# Patient Record
Sex: FEMALE | Race: WHITE | NOT HISPANIC OR LATINO | Employment: OTHER | URBAN - METROPOLITAN AREA
[De-identification: names, ages, dates, MRNs, and addresses within clinical notes are randomized per-mention and may not be internally consistent; named-entity substitution may affect disease eponyms.]

---

## 2017-03-01 ENCOUNTER — ALLSCRIPTS OFFICE VISIT (OUTPATIENT)
Dept: OTHER | Facility: OTHER | Age: 62
End: 2017-03-01

## 2017-03-01 DIAGNOSIS — F32.9 MAJOR DEPRESSIVE DISORDER, SINGLE EPISODE: ICD-10-CM

## 2017-03-01 DIAGNOSIS — R31.9 HEMATURIA: ICD-10-CM

## 2017-03-01 DIAGNOSIS — F41.9 ANXIETY DISORDER: ICD-10-CM

## 2017-03-01 DIAGNOSIS — Z12.11 ENCOUNTER FOR SCREENING FOR MALIGNANT NEOPLASM OF COLON: ICD-10-CM

## 2017-03-01 DIAGNOSIS — Z13.820 ENCOUNTER FOR SCREENING FOR OSTEOPOROSIS: ICD-10-CM

## 2017-03-01 DIAGNOSIS — E78.00 PURE HYPERCHOLESTEROLEMIA: ICD-10-CM

## 2017-03-01 DIAGNOSIS — Z01.419 ENCOUNTER FOR GYNECOLOGICAL EXAMINATION WITHOUT ABNORMAL FINDING: ICD-10-CM

## 2017-03-01 DIAGNOSIS — J20.9 ACUTE BRONCHITIS: ICD-10-CM

## 2017-03-01 DIAGNOSIS — J30.9 ALLERGIC RHINITIS: ICD-10-CM

## 2017-03-01 DIAGNOSIS — Z00.00 ENCOUNTER FOR GENERAL ADULT MEDICAL EXAMINATION WITHOUT ABNORMAL FINDINGS: ICD-10-CM

## 2017-03-01 DIAGNOSIS — Z12.31 ENCOUNTER FOR SCREENING MAMMOGRAM FOR MALIGNANT NEOPLASM OF BREAST: ICD-10-CM

## 2017-09-21 ENCOUNTER — ALLSCRIPTS OFFICE VISIT (OUTPATIENT)
Dept: OTHER | Facility: OTHER | Age: 62
End: 2017-09-21

## 2017-11-25 ENCOUNTER — ALLSCRIPTS OFFICE VISIT (OUTPATIENT)
Dept: OTHER | Facility: OTHER | Age: 62
End: 2017-11-25

## 2017-11-26 NOTE — PROGRESS NOTES
Assessment    1  Serous otitis media (381 4) (H65 90)   2  Eczema (692 9) (L30 9)   3  Sinusitis (473 9) (J32 9)    Discussion/Summary    #1  Sinusitis-patient was placed on amoxicillin 500 mg 1 3 times a day number #30  She is to use Coricidin HBP/cold and flu  Otitis media, bilaterally  Eczema of the right ear canal-patient to use hydrocortisone cream 2 5% apply sparingly to area of the outer ear canal twice a day  to settle down her eczema and to get rid of her infection  if not better  Possible side effects of new medications were reviewed with the patient/guardian today  The treatment plan was reviewed with the patient/guardian  The patient/guardian understands and agrees with the treatment plan     Self Referrals: No      Chief Complaint  Dr Brinda Hannah saw her in Sept  for bronchitis  told to use flonase spray and allegra  stopped flonase because it dried out her nasal passages  today ears have a muffled sound and trouble hearing no fever      History of Present Illness  HPI: This is a 41-year-old female who comes in with a sensation of her ears feeling full  She also has some drainage coming from both ears the right worse than the left  She denies any postnasal drip, cough, nausea, vomiting or diarrhea  She has been using Flonase and Allegra however the Flonase has made her nose so dry that she stopped using that  Her blood pressure today is 118/70 and her temperature is 98 7Â°  Review of Systems   Constitutional: feeling poorly, but-- as noted in HPI,-- no fever,-- no chills-- and-- not feeling tired  ENT: hearing loss-- and-- Ears feel blocked, but-- as noted in HPI,-- no nosebleeds,-- no sore throat,-- no nasal discharge-- and-- no hoarseness  Cardiovascular: no complaints of slow or fast heart rate, no chest pain, no palpitations, no leg claudication or lower extremity edema  Respiratory: no complaints of shortness of breath, no wheezing, no dyspnea on exertion, no orthopnea or PND  Gastrointestinal: no complaints of abdominal pain, no constipation, no nausea or diarrhea, no vomiting, no bloody stools  ROS reviewed  Active Problems    1  Acute bronchitis (466 0) (J20 9)   2  Allergic rhinitis (477 9) (J30 9)   3  Depression, recurrent (296 30) (F33 9)   4  Encounter for screening mammogram for malignant neoplasm of breast (V76 12) (Z12 31)   5  Hematuria (599 70) (R31 9)   6  Hypercholesterolemia (272 0) (E78 00)   7  Low back pain (724 2) (M54 5)   8  Screening for colon cancer (V76 51) (Z12 11)   9  Screening for osteoporosis (V82 81) (Z13 820)   10  Tobacco abuse (305 1) (Z72 0)   11  Visit for routine gyn exam (V72 31) (Z01 419)    Past Medical History  1  History of Acute maxillary sinusitis (461 0) (J01 00)  Active Problems And Past Medical History Reviewed: The active problems and past medical history were reviewed and updated today  Family History  Father    1  Family history of Colon Cancer (V16 0)  Brother    2  Family history of Colon Cancer (V16 0)  Family History Reviewed: The family history was reviewed and updated today  Social History     · Denied: History of Alcohol Use (History)   · Current every day smoker (305 1) (F17 200)   · Denied: History of Drug Use   · Employed   · One child   ·   The social history was reviewed and updated today  The social history was reviewed and is unchanged  Surgical History  Surgical History Reviewed: The surgical history was reviewed and updated today  Current Meds   1  Advil CAPS Recorded   2  Aleve 220 MG Oral Tablet; Take one tablet twice daily with food; Therapy: 97GLC4523 to (Last Rx:50Iny9692) Ordered   3  Azelastine HCl - 0 1 % Nasal Solution; use 2 sprays each nostril twice a day; Therapy: 03ADC4922 to (Last Huber Grills)  Requested for: 21Sep2017 Ordered   4  Tylenol 325 MG Oral Tablet; TAKE 1 TO 2 TABLETS EVERY 4 HOURS AS NEEDED Recorded   5   Ventolin  (90 Base) MCG/ACT Inhalation Aerosol Solution; INHALE 1 TO 2 PUFFS EVERY 6 HOURS AS NEEDED; Therapy: 94CQW1201 to (Last Rx:01Mar2017)  Requested for: 52QNJ2011 Ordered    The medication list was reviewed and updated today  Allergies  1  No Known Drug Allergies    Vitals   Recorded: 65YXX7248 11:27AM   Temperature 98 7 F   Heart Rate 80   Respiration 16   Systolic 500   Diastolic 70   Height 5 ft 6 in   Weight 157 lb    BMI Calculated 25 34   BSA Calculated 1 81       Physical Exam   Constitutional  General appearance: No acute distress, well appearing and well nourished  Ears, Nose, Mouth, and Throat  External inspection of ears and nose: Abnormal  -- Excoriation from her eczema in the outer aspect of her ear  Otoscopic examination: Abnormal  -- Tympanic membranes dull bilaterally  Oropharynx: Normal with no erythema, edema, exudate or lesions  Pulmonary  Auscultation of lungs: Clear to auscultation  Cardiovascular  Auscultation of heart: Normal rate and rhythm, normal S1 and S2, without murmurs  Examination of extremities for edema and/or varicosities: Normal    Lymphatic  Palpation of lymph nodes in neck: Abnormal  -- Positive anterior cervical adenopathy nontender to palpation          Signatures   Electronically signed by : Neal Jo, [facility]; Nov 25 2017 11:47AM EST                       (Author)    Electronically signed by : Norberto Pepper DO; Nov 25 2017  4:09PM EST

## 2017-12-16 ENCOUNTER — ALLSCRIPTS OFFICE VISIT (OUTPATIENT)
Dept: OTHER | Facility: OTHER | Age: 62
End: 2017-12-16

## 2017-12-19 NOTE — PROGRESS NOTES
Assessment  1  Current every day smoker (305 1) (F17 200)   2  Otitis externa, acute (380 10) (H60 509)   3  Serous otitis media (381 4) (H65 90)    Plan  Serous otitis media    · Ciprofloxacin HCl - 500 MG Oral Tablet; Take 1 tablet twice daily   · Ofloxacin 0 3 % Ophthalmic Solution (Ocuflox); And still 5 drops in the affected eartwice daily   · PredniSONE 10 MG Oral Tablet; 6,6,5,5,4,4,3,3,2,2,1,1    Discussion/Summary    1  Severe otitis media with effusion and otitis externa-recommend treatment with Cipro 500 mg twice daily for 10 days  Patient also given Ocuflox this in drops to be used 5 drops in each ear twice daily  Because of severe eczema and eustachian tube dysfunction she was also given prednisone taper, Prednisone 10 mg, Take 6 tablets daily for 2 days, then 5 tablets daily for 2 days, then 4 tablets daily for 2 days, then 3 tablets daily for 2 days, then 2 tablets daily for 2 days, then 1 tablet daily for 2 days  Forty-two pills with no refills  Patient has follow-up in the next 7 days if this is not improving and further evaluation by Otolaryngology may be necessary  Chief Complaint  Fluid in ears x months  Itchy  Has been treated in the past,, but symptoms keep recurring  Notes decreased hearing  - [facility]      History of Present Illness  HPI: This is a 78-year-old female who presents to the office very frustrated  She has been dealing with ear discomfort, muffling sensation, and loss of hearing since September  She has been through courses of antibiotics without much benefit  She continues to have severe symptoms and states that she does not have insurance to get a lot of testing or further evaluation completed  Review of Systems   Constitutional: feeling poorly-- and-- feeling tired, but-- no fever-- and-- no chills  ENT: earache,-- hearing loss-- and-- nasal discharge, but-- as noted in HPI  Respiratory: no cough-- and-- no shortness of breath during exertion    Gastrointestinal: no abdominal pain  Neurological: no headache  Active Problems  1  Acute bronchitis (466 0) (J20 9)   2  Allergic rhinitis (477 9) (J30 9)   3  Depression, recurrent (296 30) (F33 9)   4  Eczema (692 9) (L30 9)   5  Encounter for screening mammogram for malignant neoplasm of breast (V76 12) (Z12 31)   6  Hematuria (599 70) (R31 9)   7  Hypercholesterolemia (272 0) (E78 00)   8  Low back pain (724 2) (M54 5)   9  Screening for colon cancer (V76 51) (Z12 11)   10  Screening for osteoporosis (V82 81) (Z13 820)   11  Serous otitis media (381 4) (H65 90)   12  Tobacco abuse (305 1) (Z72 0)   13  Visit for routine gyn exam (V72 31) (Z01 419)    Past Medical History  1  History of Acute maxillary sinusitis (461 0) (J01 00)   2  Sinusitis (473 9) (J32 9)  Active Problems And Past Medical History Reviewed: The active problems and past medical history were reviewed and updated today  Family History  Father    1  Family history of Colon Cancer (V16 0)  Brother    2  Family history of Colon Cancer (V16 0)    Social History   · Denied: History of Alcohol Use (History)   · Current every day smoker (305 1) (F17 200)   · Denied: History of Drug Use   · Employed   · One child   ·     Current Meds   1  Advil CAPS; Therapy: (Recorded:22Kip4544) to Recorded   2  Aleve 220 MG Oral Tablet; Take one tablet twice daily with food; Therapy: 26AJR0360 to (Last Rx:91Mhz3740) Ordered   3  Azelastine HCl - 0 1 % Nasal Solution; use 2 sprays each nostril twice a day; Therapy: 38XJR2222 to (Last Nancy Mary)  Requested for: 83Rgr1625 Ordered   4  Hydrocortisone 2 5 % External Cream; APPLY SPARINGLY TO AFFECTED AREA(S) TWICE DAILY; Therapy: 03WUD3570 to (Last Rx:39Vph3251)  Requested for: 47RXZ3230 Ordered   5  Tylenol 325 MG Oral Tablet; TAKE 1 TO 2 TABLETS EVERY 4 HOURS AS NEEDED; Therapy: (Recorded:53Ipe4706) to Recorded    The medication list was reviewed and updated today  Allergies  1   No Known Drug Allergies    Vitals Recorded: 57QSS1641 10:02AM   Temperature 99 F, Oral   Heart Rate 84, L Radial   Pulse Quality Regular, L Radial   Systolic 237, LUE, Sitting   Diastolic 70, LUE, Sitting   Height 5 ft 6 in   Weight 154 lb    BMI Calculated 24 86   BSA Calculated 1 79     Physical Exam   Constitutional  General appearance: No acute distress, well appearing and well nourished  Eyes  Conjunctiva and lids: No swelling, erythema or discharge  Pupils and irises: Equal, round and reactive to light  Ears, Nose, Mouth, and Throat  External inspection of ears and nose: Normal    Otoscopic examination: Abnormal  -- Bilateral tympanic membranes are dull, erythematous, with yellow green fluid effusion  Ear canals bilaterally are erythematous, edematous, with dry cracking skin  Nasal mucosa, septum, and turbinates: Normal without edema or erythema  Oropharynx: Normal with no erythema, edema, exudate or lesions  Pulmonary  Respiratory effort: No increased work of breathing or signs of respiratory distress  Auscultation of lungs: Clear to auscultation  Cardiovascular  Auscultation of heart: Normal rate and rhythm, normal S1 and S2, without murmurs  Examination of extremities for edema and/or varicosities: Normal    Abdomen  Abdomen: Non-tender, no masses  Liver and spleen: No hepatomegaly or splenomegaly  Lymphatic  Palpation of lymph nodes in neck: No lymphadenopathy  Musculoskeletal  Gait and station: Normal    Digits and nails: Normal without clubbing or cyanosis  Neurologic  Reflexes: 2+ and symmetric  Sensation: No sensory loss  Psychiatric  Orientation to person, place, and time: Normal    Mood and affect: Normal          Signatures   Electronically signed by : Brandon Dawson [facility]; Dec 16 2017 10:28AM EST                       (Author)    Electronically signed by :  UZAIR Lorenzana ; Dec 18 2017  5:24PM EST

## 2018-01-14 VITALS
TEMPERATURE: 99.4 F | WEIGHT: 158 LBS | SYSTOLIC BLOOD PRESSURE: 120 MMHG | DIASTOLIC BLOOD PRESSURE: 64 MMHG | BODY MASS INDEX: 25.39 KG/M2 | HEART RATE: 80 BPM | HEIGHT: 66 IN

## 2018-01-14 VITALS
HEIGHT: 66 IN | WEIGHT: 157 LBS | DIASTOLIC BLOOD PRESSURE: 70 MMHG | SYSTOLIC BLOOD PRESSURE: 118 MMHG | RESPIRATION RATE: 16 BRPM | HEART RATE: 80 BPM | TEMPERATURE: 98.7 F | BODY MASS INDEX: 25.23 KG/M2

## 2018-01-14 VITALS
HEART RATE: 72 BPM | SYSTOLIC BLOOD PRESSURE: 110 MMHG | TEMPERATURE: 99.1 F | DIASTOLIC BLOOD PRESSURE: 62 MMHG | WEIGHT: 160 LBS | BODY MASS INDEX: 25.82 KG/M2

## 2018-01-14 NOTE — MISCELLANEOUS
Message  Return to work or school:   Kelby Meckel is under my professional care  She was seen in my office on 9/21/17       Please excuse from work on 9/21/17 and 9/22/17  Salo 100, DO  Signatures   Electronically signed by :  Deepa Ledesma, ; Sep 21 2017 12:27PM EST                       (Author)

## 2018-01-22 VITALS
DIASTOLIC BLOOD PRESSURE: 70 MMHG | TEMPERATURE: 99 F | WEIGHT: 154 LBS | BODY MASS INDEX: 24.75 KG/M2 | HEIGHT: 66 IN | HEART RATE: 84 BPM | SYSTOLIC BLOOD PRESSURE: 112 MMHG

## 2018-01-27 ENCOUNTER — OFFICE VISIT (OUTPATIENT)
Dept: FAMILY MEDICINE CLINIC | Facility: CLINIC | Age: 63
End: 2018-01-27

## 2018-01-27 VITALS
TEMPERATURE: 98.6 F | DIASTOLIC BLOOD PRESSURE: 72 MMHG | HEIGHT: 67 IN | BODY MASS INDEX: 23.67 KG/M2 | SYSTOLIC BLOOD PRESSURE: 102 MMHG | WEIGHT: 150.8 LBS

## 2018-01-27 DIAGNOSIS — J30.9 CHRONIC ALLERGIC RHINITIS, UNSPECIFIED SEASONALITY, UNSPECIFIED TRIGGER: ICD-10-CM

## 2018-01-27 DIAGNOSIS — Z72.0 TOBACCO ABUSE: ICD-10-CM

## 2018-01-27 DIAGNOSIS — H60.543 ECZEMA OF EXTERNAL EAR, BILATERAL: ICD-10-CM

## 2018-01-27 DIAGNOSIS — H60.63 CHRONIC OTITIS EXTERNA OF BOTH EARS, UNSPECIFIED TYPE: Primary | ICD-10-CM

## 2018-01-27 DIAGNOSIS — H66.005 RECURRENT ACUTE SUPPURATIVE OTITIS MEDIA WITHOUT SPONTANEOUS RUPTURE OF LEFT TYMPANIC MEMBRANE: ICD-10-CM

## 2018-01-27 PROBLEM — F33.9 DEPRESSION, RECURRENT (HCC): Status: ACTIVE | Noted: 2017-09-21

## 2018-01-27 PROCEDURE — 99213 OFFICE O/P EST LOW 20 MIN: CPT | Performed by: FAMILY MEDICINE

## 2018-01-27 RX ORDER — AMOXICILLIN AND CLAVULANATE POTASSIUM 875; 125 MG/1; MG/1
1 TABLET, FILM COATED ORAL EVERY 12 HOURS SCHEDULED
Qty: 20 TABLET | Refills: 0 | Status: SHIPPED | OUTPATIENT
Start: 2018-01-27 | End: 2018-02-06

## 2018-01-27 RX ORDER — MOMETASONE FUROATE 50 UG/1
2 SPRAY, METERED NASAL DAILY
Qty: 17 G | Refills: 0 | Status: SHIPPED | OUTPATIENT
Start: 2018-01-27 | End: 2018-11-02 | Stop reason: ALTCHOICE

## 2018-01-27 RX ORDER — ALBUTEROL SULFATE 90 UG/1
AEROSOL, METERED RESPIRATORY (INHALATION)
COMMUNITY
Start: 2017-03-01 | End: 2018-11-02 | Stop reason: ALTCHOICE

## 2018-01-27 NOTE — PROGRESS NOTES
Assessment/Plan:    Chronic otitis externa of both ears  This is chronic, patient to use Cortisporin ear drops, patient to follow up with ENT if not better       Diagnoses and all orders for this visit:    Chronic otitis externa of both ears, unspecified type  -     neomycin-polymyxin-hydrocortisone (CORTISPORIN) otic solution; Administer 4 drops into both ears every 6 (six) hours  -     Ambulatory Referral to Otolaryngology    Eczema of external ear, bilateral  -     neomycin-polymyxin-hydrocortisone (CORTISPORIN) otic solution; Administer 4 drops into both ears every 6 (six) hours  -     Ambulatory Referral to Otolaryngology    Recurrent acute suppurative otitis media without spontaneous rupture of left tympanic membrane  Comments:  Patient need ENT evaluation, to use Augmentin for now to treat the acute infection  Orders:  -     amoxicillin-clavulanate (AUGMENTIN) 875-125 mg per tablet; Take 1 tablet by mouth every 12 (twelve) hours for 10 days  -     Ambulatory Referral to Otolaryngology    Chronic allergic rhinitis, unspecified seasonality, unspecified trigger  Comments:  Patient to try Nasonex as per her request   Orders:  -     mometasone (NASONEX) 50 mcg/act nasal spray; 2 sprays into each nostril daily    Tobacco abuse  Comments:  Not ready to quit, encouraged to think about it  Other orders  -     albuterol (VENTOLIN HFA) 90 mcg/act inhaler; Inhale          Subjective:      Patient ID: Vianey Griffin is a 65 y o  female  Pt c/o drainage in ears, pt having trouble hearing  BL ear pain and drainage, hearing is muffled , had a dry skin there, pt didn't tried any meds   Pt was seen 5 weeks ago Cipro and systemic steroid was given and ear drops where given but the pt could nto afford the ear drops and her sx improved and today the drainage started again few days ago , no fever ro chills     For her allergy using Flonase   Ear Drainage    There is pain in both ears   This is a recurrent problem  The current episode started yesterday  The problem occurs constantly  The problem has been gradually worsening  There has been no fever  The patient is experiencing no pain  Associated symptoms include ear discharge and hearing loss  Pertinent negatives include no abdominal pain, diarrhea, headaches, neck pain, rash, rhinorrhea, sore throat or vomiting  She has tried nothing for the symptoms  The treatment provided no relief  Her past medical history is significant for a chronic ear infection and hearing loss  The following portions of the patient's history were reviewed and updated as appropriate: allergies, current medications, past family history, past medical history, past social history, past surgical history and problem list     Review of Systems   Constitutional: Negative for activity change, appetite change, chills, fatigue and fever  HENT: Positive for ear discharge, ear pain and hearing loss  Negative for congestion, drooling, rhinorrhea, sinus pain, sneezing, sore throat, trouble swallowing and voice change  Respiratory: Negative for chest tightness, shortness of breath and wheezing  Cardiovascular: Negative for chest pain and leg swelling  Gastrointestinal: Negative for abdominal pain, diarrhea, nausea and vomiting  Genitourinary: Negative for dysuria  Musculoskeletal: Negative for neck pain  Skin: Negative for rash  Neurological: Negative for headaches  Objective:  Vitals:    01/27/18 0939   BP: 102/72   Temp: 98 6 °F (37 °C)   Weight: 68 4 kg (150 lb 12 8 oz)   Height: 5' 7" (1 702 m)        Physical Exam   Constitutional: She appears well-developed and well-nourished  HENT:   Right Ear: Ear canal normal  There is drainage, swelling and tenderness  No foreign bodies  No mastoid tenderness  Tympanic membrane is erythematous  Tympanic membrane is not injected, not scarred and not perforated  No middle ear effusion  No hemotympanum  Decreased hearing is noted  Left Ear: There is drainage, swelling and tenderness  No foreign bodies  No mastoid tenderness  Tympanic membrane is erythematous  Tympanic membrane is not injected, not scarred and not perforated  No middle ear effusion  No hemotympanum  Decreased hearing (There is erythema and swelling on the ear canal) is noted  Ears:    Eyes: Conjunctivae and EOM are normal    Neck: Normal range of motion  Neck supple  Cardiovascular: Normal rate, regular rhythm and normal heart sounds  Pulmonary/Chest: Effort normal and breath sounds normal    Musculoskeletal: Normal range of motion

## 2018-01-27 NOTE — PATIENT INSTRUCTIONS
Diagnoses and all orders for this visit:    Chronic otitis externa of both ears, unspecified type  -     neomycin-polymyxin-hydrocortisone (CORTISPORIN) otic solution; Administer 4 drops into both ears every 6 (six) hours  -     Ambulatory Referral to Otolaryngology    Eczema of external ear, bilateral  -     neomycin-polymyxin-hydrocortisone (CORTISPORIN) otic solution; Administer 4 drops into both ears every 6 (six) hours  -     Ambulatory Referral to Otolaryngology    Recurrent acute suppurative otitis media without spontaneous rupture of left tympanic membrane  Comments:  Patient need ENT evaluation, to use Augmentin for now to treat the acute infection  Orders:  -     amoxicillin-clavulanate (AUGMENTIN) 875-125 mg per tablet; Take 1 tablet by mouth every 12 (twelve) hours for 10 days  -     Ambulatory Referral to Otolaryngology    Chronic allergic rhinitis, unspecified seasonality, unspecified trigger  Comments:  Patient to try Nasonex as per her request   Orders:  -     mometasone (NASONEX) 50 mcg/act nasal spray; 2 sprays into each nostril daily    Tobacco abuse  Comments:  Not ready to quit, encouraged to think about it  Other orders  -     albuterol (VENTOLIN HFA) 90 mcg/act inhaler;  Inhale

## 2018-11-02 ENCOUNTER — OFFICE VISIT (OUTPATIENT)
Dept: FAMILY MEDICINE CLINIC | Facility: CLINIC | Age: 63
End: 2018-11-02

## 2018-11-02 VITALS
HEART RATE: 84 BPM | WEIGHT: 151 LBS | BODY MASS INDEX: 23.7 KG/M2 | TEMPERATURE: 99.4 F | HEIGHT: 67 IN | DIASTOLIC BLOOD PRESSURE: 72 MMHG | SYSTOLIC BLOOD PRESSURE: 110 MMHG

## 2018-11-02 DIAGNOSIS — J01.90 ACUTE NON-RECURRENT SINUSITIS, UNSPECIFIED LOCATION: Primary | ICD-10-CM

## 2018-11-02 PROBLEM — J32.9 SINUSITIS: Status: ACTIVE | Noted: 2017-11-25

## 2018-11-02 PROCEDURE — 99212 OFFICE O/P EST SF 10 MIN: CPT | Performed by: FAMILY MEDICINE

## 2018-11-02 RX ORDER — NAPROXEN SODIUM 220 MG
220 TABLET ORAL 2 TIMES DAILY WITH MEALS
COMMUNITY

## 2018-11-02 RX ORDER — AMOXICILLIN 500 MG/1
CAPSULE ORAL
Qty: 30 CAPSULE | Refills: 0 | Status: SHIPPED | OUTPATIENT
Start: 2018-11-02 | End: 2018-11-09

## 2018-11-02 RX ORDER — FEXOFENADINE HCL 180 MG/1
180 TABLET ORAL DAILY
COMMUNITY

## 2018-11-02 NOTE — PROGRESS NOTES
Assessment/Plan:    Sinusitis  Patient was placed on amoxicillin 500 mg 1 capsule 3 times a day for 7 days  She was also instructed to use Coricidin HBP/cold and flu 1 tablet twice a day and 2 at bedtime  She may continue to use her Aleve for her achiness  Diagnoses and all orders for this visit:    Acute non-recurrent sinusitis, unspecified location  -     amoxicillin (AMOXIL) 500 mg capsule; One tablet 3 times a day for 7 days  Other orders  -     fexofenadine (ALLEGRA) 180 MG tablet; Take 180 mg by mouth daily  -     naproxen sodium (ALEVE) 220 MG tablet; Take 220 mg by mouth 2 (two) times a day with meals          Subjective:   Congestion, sinus pressure that started over the weekend  Occasional nonproductive cough  -  [facility]     Patient ID: Kim Mcclain is a 59 y o  female  This is a 71-year-old female who comes in with symptoms of fatigue, chills and achiness, a slight cough which is productive and head congestion  She has an excessive amount of postnasal drip but no nausea, vomiting or diarrhea  She had some nausea yesterday but it has resolved today  She has used Aleve for her temperature which is 99 4 today  Symptoms began yesterday  The following portions of the patient's history were reviewed and updated as appropriate: allergies, current medications, past family history, past medical history, past social history, past surgical history and problem list     Review of Systems   Constitutional: Negative  Negative for fever  HENT: Positive for congestion, postnasal drip, rhinorrhea and sinus pressure  Negative for ear pain and sore throat  Eyes: Negative  Respiratory: Positive for cough  Negative for shortness of breath and wheezing  Cardiovascular: Negative  Negative for chest pain  Gastrointestinal: Negative  Negative for diarrhea and nausea  Endocrine: Negative  Genitourinary: Negative  Musculoskeletal: Negative  Skin: Negative  Allergic/Immunologic: Negative  Neurological: Negative  Hematological: Negative  Psychiatric/Behavioral: Negative  Objective:      /72 (BP Location: Left arm, Patient Position: Sitting, Cuff Size: Large)   Pulse 84   Temp 99 4 °F (37 4 °C) (Oral)   Ht 5' 7" (1 702 m)   Wt 68 5 kg (151 lb)   BMI 23 65 kg/m²          Physical Exam   Constitutional: She is oriented to person, place, and time  She appears well-developed and well-nourished  HENT:   Head: Normocephalic  Tympanic membranes dull bilaterally without injection or erythema  Positive postnasal drip, no erythema of posterior pharynx and no exudates   Eyes: Pupils are equal, round, and reactive to light  Conjunctivae and EOM are normal    Neck: Normal range of motion  Neck supple  Cardiovascular: Normal rate, regular rhythm and normal heart sounds  Pulmonary/Chest: Effort normal  She has wheezes  Abdominal: Soft  Bowel sounds are normal    Musculoskeletal: Normal range of motion  Lymphadenopathy:     She has no cervical adenopathy  Neurological: She is alert and oriented to person, place, and time  Skin: Skin is warm  Psychiatric: She has a normal mood and affect   Her behavior is normal  Judgment and thought content normal

## 2018-11-02 NOTE — ASSESSMENT & PLAN NOTE
Patient was placed on amoxicillin 500 mg 1 capsule 3 times a day for 7 days  She was also instructed to use Coricidin HBP/cold and flu 1 tablet twice a day and 2 at bedtime  She may continue to use her Aleve for her achiness

## 2019-04-25 ENCOUNTER — OFFICE VISIT (OUTPATIENT)
Dept: FAMILY MEDICINE CLINIC | Facility: CLINIC | Age: 64
End: 2019-04-25

## 2019-04-25 VITALS
WEIGHT: 147.2 LBS | HEART RATE: 88 BPM | BODY MASS INDEX: 23.1 KG/M2 | HEIGHT: 67 IN | DIASTOLIC BLOOD PRESSURE: 64 MMHG | SYSTOLIC BLOOD PRESSURE: 118 MMHG

## 2019-04-25 DIAGNOSIS — R25.0 HEAD MOVEMENTS ABNORMAL: ICD-10-CM

## 2019-04-25 DIAGNOSIS — I49.9 IRREGULAR HEART RATE: Primary | ICD-10-CM

## 2019-04-25 DIAGNOSIS — R25.1 TREMOR OF BOTH HANDS: ICD-10-CM

## 2019-04-25 DIAGNOSIS — G25.0 BENIGN HEAD TREMOR: ICD-10-CM

## 2019-04-25 DIAGNOSIS — I49.1 PREMATURE SUPRAVENTRICULAR BEATS: ICD-10-CM

## 2019-04-25 DIAGNOSIS — R94.31 LONG QT INTERVAL: ICD-10-CM

## 2019-04-25 DIAGNOSIS — Z13.220 LIPID SCREENING: ICD-10-CM

## 2019-04-25 PROCEDURE — 99213 OFFICE O/P EST LOW 20 MIN: CPT | Performed by: PHYSICIAN ASSISTANT

## 2019-04-25 PROCEDURE — 93000 ELECTROCARDIOGRAM COMPLETE: CPT | Performed by: PHYSICIAN ASSISTANT

## 2019-04-26 LAB
ALBUMIN SERPL-MCNC: 4.5 G/DL (ref 3.6–5.1)
ALBUMIN/GLOB SERPL: 1.7 (CALC) (ref 1–2.5)
ALP SERPL-CCNC: 64 U/L (ref 33–130)
ALT SERPL-CCNC: 15 U/L (ref 6–29)
AST SERPL-CCNC: 21 U/L (ref 10–35)
BILIRUB SERPL-MCNC: 0.6 MG/DL (ref 0.2–1.2)
BUN SERPL-MCNC: 12 MG/DL (ref 7–25)
BUN/CREAT SERPL: NORMAL (CALC) (ref 6–22)
CALCIUM SERPL-MCNC: 9.7 MG/DL (ref 8.6–10.4)
CHLORIDE SERPL-SCNC: 102 MMOL/L (ref 98–110)
CHOLEST SERPL-MCNC: 268 MG/DL
CHOLEST/HDLC SERPL: 4.1 (CALC)
CO2 SERPL-SCNC: 29 MMOL/L (ref 20–32)
CREAT SERPL-MCNC: 0.72 MG/DL (ref 0.5–0.99)
GLOBULIN SER CALC-MCNC: 2.6 G/DL (CALC) (ref 1.9–3.7)
GLUCOSE SERPL-MCNC: 90 MG/DL (ref 65–99)
HDLC SERPL-MCNC: 65 MG/DL
LDLC SERPL CALC-MCNC: 184 MG/DL (CALC)
NONHDLC SERPL-MCNC: 203 MG/DL (CALC)
POTASSIUM SERPL-SCNC: 4.4 MMOL/L (ref 3.5–5.3)
PROT SERPL-MCNC: 7.1 G/DL (ref 6.1–8.1)
SL AMB EGFR AFRICAN AMERICAN: 103 ML/MIN/1.73M2
SL AMB EGFR NON AFRICAN AMERICAN: 89 ML/MIN/1.73M2
SODIUM SERPL-SCNC: 135 MMOL/L (ref 135–146)
TRIGL SERPL-MCNC: 85 MG/DL

## 2019-04-29 ENCOUNTER — TELEPHONE (OUTPATIENT)
Dept: FAMILY MEDICINE CLINIC | Facility: CLINIC | Age: 64
End: 2019-04-29

## 2019-04-29 ENCOUNTER — CONSULT (OUTPATIENT)
Dept: NEUROLOGY | Facility: CLINIC | Age: 64
End: 2019-04-29

## 2019-04-29 VITALS
WEIGHT: 146 LBS | HEIGHT: 67 IN | HEART RATE: 88 BPM | DIASTOLIC BLOOD PRESSURE: 77 MMHG | SYSTOLIC BLOOD PRESSURE: 119 MMHG | BODY MASS INDEX: 22.91 KG/M2

## 2019-04-29 DIAGNOSIS — G25.2 DYSTONIC TREMOR: ICD-10-CM

## 2019-04-29 DIAGNOSIS — I49.1 PREMATURE SUPRAVENTRICULAR BEATS: Primary | ICD-10-CM

## 2019-04-29 DIAGNOSIS — G24.3 CERVICAL DYSTONIA: Primary | ICD-10-CM

## 2019-04-29 DIAGNOSIS — R94.31 LONG QT INTERVAL: ICD-10-CM

## 2019-04-29 PROCEDURE — 99244 OFF/OP CNSLTJ NEW/EST MOD 40: CPT | Performed by: PSYCHIATRY & NEUROLOGY

## 2019-04-29 RX ORDER — ASPIRIN 81 MG/1
81 TABLET, CHEWABLE ORAL DAILY
COMMUNITY
End: 2019-11-11 | Stop reason: ALTCHOICE

## 2019-04-30 ENCOUNTER — CONSULT (OUTPATIENT)
Dept: CARDIOLOGY CLINIC | Facility: CLINIC | Age: 64
End: 2019-04-30

## 2019-04-30 VITALS
DIASTOLIC BLOOD PRESSURE: 62 MMHG | SYSTOLIC BLOOD PRESSURE: 112 MMHG | BODY MASS INDEX: 22.51 KG/M2 | HEART RATE: 82 BPM | WEIGHT: 143.4 LBS | HEIGHT: 67 IN

## 2019-04-30 DIAGNOSIS — E78.00 PURE HYPERCHOLESTEROLEMIA: Primary | ICD-10-CM

## 2019-04-30 DIAGNOSIS — R94.31 ABNORMAL EKG: ICD-10-CM

## 2019-04-30 DIAGNOSIS — Z72.0 TOBACCO ABUSE: ICD-10-CM

## 2019-04-30 PROCEDURE — 99244 OFF/OP CNSLTJ NEW/EST MOD 40: CPT | Performed by: INTERNAL MEDICINE

## 2019-05-01 ENCOUNTER — HOSPITAL ENCOUNTER (OUTPATIENT)
Dept: NON INVASIVE DIAGNOSTICS | Facility: CLINIC | Age: 64
Discharge: HOME | End: 2019-05-01

## 2019-05-01 DIAGNOSIS — E78.00 PURE HYPERCHOLESTEROLEMIA: ICD-10-CM

## 2019-05-01 DIAGNOSIS — Z72.0 TOBACCO ABUSE: ICD-10-CM

## 2019-05-01 DIAGNOSIS — R94.31 ABNORMAL EKG: ICD-10-CM

## 2019-05-01 PROCEDURE — 93306 TTE W/DOPPLER COMPLETE: CPT

## 2019-05-01 PROCEDURE — 93306 TTE W/DOPPLER COMPLETE: CPT | Performed by: INTERNAL MEDICINE

## 2019-05-06 ENCOUNTER — TELEPHONE (OUTPATIENT)
Dept: CARDIOLOGY CLINIC | Facility: CLINIC | Age: 64
End: 2019-05-06

## 2019-05-09 ENCOUNTER — HOSPITAL ENCOUNTER (OUTPATIENT)
Dept: NON INVASIVE DIAGNOSTICS | Facility: HOSPITAL | Age: 64
Discharge: HOME | End: 2019-05-09
Attending: INTERNAL MEDICINE

## 2019-05-09 DIAGNOSIS — Z72.0 TOBACCO ABUSE: ICD-10-CM

## 2019-05-09 DIAGNOSIS — E78.00 PURE HYPERCHOLESTEROLEMIA: ICD-10-CM

## 2019-05-09 DIAGNOSIS — R94.31 ABNORMAL EKG: ICD-10-CM

## 2019-05-09 PROCEDURE — 93226 XTRNL ECG REC<48 HR SCAN A/R: CPT

## 2019-05-09 PROCEDURE — 93225 XTRNL ECG REC<48 HRS REC: CPT

## 2019-05-14 PROCEDURE — 93227 XTRNL ECG REC<48 HR R&I: CPT | Performed by: INTERNAL MEDICINE

## 2019-05-15 ENCOUNTER — TELEPHONE (OUTPATIENT)
Dept: CARDIOLOGY CLINIC | Facility: CLINIC | Age: 64
End: 2019-05-15

## 2019-05-28 ENCOUNTER — DOCUMENTATION (OUTPATIENT)
Dept: CARDIOLOGY CLINIC | Facility: CLINIC | Age: 64
End: 2019-05-28

## 2019-11-11 ENCOUNTER — OFFICE VISIT (OUTPATIENT)
Dept: FAMILY MEDICINE CLINIC | Facility: CLINIC | Age: 64
End: 2019-11-11

## 2019-11-11 VITALS
SYSTOLIC BLOOD PRESSURE: 122 MMHG | WEIGHT: 154.2 LBS | HEIGHT: 67 IN | DIASTOLIC BLOOD PRESSURE: 72 MMHG | BODY MASS INDEX: 24.2 KG/M2 | HEART RATE: 88 BPM

## 2019-11-11 DIAGNOSIS — J06.9 ACUTE UPPER RESPIRATORY INFECTION: Primary | ICD-10-CM

## 2019-11-11 DIAGNOSIS — E78.00 HYPERCHOLESTEROLEMIA: ICD-10-CM

## 2019-11-11 PROBLEM — H60.63 CHRONIC OTITIS EXTERNA OF BOTH EARS: Status: RESOLVED | Noted: 2018-01-27 | Resolved: 2019-11-11

## 2019-11-11 PROBLEM — J32.9 SINUSITIS: Status: RESOLVED | Noted: 2017-11-25 | Resolved: 2019-11-11

## 2019-11-11 PROCEDURE — 99213 OFFICE O/P EST LOW 20 MIN: CPT | Performed by: PHYSICIAN ASSISTANT

## 2019-11-11 RX ORDER — AMOXICILLIN 250 MG/1
250 CAPSULE ORAL EVERY 8 HOURS SCHEDULED
Qty: 30 CAPSULE | Refills: 0 | Status: SHIPPED | OUTPATIENT
Start: 2019-11-11 | End: 2019-11-21

## 2019-11-11 NOTE — ASSESSMENT & PLAN NOTE
In April LDL was 184  Cardio recommended meds and pt never came back in  Will reorder lipid and call with results  Pt has no insurance

## 2019-11-11 NOTE — LETTER
November 11, 2019     Patient: Ysabel Valencia   YOB: 1955   Date of Visit: 11/11/2019       To Whom it May Concern:    Donta Clemens is under my professional care  She was seen in my office on 11/11/2019  She may return to work on 11/12/19  If you have any questions or concerns, please don't hesitate to call           Sincerely,          Liban Coles PA-C        CC: No Recipients

## 2019-11-11 NOTE — PROGRESS NOTES
Assessment and Plan:    Problem List Items Addressed This Visit        Respiratory    Acute upper respiratory infection - Primary     Amoxil as directed  Increase fluids  Relevant Medications    amoxicillin (AMOXIL) 250 mg capsule       Other    Hypercholesterolemia     In April LDL was 184  Cardio recommended meds and pt never came back in  Will reorder lipid and call with results  Pt has no insurance  Relevant Orders    Lipid Panel with Direct LDL reflex                 Diagnoses and all orders for this visit:    Acute upper respiratory infection  -     amoxicillin (AMOXIL) 250 mg capsule; Take 1 capsule (250 mg total) by mouth every 8 (eight) hours for 10 days    Hypercholesterolemia  -     Lipid Panel with Direct LDL reflex              Subjective:      Patient ID: Cher Koch is a 62 y o  female  CC:    Chief Complaint   Patient presents with    Cough     Pt c/o cough, headaches, fatigue x 4 to 5 days  kw    Nasal Congestion    Fatigue       HPI:      Patient here today because for the last for 5 days she has had a lot head congestion sinus pressure postnasal drip runny nose headaches and productive cough colored mucus  She is a  and is exposed to lot of different germs infections with  through 12 grade  She could not work on Friday or today and needs a note  Still smoking  Also she wanted to talk about the fact that she had a cardiology workup for an abnormal EKG 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 and blood work showed an LDL of 184 and he recommended medication  Patient has no health or prescription insurance at this time    She has been using Coricidin HBP      The following portions of the patient's history were reviewed and updated as appropriate: allergies, current medications, past family history, past medical history, past social history, past surgical history and problem list       Review of Systems   Constitutional: Positive for fatigue  HENT: Positive for congestion, rhinorrhea and sinus pressure  Eyes: Negative  Respiratory: Positive for cough  Cardiovascular: Negative  Gastrointestinal: Negative  Endocrine: Negative  Genitourinary: Negative  Musculoskeletal: Negative  Skin: Negative  Allergic/Immunologic: Negative  Neurological: Positive for headaches  Hematological: Negative  Psychiatric/Behavioral: Negative  Data to review:       Objective:    Vitals:    11/11/19 1409   BP: 122/72   BP Location: Left arm   Patient Position: Sitting   Pulse: 88   Weight: 69 9 kg (154 lb 3 2 oz)   Height: 5' 7" (1 702 m)        Physical Exam   Constitutional: She is oriented to person, place, and time  She appears well-developed and well-nourished  No distress  HENT:   Head: Normocephalic and atraumatic  Right Ear: Hearing, tympanic membrane, external ear and ear canal normal    Left Ear: Hearing, tympanic membrane, external ear and ear canal normal    Nose: Mucosal edema and rhinorrhea present  Mouth/Throat: Uvula is midline  Posterior oropharyngeal edema and posterior oropharyngeal erythema present  Eyes: Conjunctivae are normal  Right eye exhibits no discharge  Left eye exhibits no discharge  Neck: Neck supple  Carotid bruit is not present  Cardiovascular: Normal rate, regular rhythm and normal heart sounds  Exam reveals no gallop and no friction rub  No murmur heard  Pulmonary/Chest: Effort normal and breath sounds normal  No respiratory distress  She has no wheezes  She has no rales  Lymphadenopathy:     She has cervical adenopathy  Right cervical: Superficial cervical adenopathy present  Left cervical: Superficial cervical adenopathy present  Neurological: She is alert and oriented to person, place, and time  Skin: Skin is warm and dry  She is not diaphoretic  Psychiatric: She has a normal mood and affect  Judgment normal    Nursing note and vitals reviewed  Tobacco Cessation Counseling: Tobacco cessation counseling was provided  The patient is sincerely urged to quit consumption of tobacco  She is not ready to quit tobacco  Patient refused medication

## 2019-11-11 NOTE — PATIENT INSTRUCTIONS
Problem List Items Addressed This Visit        Respiratory    Acute upper respiratory infection - Primary     Amoxil as directed  Increase fluids           Relevant Medications    amoxicillin (AMOXIL) 250 mg capsule

## 2023-03-05 ENCOUNTER — APPOINTMENT (EMERGENCY)
Dept: CT IMAGING | Facility: HOSPITAL | Age: 68
End: 2023-03-05

## 2023-03-05 ENCOUNTER — HOSPITAL ENCOUNTER (EMERGENCY)
Facility: HOSPITAL | Age: 68
Discharge: HOME | End: 2023-03-05
Attending: EMERGENCY MEDICINE

## 2023-03-05 VITALS
SYSTOLIC BLOOD PRESSURE: 133 MMHG | RESPIRATION RATE: 20 BRPM | BODY MASS INDEX: 24.41 KG/M2 | HEART RATE: 88 BPM | WEIGHT: 155.87 LBS | OXYGEN SATURATION: 96 % | TEMPERATURE: 98.7 F | DIASTOLIC BLOOD PRESSURE: 71 MMHG

## 2023-03-05 DIAGNOSIS — R90.89 ABNORMAL CT OF BRAIN: ICD-10-CM

## 2023-03-05 DIAGNOSIS — R51.9 HEADACHE: Primary | ICD-10-CM

## 2023-03-05 RX ORDER — BUTALBITAL, ACETAMINOPHEN, CAFFEINE AND CODEINE PHOSPHATE 50; 325; 40; 30 MG/1; MG/1; MG/1; MG/1
1 CAPSULE ORAL EVERY 6 HOURS PRN
Qty: 15 CAPSULE | Refills: 0 | Status: SHIPPED | OUTPATIENT
Start: 2023-03-05 | End: 2023-03-11

## 2023-03-05 RX ORDER — METOCLOPRAMIDE HYDROCHLORIDE 5 MG/ML
10 INJECTION INTRAMUSCULAR; INTRAVENOUS ONCE
Status: COMPLETED | OUTPATIENT
Start: 2023-03-05 | End: 2023-03-05

## 2023-03-05 RX ORDER — DIPHENHYDRAMINE HYDROCHLORIDE 50 MG/ML
25 INJECTION INTRAMUSCULAR; INTRAVENOUS ONCE
Status: COMPLETED | OUTPATIENT
Start: 2023-03-05 | End: 2023-03-05

## 2023-03-05 RX ADMIN — METOCLOPRAMIDE 10 MG: 5 INJECTION, SOLUTION INTRAMUSCULAR; INTRAVENOUS at 11:00

## 2023-03-05 RX ADMIN — DIPHENHYDRAMINE HYDROCHLORIDE 25 MG: 50 INJECTION, SOLUTION INTRAMUSCULAR; INTRAVENOUS at 10:55

## 2023-03-05 RX ADMIN — SODIUM CHLORIDE 1000 ML: 0.9 INJECTION, SOLUTION INTRAVENOUS at 10:54

## 2023-03-05 NOTE — Clinical Note
Celina Dale was seen and treated in our emergency department on 3/5/2023  Diagnosis:     Justine    She may return on this date: If you have any questions or concerns, please don't hesitate to call        Yola Ballesteros MD    ______________________________           _______________          _______________  Hospital Representative                              Date                                Time

## 2023-03-05 NOTE — ED PROVIDER NOTES
History  Chief Complaint   Patient presents with   • Migraine     Pt reports migraine since yesterday  Hx of migraine  16-year-old female with a history of migraine headaches presents with a right-sided migraine headache since yesterday  Constant  She states that her to his  was yesterday and they had to cremate her so she has had a lot of stress in her life lately  No fevers, no nausea/vomiting, no head trauma  No sinus congestion  Headache is similar to her previous migraines  She tried Excedrin Migraine medication without relief          Prior to Admission Medications   Prescriptions Last Dose Informant Patient Reported? Taking?   fexofenadine (ALLEGRA) 180 MG tablet   Yes No   Sig: Take 180 mg by mouth daily   naproxen sodium (ALEVE) 220 MG tablet   Yes No   Sig: Take 220 mg by mouth 2 (two) times a day with meals      Facility-Administered Medications: None       Past Medical History:   Diagnosis Date   • Migraine    • Premature supraventricular beats        History reviewed  No pertinent surgical history  Family History   Problem Relation Age of Onset   • Colon cancer Father    • Colon cancer Brother      I have reviewed and agree with the history as documented  E-Cigarette/Vaping     E-Cigarette/Vaping Substances     Social History     Tobacco Use   • Smoking status: Every Day     Packs/day: 1 00     Years: 40 00     Pack years: 40 00     Types: Cigarettes   • Smokeless tobacco: Never   Substance Use Topics   • Alcohol use: No     Comment: (HISTORY)   • Drug use: No       Review of Systems   Constitutional: Negative for appetite change, fatigue and fever  HENT: Negative for rhinorrhea and sore throat  Eyes: Negative for pain  Respiratory: Negative for cough, shortness of breath and wheezing  Cardiovascular: Negative for chest pain and leg swelling  Gastrointestinal: Negative for abdominal pain, diarrhea and vomiting  Genitourinary: Negative for dysuria and flank pain  Musculoskeletal: Negative for back pain and neck pain  Skin: Negative for rash  Neurological: Positive for headaches  Negative for syncope  Psychiatric/Behavioral:        Mood normal       Physical Exam  Physical Exam  Vitals and nursing note reviewed  Constitutional:       Appearance: She is well-developed  HENT:      Head: Normocephalic and atraumatic  Right Ear: External ear normal       Left Ear: External ear normal    Eyes:      General: No scleral icterus  Extraocular Movements: Extraocular movements intact  Cardiovascular:      Rate and Rhythm: Normal rate and regular rhythm  Pulmonary:      Effort: Pulmonary effort is normal  No respiratory distress  Breath sounds: Normal breath sounds  Abdominal:      Palpations: Abdomen is soft  Tenderness: There is no abdominal tenderness  Musculoskeletal:         General: No deformity or signs of injury  Normal range of motion  Cervical back: Normal range of motion and neck supple  Skin:     General: Skin is warm and dry  Coloration: Skin is not jaundiced or pale  Neurological:      General: No focal deficit present  Mental Status: She is alert and oriented to person, place, and time  Cranial Nerves: No cranial nerve deficit  Sensory: No sensory deficit  Motor: No weakness        Gait: Gait normal    Psychiatric:         Mood and Affect: Mood normal          Behavior: Behavior normal          Vital Signs  ED Triage Vitals [03/05/23 1020]   Temperature Pulse Respirations Blood Pressure SpO2   98 7 °F (37 1 °C) 93 20 161/84 99 %      Temp Source Heart Rate Source Patient Position - Orthostatic VS BP Location FiO2 (%)   Temporal Monitor -- Right arm --      Pain Score       9           Vitals:    03/05/23 1020 03/05/23 1230   BP: 161/84 133/71   Pulse: 93 88         Visual Acuity  Visual Acuity    Flowsheet Row Most Recent Value   L Pupil Size (mm) 3   R Pupil Size (mm) 3          ED Medications  Medications   sodium chloride 0 9 % bolus 1,000 mL (0 mL Intravenous Stopped 3/5/23 1216)   diphenhydrAMINE (BENADRYL) injection 25 mg (25 mg Intravenous Given 3/5/23 1055)   metoclopramide (REGLAN) injection 10 mg (10 mg Intravenous Given 3/5/23 1100)       Diagnostic Studies  Results Reviewed     None                 CT head without contrast   Final Result by Jone Del Angel MD (03/05 1211)      Ill-defined hypoattenuation in the left occipital lobe measuring 2 6 x 2 4 cm, new from August 2010, which may be related to a focal brain lesion or age-indeterminate infarct  Further evaluation with contrast-enhanced MRI is recommended  I personally discussed this study with EMERSON Blair on 3/5/2023 at 12:10 PM                      Workstation performed: [id number]                    Procedures  Procedures         ED Course                                             Medical Decision Making  I went over the CAT scan results with the patient and her family at length  Patient is refusing admission, MRI or further work-up at this time  She understands the risks of leaving AMA including death and stroke  There is no way to definitely tell what this lesion/possible infarct is on CT without the MRI or further work-up  Patient understands that and she will see her family doctor to order the MRI  She understands that if symptoms worsen or she can get in to her doctor that she will come back and be admitted for this further work-up  Abnormal CT of brain: acute illness or injury  Headache: acute illness or injury  Amount and/or Complexity of Data Reviewed  Radiology: ordered  Risk  Prescription drug management            Disposition  Final diagnoses:   Headache   Abnormal CT of brain     Time reflects when diagnosis was documented in both MDM as applicable and the Disposition within this note     Time User Action Codes Description Comment    3/5/2023 12:25 PM José Manuel Bravo Redman Add [R51 9] Headache     3/5/2023 12:26 PM Yvette Hazel Add [R90 89] Abnormal CT of brain       ED Disposition     ED Disposition   AMA    Condition   --    Date/Time   Sun Mar 5, 2023 12:25 PM    Comment   Date: 3/5/2023  Patient: Mino Shore  Admitted: 3/5/2023 10:18 AM  Attending Provider: Pancho Colvin or her authorized caregiver has made the decision for the patient to leave the emergency department agai nst the advice of her attending physician  She or her authorized caregiver has been informed and understands the inherent risks, including death, stroke, worsening of symptoms  She or her authorized caregiver has decided to accept the responsibility  for this decision  Yosef Nguyen and all necessary parties have been advised that she may return for further evaluation or treatment  Her condition at time of discharge was fair    Mino Shore had current vital signs as follows:  BP 1 61/84 (BP Location: Right arm)   Pulse 93   Temp 98 7 °F (37 1 °C) (Temporal)   Resp 20   Wt 70 7 kg (155 lb 13 8 oz)            Follow-up Information     Follow up With Specialties Details Why Contact Info Additional [address]   [address], [address] [facility] [phone]  [address], [address], [address] [place], [place], [address]   [address] [facility] [address] [id number] [phone] [place] [address], [address], [facility] [address]          Discharge Medication List as of 3/5/2023 12:30 PM      START taking these medications    Details   butalbital-acetaminophen-caffeine-codeine (FIORICET WITH CODEINE) -47-30 MG per capsule Take 1 capsule by mouth every 6 (six) hours as needed for headaches for up to 10 days, Starting Sun 3/5/2023, Until Wed 3/15/2023 at 2359, Normal         CONTINUE these medications which have NOT CHANGED    Details   fexofenadine (ALLEGRA) 180 MG tablet Take 180 mg by mouth daily, Historical Med      naproxen sodium (ALEVE) 220 MG tablet Take 220 mg by mouth 2 (two) times a day with meals, Historical Med             No discharge procedures on file      PDMP Review     None          ED Provider  Electronically Signed by           Milady Subramanian MD  03/05/23 7439

## 2023-03-05 NOTE — DISCHARGE INSTRUCTIONS
It is very important to see your family dr Anirudh Dumont neurologist to get a MRI of the brain to further evaluate the abnormality on CT    If symptoms worsen, return to ER, to be admitted and get a further workup

## 2023-03-06 ENCOUNTER — APPOINTMENT (INPATIENT)
Dept: CT IMAGING | Facility: HOSPITAL | Age: 68
End: 2023-03-06

## 2023-03-06 ENCOUNTER — APPOINTMENT (EMERGENCY)
Dept: RADIOLOGY | Facility: HOSPITAL | Age: 68
End: 2023-03-06

## 2023-03-06 ENCOUNTER — HOSPITAL ENCOUNTER (INPATIENT)
Facility: HOSPITAL | Age: 68
LOS: 5 days | Discharge: HOME | End: 2023-03-11
Attending: EMERGENCY MEDICINE | Admitting: INTERNAL MEDICINE

## 2023-03-06 DIAGNOSIS — R90.89 ABNORMAL CT OF BRAIN: ICD-10-CM

## 2023-03-06 DIAGNOSIS — R51.9 PERSISTENT HEADACHES: Primary | ICD-10-CM

## 2023-03-06 DIAGNOSIS — I48.91 NEW ONSET A-FIB (HCC): ICD-10-CM

## 2023-03-06 DIAGNOSIS — R93.429 ABNORMAL MAGNETIC RESONANCE IMAGING OF KIDNEY: ICD-10-CM

## 2023-03-06 DIAGNOSIS — I63.40 CEREBROVASCULAR ACCIDENT (CVA) DUE TO EMBOLISM OF CEREBRAL ARTERY (HCC): ICD-10-CM

## 2023-03-06 DIAGNOSIS — Z72.0 TOBACCO ABUSE: ICD-10-CM

## 2023-03-06 DIAGNOSIS — I48.91 NEW ONSET ATRIAL FIBRILLATION (HCC): ICD-10-CM

## 2023-03-06 DIAGNOSIS — I63.432 CEREBROVASCULAR ACCIDENT (CVA) DUE TO EMBOLISM OF LEFT POSTERIOR CEREBRAL ARTERY (HCC): ICD-10-CM

## 2023-03-06 DIAGNOSIS — I48.91 RAPID ATRIAL FIBRILLATION (HCC): ICD-10-CM

## 2023-03-06 PROBLEM — F41.9 ANXIETY: Status: ACTIVE | Noted: 2023-03-06

## 2023-03-06 PROBLEM — G43.109 MIGRAINE WITH AURA AND WITHOUT STATUS MIGRAINOSUS, NOT INTRACTABLE: Status: ACTIVE | Noted: 2023-03-06

## 2023-03-06 PROBLEM — R22.0 OCCIPITAL MASS: Status: ACTIVE | Noted: 2023-03-06

## 2023-03-06 LAB
2HR DELTA HS TROPONIN: -2 NG/L
4HR DELTA HS TROPONIN: -2 NG/L
ALBUMIN SERPL BCP-MCNC: 4.2 G/DL (ref 3.5–5)
ALP SERPL-CCNC: 72 U/L (ref 34–104)
ALT SERPL W P-5'-P-CCNC: 12 U/L (ref 7–52)
ANION GAP SERPL CALCULATED.3IONS-SCNC: 10 MMOL/L (ref 4–13)
APTT PPP: 25 SECONDS (ref 23–37)
AST SERPL W P-5'-P-CCNC: 21 U/L (ref 13–39)
ATRIAL RATE: 326 BPM
ATRIAL RATE: 357 BPM
ATRIAL RATE: 375 BPM
ATRIAL RATE: 394 BPM
BASOPHILS # BLD AUTO: 0.07 THOUSANDS/ÂΜL (ref 0–0.1)
BASOPHILS NFR BLD AUTO: 1 % (ref 0–1)
BILIRUB SERPL-MCNC: 0.51 MG/DL (ref 0.2–1)
BNP SERPL-MCNC: 203 PG/ML (ref 0–100)
BUN SERPL-MCNC: 12 MG/DL (ref 5–25)
CALCIUM SERPL-MCNC: 9.3 MG/DL (ref 8.4–10.2)
CARDIAC TROPONIN I PNL SERPL HS: 11 NG/L
CARDIAC TROPONIN I PNL SERPL HS: 9 NG/L
CARDIAC TROPONIN I PNL SERPL HS: 9 NG/L
CHLORIDE SERPL-SCNC: 105 MMOL/L (ref 96–108)
CHOLEST SERPL-MCNC: 227 MG/DL
CO2 SERPL-SCNC: 22 MMOL/L (ref 21–32)
CREAT SERPL-MCNC: 0.62 MG/DL (ref 0.6–1.3)
EOSINOPHIL # BLD AUTO: 0.12 THOUSAND/ÂΜL (ref 0–0.61)
EOSINOPHIL NFR BLD AUTO: 1 % (ref 0–6)
ERYTHROCYTE [DISTWIDTH] IN BLOOD BY AUTOMATED COUNT: 13.6 % (ref 11.6–15.1)
FLUAV RNA RESP QL NAA+PROBE: NEGATIVE
FLUBV RNA RESP QL NAA+PROBE: NEGATIVE
GFR SERPL CREATININE-BSD FRML MDRD: 93 ML/MIN/1.73SQ M
GLUCOSE SERPL-MCNC: 113 MG/DL (ref 65–140)
HCT VFR BLD AUTO: 40.6 % (ref 34.8–46.1)
HDLC SERPL-MCNC: 54 MG/DL
HGB BLD-MCNC: 13.5 G/DL (ref 11.5–15.4)
IMM GRANULOCYTES # BLD AUTO: 0.05 THOUSAND/UL (ref 0–0.2)
IMM GRANULOCYTES NFR BLD AUTO: 0 % (ref 0–2)
INR PPP: 0.99 (ref 0.84–1.19)
LDLC SERPL CALC-MCNC: 158 MG/DL (ref 0–100)
LYMPHOCYTES # BLD AUTO: 2.68 THOUSANDS/ÂΜL (ref 0.6–4.47)
LYMPHOCYTES NFR BLD AUTO: 24 % (ref 14–44)
MAGNESIUM SERPL-MCNC: 2 MG/DL (ref 1.9–2.7)
MCH RBC QN AUTO: 28.6 PG (ref 26.8–34.3)
MCHC RBC AUTO-ENTMCNC: 33.3 G/DL (ref 31.4–37.4)
MCV RBC AUTO: 86 FL (ref 82–98)
MONOCYTES # BLD AUTO: 0.8 THOUSAND/ÂΜL (ref 0.17–1.22)
MONOCYTES NFR BLD AUTO: 7 % (ref 4–12)
NEUTROPHILS # BLD AUTO: 7.54 THOUSANDS/ÂΜL (ref 1.85–7.62)
NEUTS SEG NFR BLD AUTO: 67 % (ref 43–75)
NRBC BLD AUTO-RTO: 0 /100 WBCS
PLATELET # BLD AUTO: 362 THOUSANDS/UL (ref 149–390)
PMV BLD AUTO: 10.1 FL (ref 8.9–12.7)
POTASSIUM SERPL-SCNC: 3.8 MMOL/L (ref 3.5–5.3)
PROT SERPL-MCNC: 7.4 G/DL (ref 6.4–8.4)
PROTHROMBIN TIME: 13.1 SECONDS (ref 11.6–14.5)
QRS AXIS: 24 DEGREES
QRS AXIS: 36 DEGREES
QRS AXIS: 36 DEGREES
QRS AXIS: 39 DEGREES
QRSD INTERVAL: 80 MS
QRSD INTERVAL: 82 MS
QRSD INTERVAL: 82 MS
QRSD INTERVAL: 88 MS
QT INTERVAL: 272 MS
QT INTERVAL: 300 MS
QT INTERVAL: 378 MS
QT INTERVAL: 378 MS
QTC INTERVAL: 393 MS
QTC INTERVAL: 452 MS
QTC INTERVAL: 454 MS
QTC INTERVAL: 479 MS
RBC # BLD AUTO: 4.72 MILLION/UL (ref 3.81–5.12)
RSV RNA RESP QL NAA+PROBE: NEGATIVE
SARS-COV-2 RNA RESP QL NAA+PROBE: NEGATIVE
SODIUM SERPL-SCNC: 137 MMOL/L (ref 135–147)
T WAVE AXIS: 19 DEGREES
T WAVE AXIS: 27 DEGREES
T WAVE AXIS: 48 DEGREES
T WAVE AXIS: 48 DEGREES
TRIGL SERPL-MCNC: 73 MG/DL
TSH SERPL DL<=0.05 MIU/L-ACNC: 2.36 UIU/ML (ref 0.45–4.5)
VENTRICULAR RATE: 126 BPM
VENTRICULAR RATE: 153 BPM
VENTRICULAR RATE: 86 BPM
VENTRICULAR RATE: 87 BPM
WBC # BLD AUTO: 11.26 THOUSAND/UL (ref 4.31–10.16)

## 2023-03-06 RX ORDER — DILTIAZEM HYDROCHLORIDE 5 MG/ML
10 INJECTION INTRAVENOUS ONCE
Status: COMPLETED | OUTPATIENT
Start: 2023-03-06 | End: 2023-03-06

## 2023-03-06 RX ORDER — KETOROLAC TROMETHAMINE 30 MG/ML
30 INJECTION, SOLUTION INTRAMUSCULAR; INTRAVENOUS ONCE
Status: DISCONTINUED | OUTPATIENT
Start: 2023-03-06 | End: 2023-03-06

## 2023-03-06 RX ORDER — KETOROLAC TROMETHAMINE 30 MG/ML
15 INJECTION, SOLUTION INTRAMUSCULAR; INTRAVENOUS ONCE
Status: COMPLETED | OUTPATIENT
Start: 2023-03-06 | End: 2023-03-06

## 2023-03-06 RX ORDER — HYDROXYZINE HYDROCHLORIDE 25 MG/1
25 TABLET, FILM COATED ORAL EVERY 6 HOURS PRN
Status: DISCONTINUED | OUTPATIENT
Start: 2023-03-06 | End: 2023-03-11 | Stop reason: HOSPADM

## 2023-03-06 RX ORDER — LANOLIN ALCOHOL/MO/W.PET/CERES
3 CREAM (GRAM) TOPICAL
Status: DISCONTINUED | OUTPATIENT
Start: 2023-03-06 | End: 2023-03-11 | Stop reason: HOSPADM

## 2023-03-06 RX ADMIN — DILTIAZEM HYDROCHLORIDE 10 MG/HR: 5 INJECTION INTRAVENOUS at 22:26

## 2023-03-06 RX ADMIN — DILTIAZEM HYDROCHLORIDE 10 MG: 5 INJECTION INTRAVENOUS at 18:40

## 2023-03-06 RX ADMIN — KETOROLAC TROMETHAMINE 15 MG: 30 INJECTION, SOLUTION INTRAMUSCULAR; INTRAVENOUS at 18:38

## 2023-03-06 RX ADMIN — IOHEXOL 85 ML: 350 INJECTION, SOLUTION INTRAVENOUS at 22:55

## 2023-03-06 RX ADMIN — DILTIAZEM HYDROCHLORIDE 5 MG/HR: 5 INJECTION INTRAVENOUS at 18:45

## 2023-03-06 RX ADMIN — SODIUM CHLORIDE 1000 ML: 0.9 INJECTION, SOLUTION INTRAVENOUS at 18:39

## 2023-03-06 NOTE — ED PROVIDER NOTES
History  Chief Complaint   Patient presents with   • Headache     Pt reports "I guess I have a really bad migraine"  Pt reports left sided head pain, slurred speech, disorientation sine 1500 today  Pt reports that she "sometimes" has these symptoms with her migraines  Pt was seen in ED yesterday and had abnormal CT of brain, was recommended admission and left AMA  79 y/o female with waxing and waning headaches since 2 days ago while at a    No head trauma, no n/v/, no fevers, no neck pain  At times pt 's speech seems affected over the past 2 days according to family, but pt  States that she gets this with her migraine headaches  She had a CT head yest  In ER which showed:  Ill-defined hypoattenuation in the left occipital lobe measuring 2 6 x 2 4 cm, new from 2010, which may be related to a focal brain lesion or age-indeterminate infarct  Further evaluation with contrast-enhanced MRI is recommended  She didn't want to be admitted at that time and signed out AMA, however today she agrees to stay and be admitted for a MRI  Pt  Is found to be in rapid afib  120's-150's upon arrival to ER, no h/o a fib  No palpitations, no lightheadedness, no cp  Prior to Admission Medications   Prescriptions Last Dose Informant Patient Reported? Taking? butalbital-acetaminophen-caffeine-codeine (FIORICET WITH CODEINE) -03-30 MG per capsule   No No   Sig: Take 1 capsule by mouth every 6 (six) hours as needed for headaches for up to 10 days   fexofenadine (ALLEGRA) 180 MG tablet   Yes No   Sig: Take 180 mg by mouth daily   naproxen sodium (ALEVE) 220 MG tablet   Yes No   Sig: Take 220 mg by mouth 2 (two) times a day with meals      Facility-Administered Medications: None       Past Medical History:   Diagnosis Date   • Migraine    • Premature supraventricular beats        No past surgical history on file      Family History   Problem Relation Age of Onset   • Colon cancer Father    • Colon cancer Brother      I have reviewed and agree with the history as documented  E-Cigarette/Vaping     E-Cigarette/Vaping Substances     Social History     Tobacco Use   • Smoking status: Every Day     Packs/day: 1 00     Years: 40 00     Pack years: 40 00     Types: Cigarettes   • Smokeless tobacco: Never   Substance Use Topics   • Alcohol use: No     Comment: (HISTORY)   • Drug use: No       Review of Systems   Constitutional: Negative for appetite change, fatigue and fever  HENT: Negative for rhinorrhea and sore throat  Eyes: Negative for pain  Respiratory: Negative for cough, shortness of breath and wheezing  Cardiovascular: Negative for chest pain and leg swelling  Gastrointestinal: Negative for abdominal pain, diarrhea and vomiting  Genitourinary: Negative for dysuria and flank pain  Musculoskeletal: Negative for back pain and neck pain  Skin: Negative for rash  Neurological: Positive for headaches  Negative for syncope  Psychiatric/Behavioral:        Mood normal       Physical Exam  Physical Exam  Vitals and nursing note reviewed  Constitutional:       Appearance: She is well-developed  HENT:      Head: Normocephalic and atraumatic  Right Ear: External ear normal       Left Ear: External ear normal       Mouth/Throat:      Mouth: Mucous membranes are moist    Eyes:      General: No scleral icterus  Extraocular Movements: Extraocular movements intact  Pupils: Pupils are equal, round, and reactive to light  Cardiovascular:      Rate and Rhythm: Tachycardia present  Rhythm irregular  Pulmonary:      Effort: Pulmonary effort is normal  No respiratory distress  Breath sounds: Normal breath sounds  Abdominal:      Palpations: Abdomen is soft  Tenderness: There is no abdominal tenderness  Musculoskeletal:         General: No deformity or signs of injury  Normal range of motion  Cervical back: Normal range of motion and neck supple     Skin: General: Skin is warm and dry  Coloration: Skin is not jaundiced or pale  Neurological:      General: No focal deficit present  Mental Status: She is alert and oriented to person, place, and time  Cranial Nerves: No cranial nerve deficit  Sensory: No sensory deficit  Motor: No weakness  Psychiatric:         Mood and Affect: Mood normal          Behavior: Behavior normal          Vital Signs  ED Triage Vitals   Temperature Pulse Respirations Blood Pressure SpO2   03/06/23 1824 03/06/23 1735 03/06/23 1735 03/06/23 1735 03/06/23 1735   97 7 °F (36 5 °C) 102 18 144/94 96 %      Temp Source Heart Rate Source Patient Position - Orthostatic VS BP Location FiO2 (%)   03/06/23 1824 03/06/23 1735 03/06/23 1830 03/06/23 1735 --   Oral Monitor Lying Right arm       Pain Score       03/06/23 1809       5           Vitals:    03/06/23 1900 03/06/23 1915 03/06/23 2034 03/06/23 2044   BP: 123/72 133/73  129/82   Pulse: 104 88 90 91   Patient Position - Orthostatic VS: Lying Lying  Sitting         Visual Acuity      ED Medications  Medications   diltiazem (CARDIZEM) 125 mg in sodium chloride 0 9 % 125 mL infusion (has no administration in time range)   sodium chloride 0 9 % bolus 1,000 mL (0 mL Intravenous Stopped 3/6/23 2018)   diltiazem (CARDIZEM) injection 10 mg (10 mg Intravenous Given 3/6/23 1840)   diltiazem (CARDIZEM) 125 mg in sodium chloride 0 9 % 125 mL infusion (10 mg/hr Intravenous Rate/Dose Change 3/6/23 1959)   ketorolac (TORADOL) injection 15 mg (15 mg Intravenous Given 3/6/23 1838)       Diagnostic Studies  Results Reviewed     Procedure Component Value Units Date/Time    HS Troponin I 2hr [[id number]] Collected: 03/06/23 2043    Lab Status:  In process Specimen: Blood from Arm, Right Updated: 03/06/23 2057    Magnesium [[id number]]  (Normal) Collected: 03/06/23 1830    Lab Status: Final result Specimen: Blood from Arm, Left Updated: 03/06/23 2051     Magnesium 2 0 mg/dL     Hemoglobin A1C [[id number]] Collected: 03/06/23 1830    Lab Status: In process Specimen: Blood from Arm, Left Updated: 03/06/23 2040    Lipid Panel with Direct LDL reflex [[id number]]     Lab Status: No result Specimen: Blood     HS Troponin I 4hr [[id number]]     Lab Status: No result Specimen: Blood     TSH [[id number]]  (Normal) Collected: 03/06/23 1830    Lab Status: Final result Specimen: Blood from Arm, Left Updated: 03/06/23 1934     TSH 3RD GENERATON 2 362 uIU/mL     Narrative:      Patients undergoing fluorescein dye angiography may retain small amounts of fluorescein in the body for 48-72 hours post procedure  Samples containing fluorescein can produce falsely depressed TSH values  If the patient had this procedure,a specimen should be resubmitted post fluorescein clearance        B-Type Natriuretic Peptide(BNP) [[id number]]  (Abnormal) Collected: 03/06/23 1830    Lab Status: Final result Specimen: Blood from Arm, Left Updated: 03/06/23 1924      pg/mL     HS Troponin 0hr (reflex protocol) [[id number]]  (Normal) Collected: 03/06/23 1830    Lab Status: Final result Specimen: Blood from Arm, Left Updated: 03/06/23 1923     hs TnI 0hr 11 ng/L     Comprehensive metabolic panel [[id number]] Collected: 03/06/23 1830    Lab Status: Final result Specimen: Blood from Arm, Left Updated: 03/06/23 1917     Sodium 137 mmol/L      Potassium 3 8 mmol/L      Chloride 105 mmol/L      CO2 22 mmol/L      ANION GAP 10 mmol/L      BUN 12 mg/dL      Creatinine 0 62 mg/dL      Glucose 113 mg/dL      Calcium 9 3 mg/dL      AST 21 U/L      ALT 12 U/L      Alkaline Phosphatase 72 U/L      Total Protein 7 4 g/dL      Albumin 4 2 g/dL      Total Bilirubin 0 51 mg/dL      eGFR 93 ml/min/1 73sq m     Narrative:      [facility] guidelines for Chronic Kidney Disease (CKD):   •  Stage 1 with normal or high GFR (GFR > 90 mL/min/1 73 square meters)  •  Stage 2 Mild CKD (GFR = 60-89 mL/min/1 73 square meters)  •  Stage 3A Moderate CKD (GFR = 45-59 mL/min/1 73 square meters)  •  Stage 3B Moderate CKD (GFR = 30-44 mL/min/1 73 square meters)  •  Stage 4 Severe CKD (GFR = 15-29 mL/min/1 73 square meters)  •  Stage 5 End Stage CKD (GFR <15 mL/min/1 73 square meters)  Note: GFR calculation is accurate only with a steady state creatinine    Protime-INR [[id number]]  (Normal) Collected: 03/06/23 1830    Lab Status: Final result Specimen: Blood from Arm, Left Updated: 03/06/23 1913     Protime 13 1 seconds      INR 0 99    APTT [[id number]]  (Normal) Collected: 03/06/23 1830    Lab Status: Final result Specimen: Blood from Arm, Left Updated: 03/06/23 1913     PTT 25 seconds     CBC and differential [[id number]]  (Abnormal) Collected: 03/06/23 1830    Lab Status: Final result Specimen: Blood from Arm, Left Updated: 03/06/23 1857     WBC 11 26 Thousand/uL      RBC 4 72 Million/uL      Hemoglobin 13 5 g/dL      Hematocrit 40 6 %      MCV 86 fL      MCH 28 6 pg      MCHC 33 3 g/dL      RDW 13 6 %      MPV 10 1 fL      Platelets 420 Thousands/uL      nRBC 0 /100 WBCs      Neutrophils Relative 67 %      Immat GRANS % 0 %      Lymphocytes Relative 24 %      Monocytes Relative 7 %      Eosinophils Relative 1 %      Basophils Relative 1 %      Neutrophils Absolute 7 54 Thousands/µL      Immature Grans Absolute 0 05 Thousand/uL      Lymphocytes Absolute 2 68 Thousands/µL      Monocytes Absolute 0 80 Thousand/µL      Eosinophils Absolute 0 12 Thousand/µL      Basophils Absolute 0 07 Thousands/µL                  XR chest 1 view portable    (Results Pending)              Procedures  Procedures         ED Course                               SBIRT 22yo+    Flowsheet Row Most Recent Value   SBIRT (25 yo +)    In order to provide better care to our patients, we are screening all of our patients for alcohol and drug use  Would it be okay to ask you these screening questions?  No Filed at: 03/06/2023 1830                    Medical Decision Making  ekg shows rapid a fib - improved with cardizem bolus and drip    I discussed the case with Dr Lillian Dobbins, neurologist on call, who agrees with plan to do a MRI in am, and admit to medicine    Abnormal CT of brain: acute illness or injury  New onset atrial fibrillation [facility]): acute illness or injury  Persistent headaches: acute illness or injury  Rapid atrial fibrillation [facility]): acute illness or injury  Amount and/or Complexity of Data Reviewed  Labs: ordered  Radiology: ordered  Risk  Prescription drug management  Decision regarding hospitalization  Disposition  Final diagnoses:   Persistent headaches   Rapid atrial fibrillation (HCC)   Abnormal CT of brain   New onset atrial fibrillation ([facility] [address] )     Time reflects when diagnosis was documented in both MDM as applicable and the Disposition within this note     Time User Action Codes Description Comment    3/6/2023  6:27 PM Cristian Lombardo R Add [R51 9] Persistent headaches     3/6/2023  7:42 PM Ron Georges R Add [I48 91] Rapid atrial fibrillation ([facility] [address] )     3/6/2023  7:42 PM Ron Georges R Add [R90 89] Abnormal CT of brain     3/6/2023  7:43 PM Ron Georges R Add [I48 91] New onset atrial fibrillation [facility])       ED Disposition     ED Disposition   Admit    Condition   Stable    Date/Time   Mon Mar 6, 2023  7:43 PM    Comment   Case was discussed with MARTÍN  and the patient's admission status was agreed to be inpt /tele         Follow-up Information    None         Patient's Medications   Discharge Prescriptions    No medications on file       No discharge procedures on file      PDMP Review       Value Time User    PDMP Reviewed  Yes 3/6/2023  8:27 PM Michael Padron PA-C          ED Provider  Electronically Signed by           Porsha Banda MD  03/06/23 0650

## 2023-03-07 ENCOUNTER — APPOINTMENT (INPATIENT)
Dept: NON INVASIVE DIAGNOSTICS | Facility: HOSPITAL | Age: 68
End: 2023-03-07

## 2023-03-07 ENCOUNTER — APPOINTMENT (INPATIENT)
Dept: MRI IMAGING | Facility: HOSPITAL | Age: 68
End: 2023-03-07

## 2023-03-07 PROBLEM — I63.9 OCCIPITAL STROKE (HCC): Status: ACTIVE | Noted: 2023-03-06

## 2023-03-07 PROBLEM — I63.432 CEREBROVASCULAR ACCIDENT (CVA) DUE TO EMBOLISM OF LEFT POSTERIOR CEREBRAL ARTERY (HCC): Status: ACTIVE | Noted: 2023-03-07

## 2023-03-07 PROBLEM — I63.40 CEREBROVASCULAR ACCIDENT (CVA) DUE TO EMBOLISM OF CEREBRAL ARTERY (HCC): Status: ACTIVE | Noted: 2023-03-06

## 2023-03-07 LAB
ANION GAP SERPL CALCULATED.3IONS-SCNC: 8 MMOL/L (ref 4–13)
AORTIC ROOT: 2.7 CM
AORTIC VALVE MEAN VELOCITY: 8.3 M/S
APICAL FOUR CHAMBER EJECTION FRACTION: 56 %
ASCENDING AORTA: 2.7 CM
AV AREA BY CONTINUOUS VTI: 2.2 CM2
AV AREA PEAK VELOCITY: 2.4 CM2
AV LVOT MEAN GRADIENT: 1 MMHG
AV LVOT PEAK GRADIENT: 2 MMHG
AV MEAN GRADIENT: 3 MMHG
AV PEAK GRADIENT: 6 MMHG
AV VALVE AREA: 2.25 CM2
AV VELOCITY RATIO: 0.63
BUN SERPL-MCNC: 10 MG/DL (ref 5–25)
CALCIUM SERPL-MCNC: 8.7 MG/DL (ref 8.4–10.2)
CHLORIDE SERPL-SCNC: 107 MMOL/L (ref 96–108)
CO2 SERPL-SCNC: 23 MMOL/L (ref 21–32)
CREAT SERPL-MCNC: 0.54 MG/DL (ref 0.6–1.3)
DOP CALC AO PEAK VEL: 1.21 M/S
DOP CALC AO VTI: 20 CM
DOP CALC LVOT AREA: 3.8 CM2
DOP CALC LVOT DIAMETER: 2.2 CM
DOP CALC LVOT PEAK VEL VTI: 11.83 CM
DOP CALC LVOT PEAK VEL: 0.76 M/S
DOP CALC LVOT STROKE INDEX: 24.5 ML/M2
DOP CALC LVOT STROKE VOLUME: 44.95 CM3
ERYTHROCYTE [DISTWIDTH] IN BLOOD BY AUTOMATED COUNT: 13.6 % (ref 11.6–15.1)
EST. AVERAGE GLUCOSE BLD GHB EST-MCNC: 114 MG/DL
FRACTIONAL SHORTENING: 37 % (ref 28–44)
GFR SERPL CREATININE-BSD FRML MDRD: 97 ML/MIN/1.73SQ M
GLUCOSE SERPL-MCNC: 108 MG/DL (ref 65–140)
HBA1C MFR BLD: 5.6 %
HCT VFR BLD AUTO: 38.4 % (ref 34.8–46.1)
HGB BLD-MCNC: 12.8 G/DL (ref 11.5–15.4)
INTERVENTRICULAR SEPTUM IN DIASTOLE (PARASTERNAL SHORT AXIS VIEW): 1.1 CM
INTERVENTRICULAR SEPTUM: 1.1 CM (ref 0.6–1.1)
LAAS-AP2: 23.3 CM2
LAAS-AP4: 24.2 CM2
LEFT ATRIUM SIZE: 3.6 CM
LEFT INTERNAL DIMENSION IN SYSTOLE: 2.4 CM (ref 2.1–4)
LEFT VENTRICULAR INTERNAL DIMENSION IN DIASTOLE: 3.8 CM (ref 3.5–6)
LEFT VENTRICULAR POSTERIOR WALL IN END DIASTOLE: 1.1 CM
LEFT VENTRICULAR STROKE VOLUME: 42 ML
LVSV (TEICH): 42 ML
MCH RBC QN AUTO: 29.3 PG (ref 26.8–34.3)
MCHC RBC AUTO-ENTMCNC: 33.3 G/DL (ref 31.4–37.4)
MCV RBC AUTO: 88 FL (ref 82–98)
PA SYSTOLIC PRESSURE: 34 MMHG
PLATELET # BLD AUTO: 324 THOUSANDS/UL (ref 149–390)
PMV BLD AUTO: 9.7 FL (ref 8.9–12.7)
POTASSIUM SERPL-SCNC: 3.8 MMOL/L (ref 3.5–5.3)
RA PRESSURE ESTIMATED: 5 MMHG
RBC # BLD AUTO: 4.37 MILLION/UL (ref 3.81–5.12)
RIGHT ATRIUM AREA SYSTOLE A4C: 19.8 CM2
RIGHT VENTRICLE ID DIMENSION: 4.6 CM
RV PSP: 34 MMHG
SL CV LEFT ATRIUM LENGTH A2C: 5.9 CM
SL CV LV EF: 54
SL CV PED ECHO LEFT VENTRICLE DIASTOLIC VOLUME (MOD BIPLANE) 2D: 63 ML
SL CV PED ECHO LEFT VENTRICLE SYSTOLIC VOLUME (MOD BIPLANE) 2D: 21 ML
SODIUM SERPL-SCNC: 138 MMOL/L (ref 135–147)
TR MAX PG: 29 MMHG
TR PEAK VELOCITY: 2.7 M/S
TRICUSPID ANNULAR PLANE SYSTOLIC EXCURSION: 2.3 CM
TRICUSPID VALVE PEAK REGURGITATION VELOCITY: 2.71 M/S
WBC # BLD AUTO: 11.05 THOUSAND/UL (ref 4.31–10.16)

## 2023-03-07 RX ORDER — ENOXAPARIN SODIUM 100 MG/ML
40 INJECTION SUBCUTANEOUS DAILY
Status: DISCONTINUED | OUTPATIENT
Start: 2023-03-07 | End: 2023-03-11 | Stop reason: HOSPADM

## 2023-03-07 RX ORDER — ACETAMINOPHEN 325 MG/1
650 TABLET ORAL EVERY 6 HOURS PRN
Status: DISCONTINUED | OUTPATIENT
Start: 2023-03-07 | End: 2023-03-11 | Stop reason: HOSPADM

## 2023-03-07 RX ORDER — POLYETHYLENE GLYCOL 3350 17 G/17G
17 POWDER, FOR SOLUTION ORAL DAILY
Status: DISCONTINUED | OUTPATIENT
Start: 2023-03-07 | End: 2023-03-11 | Stop reason: HOSPADM

## 2023-03-07 RX ORDER — METOPROLOL TARTRATE 5 MG/5ML
5 INJECTION INTRAVENOUS EVERY 4 HOURS PRN
Status: DISCONTINUED | OUTPATIENT
Start: 2023-03-07 | End: 2023-03-11 | Stop reason: HOSPADM

## 2023-03-07 RX ORDER — ASPIRIN 81 MG/1
81 TABLET ORAL DAILY
Status: DISCONTINUED | OUTPATIENT
Start: 2023-03-08 | End: 2023-03-11 | Stop reason: HOSPADM

## 2023-03-07 RX ORDER — ATORVASTATIN CALCIUM 40 MG/1
40 TABLET, FILM COATED ORAL
Status: DISCONTINUED | OUTPATIENT
Start: 2023-03-07 | End: 2023-03-11 | Stop reason: HOSPADM

## 2023-03-07 RX ORDER — ONDANSETRON 2 MG/ML
4 INJECTION INTRAMUSCULAR; INTRAVENOUS EVERY 6 HOURS PRN
Status: DISCONTINUED | OUTPATIENT
Start: 2023-03-07 | End: 2023-03-11 | Stop reason: HOSPADM

## 2023-03-07 RX ORDER — ASPIRIN 325 MG
325 TABLET ORAL ONCE
Status: COMPLETED | OUTPATIENT
Start: 2023-03-07 | End: 2023-03-07

## 2023-03-07 RX ORDER — NICOTINE 21 MG/24HR
1 PATCH, TRANSDERMAL 24 HOURS TRANSDERMAL DAILY
Status: DISCONTINUED | OUTPATIENT
Start: 2023-03-07 | End: 2023-03-11 | Stop reason: HOSPADM

## 2023-03-07 RX ORDER — METOPROLOL SUCCINATE 25 MG/1
25 TABLET, EXTENDED RELEASE ORAL EVERY 12 HOURS
Status: DISCONTINUED | OUTPATIENT
Start: 2023-03-07 | End: 2023-03-07

## 2023-03-07 RX ORDER — MAGNESIUM HYDROXIDE/ALUMINUM HYDROXICE/SIMETHICONE 120; 1200; 1200 MG/30ML; MG/30ML; MG/30ML
30 SUSPENSION ORAL EVERY 6 HOURS PRN
Status: DISCONTINUED | OUTPATIENT
Start: 2023-03-07 | End: 2023-03-11 | Stop reason: HOSPADM

## 2023-03-07 RX ORDER — METOPROLOL SUCCINATE 25 MG/1
25 TABLET, EXTENDED RELEASE ORAL EVERY 12 HOURS SCHEDULED
Status: DISCONTINUED | OUTPATIENT
Start: 2023-03-07 | End: 2023-03-11 | Stop reason: HOSPADM

## 2023-03-07 RX ADMIN — METOPROLOL TARTRATE 25 MG: 25 TABLET, FILM COATED ORAL at 09:19

## 2023-03-07 RX ADMIN — ENOXAPARIN SODIUM 40 MG: 100 INJECTION SUBCUTANEOUS at 12:30

## 2023-03-07 RX ADMIN — METOPROLOL SUCCINATE 25 MG: 25 TABLET, EXTENDED RELEASE ORAL at 22:24

## 2023-03-07 RX ADMIN — METOROPROLOL TARTRATE 5 MG: 5 INJECTION, SOLUTION INTRAVENOUS at 16:58

## 2023-03-07 RX ADMIN — GADOBUTROL 7 ML: 604.72 INJECTION INTRAVENOUS at 09:58

## 2023-03-07 RX ADMIN — ASPIRIN 325 MG ORAL TABLET 325 MG: 325 PILL ORAL at 01:00

## 2023-03-07 NOTE — CONSULTS
Consult - Cardiology   Lynette Nguyen 80 y o  female MRN: [id number]  Unit/Bed#: ICU 12 Encounter: [id number]        Reason For Consult: Atrial fibrillation               Assessment:  Acute/subacute CVA: Suspected embolic (cardioembolic)   --CTA 9/5/5535:    *Occlusion distal P2 segment of the left posterior cerebral artery    *Increase in size of low-attenuation region of the left occipital lobe C/W evolving acute infarct   --MRI 3/7/2023    *Large acute/subacute left PCA territory infarct  Associated edema without significant mass effect or hemorrhagic transformation  *2 tiny acute/subacute cortical infarcts in the posterior left frontal lobe  *Small right parietal cortical gliosis suggesting sequelae of remote infarct    Atrial fibrillation: RVR POA   New Diagnosis   No EtOH, normal TSH, smoker with signs of SARAH   With [id number] score of 4 anticoagulation is indicated ~~> neurology recommends deferring initiation until 3/19     Dyslipidemia    Lipids 3/6/2023: Cholesterol 227, triglycerides 73, HDL 54, calculated     On no Rx PTA--> statin initiated 3/7/2023    active tobacco use PTA, COPD by Hx:    Currently 1ppd    Discussion / Plan:  # Patient with new diagnosis of atrial fibrillation with poor recognition of same and RVR present on arrival  #  The natural history of atrial fibrillation, likelihood of future recurrence, risk factors including structural heart disease, alcohol, and SARAH were discussed, as were therapeutic options  • Echocardiogram forthcoming  • With CVA and new atrial fibrillation the patient will require anticoagulation ~~> neurology recommendation is to wait approximately 2 weeks and on 3/19 begin apixaban (ASA until then)  · Patient initiated on oral AV blocking Rx this morning-Lopressor 25 mg twice daily    Presently with ventricular rate trend  ~~> will change Lopressor to long-acting formulation with as needed IV Lopressor assessing need for further titration tomorrow  Patient does not have spontaneous conversion we can give future consideration to cardioversion though with favorable response to AV blocking medications this can likely be deferred  Eventual referral to electrophysiology to discuss therapeutic options-including ablation  Initiated on a statin this hospitalization  Advised outpatient follow-up with PCP to pursue a sleep study          History Of Present Illness:  Yvette Victor is a 28-year-old without recent care by a PCP  History as highlighted in assessment above noting limited remote prior cardiology care as follows  In 2019 she was seen by [place] [address] cardiologist Dr Angeli Elliott for cardiac evaluation related to her work as a  with annual physical that year apparently showing some evidence of an irregular heartbeat with an ECG demonstrating infrequent APCs with no ischemic change  ECG also offered an interpretation of long QT though it was the cardiologist's impression that this was not true with QTc of less than 400 ms  She was referred for echocardiogram and Holter monitor     -- 24-hour Holter(5/9/2019):  Average heart rate 81 bpm (range )  Ventricular ectopy: 30 beats-3 triplets, 27 single VPCs  Supraventricular ectopic activity: 1051 beats-43 were in 11 runs, 38 and atrial couplets, 956 single APCs, 4 and trigeminy  Longest supraventricular run 8 beats at a maximum HR of 133 bpm   Fastest was 3 beats at rate of 185      -- Echo 5/1/2019: LVEF 65-70%, no RWMA  Less than or equal to mild regurgitation of all 4 valves  On Sunday, 3/5/2023, the patient came to emergency department reporting 2 days of persistent headache with some vague visual disturbance which was unlike her usual pattern of headache    In the ED a CT of the head was performed reporting ill-defined hypodense attenuation in the left occipital lobe 2 6 x 2 4 cm and new from 2010 possibly representing focal brain lesion or age-indeterminate infarct for which contrast-enhanced MRI was recommended  Allegedly overwhelmed by this news the patient left the emergency department against medical advice  The following evening, 3/6/2023, the patient returned to the emergency department for reassessment with complaints of continued but improved mild headache  While in the ED she was observed to have narrow complex tachycardia and for these she was admitted to the hospital for further evaluation  Subsequent neuroimaging is consistent with CVA and 2 distinct territories suggesting embolic cause with suspicion for cardioembolic source in light of new AF  Patient seems rather unaware of her heart rate and rhythm at the time my visit  She denies any prior near-syncope or syncope, chest pain, recent dyspnea or signs/symptoms of decompensated CHF  Past Medical History:        Past Medical History:   Diagnosis Date   • Migraine    • Premature supraventricular beats     History reviewed  No pertinent surgical history  Allergy:        No Known Allergies    Medications:       Prior to Admission medications    Medication Sig Start Date End Date Taking? Authorizing Provider   butalbital-acetaminophen-caffeine-codeine (FIORICET WITH CODEINE) -31-30 MG per capsule Take 1 capsule by mouth every 6 (six) hours as needed for headaches for up to 10 days 3/5/23 3/15/23  Indira Barroso MD   fexofenadine (ALLEGRA) 180 MG tablet Take 180 mg by mouth daily    Historical Provider, MD   naproxen sodium (ALEVE) 220 MG tablet Take 220 mg by mouth 2 (two) times a day with meals    Historical Provider, MD       Family History:     Family History   Problem Relation Age of Onset   • Colon cancer Father    • Colon cancer Brother         Social History:       Social History     Socioeconomic History   • Marital status:       Spouse name: None   • Number of children: 1   • Years of education: None   • Highest education level: None Occupational History   • Occupation: EMPLOYED   Tobacco Use   • Smoking status: Every Day     Packs/day: 1 00     Years: 40 00     Pack years: 40 00     Types: Cigarettes   • Smokeless tobacco: Never   Vaping Use   • Vaping Use: Every day   Substance and Sexual Activity   • Alcohol use: No     Comment: (HISTORY)   • Drug use: No   • Sexual activity: None   Other Topics Concern   • None   Social History Narrative     [facility]     Social Determinants of Health     Financial Resource Strain: Not on file   Food Insecurity: Not on file   Transportation Needs: Not on file   Physical Activity: Not on file   Stress: Not on file   Social Connections: Not on file   Intimate Partner Violence: Not on file   Housing Stability: Not on file       ROS:  Symptoms per HPI  Presently smoking 1 pack/day   She does not drink alcohol  Patient acknowledges snoring and nocturnal arousals  The remainder of the review of systems is negative    Exam:  General:  Alert, normally conversant, comfortable appearing  Head: Normocephalic, atraumatic  Eyes:  EOMI  Pupils - equal, round, reactive to accomodation  No icterus  Normal Conjunctiva  Oropharynx: Moist without lesion  Neck:  No gross bruit, JVD, thyromegaly, or lymphadenopathy  Heart: Irregular with increased rate  Somewhat distant heart tones but no rub nor significant pathologic murmur detected  Lungs:  Clear without rales/rhonchi/wheeze  Abdomen:  Soft and nontender with normal bowel sounds  No organomegaly or mass  Lower Limbs:  No edema  Pulses[de-identified]  RLE - DP:  2+                 LLE - DP:  2+  Musculoskeletal: Independent movement of limbs observed, Formal ROM and strength eval not performed  Neurologic:    Oriented to: person, place, situation  Cranial Nerves: grossly intact - vision, smell, taste, and hearing were not tested       Motor function: grossly normal, symmetric   Sensation: Was not tested    Vitals:    03/07/23 0407 03/07/23 0600 03/07/23 0700 03/07/23 0919   BP: 125/68      BP Location: Right arm      Pulse: 93   (!) 114   Resp: 18      Temp:   97 9 °F (36 6 °C)    TempSrc:   Oral    SpO2: 95%      Weight: 78 4 kg (172 lb 13 5 oz) 78 4 kg (172 lb 13 5 oz)     Height: 5' 4" (1 626 m)              DATA:      -----------    ECG:                    -----------------------------------------------------------------------------------------------------------------------------------------------  Telemetry:   Fibrillation ventricular rate trend         -----------------------------------------------------------------------------------------------------------------------------------------------  Weights: Wt Readings from Last 20 Encounters:   03/07/23 78 4 kg (172 lb 13 5 oz)   03/07/23 78 4 kg (172 lb 13 5 oz)   03/05/23 70 7 kg (155 lb 13 8 oz)   11/11/19 69 9 kg (154 lb 3 2 oz)   04/30/19 65 kg (143 lb 6 4 oz)   04/29/19 66 2 kg (146 lb)   04/25/19 66 8 kg (147 lb 3 2 oz)   11/02/18 68 5 kg (151 lb)   01/27/18 68 4 kg (150 lb 12 8 oz)   12/16/17 69 9 kg (154 lb)   11/25/17 71 2 kg (157 lb)   09/21/17 72 6 kg (160 lb)   03/01/17 71 7 kg (158 lb)   06/29/16 65 8 kg (145 lb 2 1 oz)   03/26/15 72 6 kg (160 lb 2 1 oz)   10/04/14 73 9 kg (163 lb)   07/11/14 73 6 kg (162 lb 4 oz)   04/04/13 72 7 kg (160 lb 4 oz)   , Body mass index is 29 67 kg/m²           Lab Studies:           Results from last 7 days   Lab Units 03/06/23  2232   TRIGLYCERIDES mg/dL 73   HDL mg/dL 54     Results from last 7 days   Lab Units 03/07/23  0426 03/06/23  1830   WBC Thousand/uL 11 05* 11 26*   HEMOGLOBIN g/dL 12 8 13 5   HEMATOCRIT % 38 4 40 6   PLATELETS Thousands/uL 324 362   ,   Results from last 7 days   Lab Units 03/07/23  0426 03/06/23  1830   POTASSIUM mmol/L 3 8 3 8   CHLORIDE mmol/L 107 105   CO2 mmol/L 23 22   BUN mg/dL 10 12   CREATININE mg/dL 0 54* 0 62   CALCIUM mg/dL 8 7 9 3   ALK PHOS U/L  --  72   ALT U/L  --  12   AST U/L  --  21

## 2023-03-07 NOTE — ASSESSMENT & PLAN NOTE
• Multiple strokes, embolic in the setting of atrial fibrillation   • Per discussion with Dr Juju Velez, due to longstanding history of smoking, check CT of chest abdomen and pelvis rule out underlying malignancy with hypercoagulable state    • Plan to continue aspirin 81 mg daily and switch to anticoagulation in 2 weeks due to atrial fibrillation  • Due to size of the strokes, would not start anticoagulation  Now  • Continue the rest of the stroke pathway  • Allow permissive hypertension  • Findings discussed with daughter

## 2023-03-07 NOTE — ASSESSMENT & PLAN NOTE
New onset mass, long history of migraines w/ auras  Recently had severe migraine with visual auras, word finding difficulty  Denies loss of visual fields, slurred speech, weakness/sensation deficits  Is far behind on routine colon/breast/cervical cancer screenings      Plan:  • CVA vs primary brain lesion   • F/u with MRI  • Consult: neurology

## 2023-03-07 NOTE — PROGRESS NOTES
Lisa 49  Progress Note Camilla Jaramillo 1955, 80 y o  female MRN: [id number]  Unit/Bed#: ICU 12 Encounter: [id number]  Primary Care Provider: No primary care provider on file  Date and time admitted to hospital: 3/6/2023  5:51 PM    * Cerebrovascular accident (CVA) due to embolism of cerebral artery [facility])  Assessment & Plan  • Multiple strokes, embolic in the setting of atrial fibrillation   · Brain MRI:  Large acute/subacute left PCA territory infarct  Associated edema without significant mass effect or hemorrhagic transformation  Two tiny acute/subacute cortical infarcts in the posterior left frontal lobe  Small right parietal cortical gliosis, suggesting sequela of remote infarct  • Per discussion with Dr Gideon Emmanuel, due to longstanding history of smoking, check CT of chest abdomen and pelvis rule out underlying malignancy with hypercoagulable state  • Plan to continue aspirin 81 mg daily and switch to anticoagulation in 2 weeks due to atrial fibrillation  • Due to size of the strokes, would not start anticoagulation  Now  • Continue the rest of the stroke pathway  • Allow permissive hypertension  • Neurochecks per protocol  • Findings discussed with daughter    New onset a-fib [facility])  Assessment & Plan  · Atrial flutter with acute strokes  · Due to large size of strokes and risk for hemorrhagic conversion, anticoagulation should not be started yet  · Per discussion with neurology, okay to start aspirin 81 mg daily  · Await echocardiogram  · Await formal cardiology evaluation    Hypercholesterolemia  Assessment & Plan  Continue Lipitor 40 mg daily      Tobacco abuse  Assessment & Plan  Continue nicotine patch        VTE Pharmacologic Prophylaxis: VTE Score: 3 Moderate Risk (Score 3-4) - Pharmacological DVT Prophylaxis Ordered: enoxaparin (Lovenox)  Patient Centered Rounds: I performed bedside rounds with nursing staff today    Discussions with Specialists or Other Care Team Provider: Discussed with Dr Trace Elaine    Education and Discussions with Family / Patient: Updated  (daughter) via phone  Total Time Spent on Date of Encounter in care of patient: 35 minutes This time was spent on one or more of the following: performing physical exam; counseling and coordination of care; obtaining or reviewing history; documenting in the medical record; reviewing/ordering tests, medications or procedures; communicating with other healthcare professionals and discussing with patient's family/caregivers  Current Length of Stay: 1 day(s)  Current Patient Status: Inpatient   Certification Statement: The patient will continue to require additional inpatient hospital stay due to Stroke  Discharge Plan: Anticipate discharge in 48-72 hrs to  home PT versus inpatient rehab    Code Status: Level 1 - Full Code    Subjective:    seen and examined during rounds in ICU 12  She was able to go to the bathroom by herself  But she still reports feeling unsteady  She denies any headache    Objective:     Vitals:   Temp (24hrs), Av 8 °F (36 6 °C), Min:97 7 °F (36 5 °C), Max:97 9 °F (36 6 °C)    Temp:  [97 7 °F (36 5 °C)-97 9 °F (36 6 °C)] 97 9 °F (36 6 °C)  HR:  [] 114  Resp:  [18-20] 18  BP: (114-144)/(67-94) 125/68  SpO2:  [95 %-99 %] 95 %  Body mass index is 29 52 kg/m²  Input and Output Summary (last 24 hours): Intake/Output Summary (Last 24 hours) at 3/7/2023 1154  Last data filed at 3/7/2023 0400  Gross per 24 hour   Intake 1088 63 ml   Output --   Net 1088 63 ml       Physical Exam:   Physical Exam  Constitutional:       Appearance: She is not ill-appearing  HENT:      Head: Normocephalic and atraumatic  Nose: No congestion or rhinorrhea  Eyes:      General: No scleral icterus  Cardiovascular:      Rate and Rhythm: Tachycardia present  Rhythm irregular  Pulmonary:      Breath sounds: No wheezing or rhonchi     Abdominal:      General: There is no distension  Palpations: Abdomen is soft  Tenderness: There is no abdominal tenderness  Musculoskeletal:      Cervical back: Neck supple  Right lower leg: No edema  Left lower leg: No edema  Skin:     General: Skin is warm and dry  Coloration: Skin is not jaundiced or pale  Neurological:      Mental Status: She is alert  Comments: Awake alert follows commands  She was able to do finger-to-nose bilaterally  She was able to walk without any walker or cane     Psychiatric:         Behavior: Behavior normal       Comments: Slightly anxious         Additional Data:     Labs:  Results from last 7 days   Lab Units 03/07/23  0426 03/06/23  1830   WBC Thousand/uL 11 05* 11 26*   HEMOGLOBIN g/dL 12 8 13 5   HEMATOCRIT % 38 4 40 6   PLATELETS Thousands/uL 324 362   NEUTROS PCT %  --  67   LYMPHS PCT %  --  24   MONOS PCT %  --  7   EOS PCT %  --  1     Results from last 7 days   Lab Units 03/07/23  0426 03/06/23  1830   SODIUM mmol/L 138 137   POTASSIUM mmol/L 3 8 3 8   CHLORIDE mmol/L 107 105   CO2 mmol/L 23 22   BUN mg/dL 10 12   CREATININE mg/dL 0 54* 0 62   ANION GAP mmol/L 8 10   CALCIUM mg/dL 8 7 9 3   ALBUMIN g/dL  --  4 2   TOTAL BILIRUBIN mg/dL  --  0 51   ALK PHOS U/L  --  72   ALT U/L  --  12   AST U/L  --  21   GLUCOSE RANDOM mg/dL 108 113     Results from last 7 days   Lab Units 03/06/23  1830   INR  0 99         Results from last 7 days   Lab Units 03/06/23  1830   HEMOGLOBIN A1C % 5 6           Lines/Drains:  Invasive Devices     Peripheral Intravenous Line  Duration           Peripheral IV 03/06/23 Left Antecubital <1 day    Peripheral IV 03/06/23 Right Forearm <1 day                  Telemetry:  Telemetry Orders (From admission, onward)             48 Hour Telemetry Monitoring  Continuous x 48 hours        References:    Telemetry Guidelines   Question:  Reason for 48 Hour Telemetry  Answer:  Arrhythmias Requiring Medical Therapy (eg  SVT, Vtach/fib, Bradycardia, Uncontrolled A-fib)                 Telemetry Reviewed: Atrial fibrillation  HR averaging 100s               Imaging: Reviewed radiology reports from this admission including: CT head and MRI brain    Recent Cultures (last 7 days):         Last 24 Hours Medication List:   Current Facility-Administered Medications   Medication Dose Route Frequency Provider Last Rate   • acetaminophen  650 mg Oral Q6H PRN John Chandler PA-C     • aluminum-magnesium hydroxide-simethicone  30 mL Oral Q6H PRN John Chandler PA-C     • atorvastatin  40 mg Oral Daily With Rosita Gibson PA-C     • diltiazem  1-15 mg/hr Intravenous Titrated John Chandler PA-C Stopped (03/07/23 0400)   • enoxaparin  40 mg Subcutaneous Daily John Chandler PA-C     • hydrOXYzine HCL  25 mg Oral Q6H PRN John Chandler PA-C     • melatonin  3 mg Oral HS PRN John Chandler PA-C     • metoprolol tartrate  25 mg Oral Q12H [address] John Chandler PA-C     • nicotine  1 patch Transdermal Daily John Chandler PA-C     • ondansetron  4 mg Intravenous Q6H PRN John Chandler PA-C     • polyethylene glycol  17 g Oral Daily John Chandler PA-C          Today, Patient Was Seen By: Kim Traylor MD    **Please Note: This note may have been constructed using a voice recognition system  **

## 2023-03-07 NOTE — PROGRESS NOTES
Transfer from ICU to [place] [id number]  Assumed care of patient at this time  Telemetry strip printed  Patient and family member comfortable in room at this time  Call bell within reach

## 2023-03-07 NOTE — H&P
[address] [facility] 1955, 80 y o  female MRN: [id number]  Unit/Bed#: ED-02 Encounter: [id number]  Primary Care Provider: No primary care provider on file  Date and time admitted to hospital: 3/6/2023  5:51 PM    * New onset a-fib [facility])  Assessment & Plan  New onset aflutter/AF, no known history of same, Holter in 2019 unfortunately I cannot access monitor report at the moment  Denies chest pain, palpitations  Admit VS/Labs: afebrile, HR 150s rates under control s/p cardizem, normotensive  BMP unremarkable, CBC mild leukocytosis, troponin negative, , TSH wnl  Imaging:   • EKG: initial aflutter/AF HR 150s, telemetry reviewed with AF rates 100s   • CXR: clear without infiltrate, pulmonary edema, pneumothorax noted await rads read     Plan:   • Admit to medicine SD2 for titratable cardizem w/ telemetry  • AF risks include age, smoking  CHADSvAsc: 2   • F/u troponin, obtain TTE   • Rate control: Cardizem gtt, titrate as able, transition to PO  • Anticoagulation: began discussion with patient regarding anticoagulation upon discharge, pending MRI brain r/o hemorrhage      Occipital stroke [facility])  Assessment & Plan  New occipital lesion on prior CTH, long history of migraines w/ auras  Recently had severe migraine with visual auras, word finding difficulty  On admission denies loss of or changing visual fields, slurred speech, weakness/sensation deficits  Dr Berumne Duy paged overnight as he reviewed prior [facility] concerned for CVA recommending CTA head/neck     CTA head/neck: occlusion distal P2 segment of left posterior cerebral artery, increased size of low-attenuation in left occipital lobe, consistent with evolving acute infarct, no acute intracranial hemorrhage     Plan:  • cardioembolic vs atherothrombotic CVA  • Allow for permissive HTN if possible with current HR control, neuro checks   • Meds: ASA load x1, hold on further antiplatelets as she will need anticoagulation for AF, statin started   • Follow MRI  • Consult: neurology    Migraine with aura and without status migrainosus, not intractable  Assessment & Plan  Reports long history of migraines into her childhood  Worsened with recent stressors  Typically comes with visual auras, sometimes speech problems if particularly severe  Current headache is improving isolated to L side of head without neurologic changes    Plan:  • Migraine precautions, dim lights, silence sounds if necessary  • Analgesia PRN      Anxiety  Assessment & Plan  Anxious about new diagnoses, recently lost her sister in-law   · Anxiolytic PRN    Tobacco abuse  Assessment & Plan  Ongoing nicotine use 1PPD, nicotine replacement ordered     Hypercholesterolemia  Assessment & Plan  Hx of above, check lipid panel/a1c     VTE Pharmacologic Prophylaxis: VTE Score: 3 Moderate Risk (Score 3-4) - Pharmacological DVT Prophylaxis Ordered: enoxaparin (Lovenox)  Code Status: Full Code   Discussion with family: Updated  (daughter) at bedside  Anticipated Length of Stay: Patient will be admitted on an inpatient basis with an anticipated length of stay of greater than 2 midnights secondary to occipital mass, new onset AF  Total Time Spent on Date of Encounter in care of patient: 65 minutes This time was spent on one or more of the following: performing physical exam; counseling and coordination of care; obtaining or reviewing history; documenting in the medical record; reviewing/ordering tests, medications or procedures; communicating with other healthcare professionals and discussing with patient's family/caregivers  Chief Complaint: headache    History of Present Illness:  Kervin Herrera is a 80 y o  female with a PMH of migraines, HLD, others noted below who presents with headache  Initially presented on 03/05/2023 for worsening headache waxing/waning for 2 days prior   Does have history of migraines since childhood but this was not resolving which concerned her  In the ED yesterday a CTH was performed showing a new left occipital lobe lesion, after receiving this news she signed out AMA due to being stressed/overwhelmed  She returned tonight for reevaluation  On arrival to ED [facility] it was also found she was in new onset rapid atrial fibrillation, she denies history of this  Continues to have a mild headache on my exam but localized on the left side and slowly improving  Denies chest pain, shortness of breath, palpitations  Denies fevers/chills or recent sick contacts  Overwhelmed by stress as she was at  for sister in-law 2 days prior  Continues tobacco use, intermittent alcohol use  Review of Systems:  Review of Systems   Constitutional: Negative for chills and fever  HENT: Negative for ear pain and sore throat  Eyes: Negative for pain and visual disturbance  Respiratory: Negative for cough and shortness of breath  Cardiovascular: Negative for chest pain and palpitations  Gastrointestinal: Negative for abdominal pain and vomiting  Genitourinary: Negative for dysuria and hematuria  Musculoskeletal: Negative for arthralgias and back pain  Skin: Negative for color change and rash  Neurological: Positive for headaches  Negative for seizures and syncope  All other systems reviewed and are negative  Past Medical and Surgical History:   Past Medical History:   Diagnosis Date   • Migraine    • Premature supraventricular beats        No past surgical history on file  Meds/Allergies:  Prior to Admission medications    Medication Sig Start Date End Date Taking?  Authorizing Provider   butalbital-acetaminophen-caffeine-codeine (FIORICET WITH CODEINE) -04-30 MG per capsule Take 1 capsule by mouth every 6 (six) hours as needed for headaches for up to 10 days 3/5/23 3/15/23  Jael Moses MD   fexofenadine (ALLEGRA) 180 MG tablet Take 180 mg by mouth daily    Historical Provider, MD naproxen sodium (ALEVE) 220 MG tablet Take 220 mg by mouth 2 (two) times a day with meals    Historical Provider, MD     I have reviewed home medications with patient personally  Allergies: No Known Allergies    Social History:  Marital Status:    Occupation:   Patient Pre-hospital Living Situation: Home  Patient Pre-hospital Level of Mobility: walks  Patient Pre-hospital Diet Restrictions:   Substance Use History:   Social History     Substance and Sexual Activity   Alcohol Use No    Comment: (HISTORY)     Social History     Tobacco Use   Smoking Status Every Day   • Packs/day: 1 00   • Years: 40 00   • Pack years: 40 00   • Types: Cigarettes   Smokeless Tobacco Never     Social History     Substance and Sexual Activity   Drug Use No       Family History:  Family History   Problem Relation Age of Onset   • Colon cancer Father    • Colon cancer Brother        Physical Exam:     Vitals:   Blood Pressure: 116/77 (03/07/23 0019)  Pulse: 91 (03/07/23 0019)  Temperature: 97 7 °F (36 5 °C) (03/06/23 1824)  Temp Source: Oral (03/06/23 1824)  Respirations: 20 (03/07/23 0019)  SpO2: 98 % (03/07/23 0019)    Physical Exam  Vitals and nursing note reviewed  Constitutional:       General: She is not in acute distress  Appearance: She is well-developed  HENT:      Head: Normocephalic and atraumatic  Eyes:      Conjunctiva/sclera: Conjunctivae normal       Pupils: Pupils are equal, round, and reactive to light  Cardiovascular:      Rate and Rhythm: Normal rate and regular rhythm  Heart sounds: No murmur heard  Pulmonary:      Effort: Pulmonary effort is normal  No respiratory distress  Breath sounds: Normal breath sounds  Abdominal:      Palpations: Abdomen is soft  Tenderness: There is no abdominal tenderness  Musculoskeletal:         General: No swelling  Cervical back: Neck supple  Right lower leg: No edema  Left lower leg: No edema     Skin:     General: Skin is warm and dry  Capillary Refill: Capillary refill takes less than 2 seconds  Neurological:      Mental Status: She is alert and oriented to person, place, and time  Cranial Nerves: No cranial nerve deficit  Sensory: No sensory deficit  Coordination: Coordination normal    Psychiatric:         Mood and Affect: Mood normal           Additional Data:     Lab Results:  Results from last 7 days   Lab Units 03/06/23  1830   WBC Thousand/uL 11 26*   HEMOGLOBIN g/dL 13 5   HEMATOCRIT % 40 6   PLATELETS Thousands/uL 362   NEUTROS PCT % 67   LYMPHS PCT % 24   MONOS PCT % 7   EOS PCT % 1     Results from last 7 days   Lab Units 03/06/23  1830   SODIUM mmol/L 137   POTASSIUM mmol/L 3 8   CHLORIDE mmol/L 105   CO2 mmol/L 22   BUN mg/dL 12   CREATININE mg/dL 0 62   ANION GAP mmol/L 10   CALCIUM mg/dL 9 3   ALBUMIN g/dL 4 2   TOTAL BILIRUBIN mg/dL 0 51   ALK PHOS U/L 72   ALT U/L 12   AST U/L 21   GLUCOSE RANDOM mg/dL 113     Results from last 7 days   Lab Units 03/06/23  1830   INR  0 99                   Lines/Drains:  Invasive Devices     Peripheral Intravenous Line  Duration           Peripheral IV 03/06/23 Left Antecubital <1 day    Peripheral IV 03/06/23 Right Forearm <1 day                    Imaging: Reviewed radiology reports from this admission including: chest xray and CT head and Personally reviewed the following imaging: chest xray and CT head  CTA head and neck w wo contrast   Final Result by Lyric Rizo DO (03/06 4704)      Occlusion distal P2 segment of the left posterior cerebral artery  Increased size of low-attenuation region in the left occipital lobe, consistent with evolving acute infarction  Recommend MRI when clinically appropriate for complete evaluation  No acute intracranial hemorrhage                     I personally discussed this study with Day Meng on 3/6/2023 at 11:58 PM                Workstation performed: [id number]         XR chest 1 view portable    (Results Pending)   MRI inpatient order    (Results Pending)       EKG and Other Studies Reviewed on Admission:   · EKG: Atrial fibrillation    ** Please Note: This note has been constructed using a voice recognition system   **

## 2023-03-07 NOTE — ASSESSMENT & PLAN NOTE
· Atrial flutter with acute strokes  · Due to large size of strokes and risk for hemorrhagic conversion, anticoagulation should not be started yet    · Per discussion with neurology, okay to start aspirin 81 mg daily  · Await echocardiogram  · Await formal cardiology evaluation

## 2023-03-07 NOTE — CASE MANAGEMENT
Case Management Assessment & Discharge Planning Note    Patient name Kailash Jain  Location ICU 12/ICU 12 MRN [id number]  : 1955 Date 3/7/2023       Current Admission Date: 3/6/2023  Current Admission Diagnosis:Cerebrovascular accident (CVA) due to embolism of cerebral artery [facility])   Patient Active Problem List    Diagnosis Date Noted   • Cerebrovascular accident (CVA) due to embolism of left posterior cerebral artery ([facility] [address] ) 2023   • New onset a-fib ([address] ) 2023   • Cerebrovascular accident (CVA) due to embolism of cerebral artery ([facility] [address] ) 2023   • Migraine with aura and without status migrainosus, not intractable 2023   • Acute upper respiratory infection 2019   • Premature supraventricular beats 2019   • Long QT interval 2019   • Tremor of both hands 2019   • Head movements abnormal 2019   • Depression, recurrent ([facility] [address] ) 2017   • Tobacco abuse 2017   • Allergic rhinitis 2013   • Hypercholesterolemia 2013      LOS (days): 1  Geometric Mean LOS (GMLOS) (days): 3 40  Days to GMLOS:2 7     OBJECTIVE:    Risk of Unplanned Readmission Score: 6 36         Current admission status: Inpatient       Preferred Pharmacy:   [facility] [address] [place] [address]  Phone: [phone] Fax: 0 [place] [address]  Phone: [phone] Fax: 309 [facility] [address], [place] [address]  Phone: [phone] Fax: [phone]    Primary Care Provider: No primary care provider on file      Primary Insurance: MEDICARE  Secondary Insurance:     ASSESSMENT:  Active Health Care Proxies     [facility] Representative - Daughter   Primary Phone: [phone] (Mobile)               Advance Directives  Does patient have a Health Care POA?: No  Was patient offered paperwork?: Yes  Does patient currently have a Health Care decision maker?: Yes, please see Health Care Proxy section  Does patient have Advance Directives?: No  Was patient offered paperwork?: Yes  Primary Contact: Daughter Gardner Goodell [phone]         Readmission Root Cause  30 Day Readmission: No    Patient Information  Admitted from[de-identified] Home  Mental Status: Alert  During Assessment patient was accompanied by: Not accompanied during assessment  Assessment information provided by[de-identified] Patient  Primary Caregiver: Self  Support Systems: Daughter  [place] of Residence: [address] [employer] do you live in?: [address] entry access options   Select all that apply : Stairs  Number of steps to enter home : 2  Do the steps have railings?: No  Type of Current Residence: Apartment  Floor Level: 1  Upon entering residence, is there a bedroom on the main floor (no further steps)?: Yes  Upon entering residence, is there a bathroom on the main floor (no further steps)?: Yes  In the last 12 months, was there a time when you were not able to pay the mortgage or rent on time?: No  In the last 12 months, how many places have you lived?: 1  In the last 12 months, was there a time when you did not have a steady place to sleep or slept in a shelter (including now)?: No  Homeless/housing insecurity resource given?: N/A  Living Arrangements: Lives w/ Daughter    Activities of Daily Living Prior to Admission  Functional Status: Independent  Completes ADLs independently?: Yes  Ambulates independently?: Yes  Does patient use assisted devices?: No  Does patient currently own DME?: No  Does patient have a history of Outpatient Therapy (PT/OT)?: No  Does the patient have a history of Short-Term Rehab?: No  Does patient have a history of HHC?: No  Does patient currently have [address]?: No         Patient Information Continued  Income Source: Employed ( for [employer])  Does patient have prescription coverage?: Yes  Within the past 12 months, you worried that your food would run out before you got the money to buy more : Never true  Within the past 12 months, the food you bought just didn't last and you didn't have money to get more : Never true  Food insecurity resource given?: N/A  Does patient receive dialysis treatments?: No  Does patient have a history of substance abuse?: No  Does patient have a history of Mental Health Diagnosis?: Yes (depression from 2017 - not currently experiencing)         Means of Transportation  Means of Transport to Appts[de-identified] Drives Self  In the past 12 months, has lack of transportation kept you from medical appointments or from getting medications?: No  In the past 12 months, has lack of transportation kept you from meetings, work, or from getting things needed for daily living?: No  Was application for public transport provided?: N/A        DISCHARGE DETAILS:    Discharge planning discussed with[de-identified] Patient  Freedom of Choice: Yes     CM contacted family/caregiver?: No- see comments (daughter visiting hospital but out of room during assessment - pt will relay information to dtr)                       DME Referral Provided  Referral made for DME?: No    Other Referral/Resources/Interventions Provided:  Interventions: HHC, Short Term Rehab  Referral Comments: [place] referrals sent - PT/OT pending                                                      Additional Comments: CM met with pt at bedside  No PCP on file - pt confirmed her PCP is [facility]  CM informed pt that PT/OT would be completing evaluations and CM would follow up after recommendations are provided  [place] referrals placed for HHC/STR  CM dept to follow

## 2023-03-07 NOTE — PLAN OF CARE
Problem: Potential for Falls  Goal: Patient will remain free of falls  Description: INTERVENTIONS:  - Educate patient/family on patient safety including physical limitations  - Instruct patient to call for assistance with activity   - Consult OT/PT to assist with strengthening/mobility   - Keep Call bell within reach  - Keep bed low and locked with side rails adjusted as appropriate  - Keep care items and personal belongings within reach  - Initiate and maintain comfort rounds  - Make Fall Risk Sign visible to staff  - Apply yellow socks and bracelet for high fall risk patients  - Consider moving patient to room near nurses station  Outcome: Progressing     Problem: PAIN - ADULT  Goal: Verbalizes/displays adequate comfort level or baseline comfort level  Description: Interventions:  - Encourage patient to monitor pain and request assistance  - Assess pain using appropriate pain scale  - Administer analgesics based on type and severity of pain and evaluate response  - Implement non-pharmacological measures as appropriate and evaluate response  - Consider cultural and social influences on pain and pain management  - Notify physician/advanced practitioner if interventions unsuccessful or patient reports new pain  Outcome: Progressing     Problem: INFECTION - ADULT  Goal: Absence or prevention of progression during hospitalization  Description: INTERVENTIONS:  - Assess and monitor for signs and symptoms of infection  - Monitor lab/diagnostic results  - Monitor all insertion sites, i e  indwelling lines, tubes, and drains  - Monitor endotracheal if appropriate and nasal secretions for changes in amount and color  - [place] appropriate cooling/warming therapies per order  - Administer medications as ordered  - Instruct and encourage patient and family to use good hand hygiene technique  - Identify and instruct in appropriate isolation precautions for identified infection/condition  Outcome: Progressing  Goal: Absence of fever/infection during neutropenic period  Description: INTERVENTIONS:  - Monitor WBC    Outcome: Progressing     Problem: SAFETY ADULT  Goal: Patient will remain free of falls  Description: INTERVENTIONS:  - Educate patient/family on patient safety including physical limitations  - Instruct patient to call for assistance with activity   - Consult OT/PT to assist with strengthening/mobility   - Keep Call bell within reach  - Keep bed low and locked with side rails adjusted as appropriate  - Keep care items and personal belongings within reach  - Initiate and maintain comfort rounds  - Make Fall Risk Sign visible to staff  - Apply yellow socks and bracelet for high fall risk patients  - Consider moving patient to room near nurses station  Outcome: Progressing  Goal: Maintain or return to baseline ADL function  Description: INTERVENTIONS:  -  Assess patient's ability to carry out ADLs; assess patient's baseline for ADL function and identify physical deficits which impact ability to perform ADLs (bathing, care of mouth/teeth, toileting, grooming, dressing, etc )  - Assess/evaluate cause of self-care deficits   - Assess range of motion  - Assess patient's mobility; develop plan if impaired  - Assess patient's need for assistive devices and provide as appropriate  - Encourage maximum independence but intervene and supervise when necessary  - Involve family in performance of ADLs  - Assess for home care needs following discharge   - Consider OT consult to assist with ADL evaluation and planning for discharge  - Provide patient education as appropriate  Outcome: Progressing  Goal: Maintains/Returns to pre admission functional level  Description: INTERVENTIONS:  - Perform BMAT or MOVE assessment daily    - Set and communicate daily mobility goal to care team and patient/family/caregiver     - Collaborate with rehabilitation services on mobility goals if consulted  - Out of bed for toileting  - Record patient progress and toleration of activity level   Outcome: Progressing     Problem: DISCHARGE PLANNING  Goal: Discharge to home or other facility with appropriate resources  Description: INTERVENTIONS:  - Identify barriers to discharge w/patient and caregiver  - Arrange for needed discharge resources and transportation as appropriate  - Identify discharge learning needs (meds, wound care, etc )  - Arrange for interpretive services to assist at discharge as needed  - Refer to Case Management Department for coordinating discharge planning if the patient needs post-hospital services based on physician/advanced practitioner order or complex needs related to functional status, cognitive ability, or social support system  Outcome: Progressing     Problem: Knowledge Deficit  Goal: Patient/family/caregiver demonstrates understanding of disease process, treatment plan, medications, and discharge instructions  Description: Complete learning assessment and assess knowledge base  Interventions:  - Provide teaching at level of understanding  - Provide teaching via preferred learning methods  Outcome: Progressing     Problem: Neurological Deficit  Goal: Neurological status is stable or improving  Description: Interventions:  - Monitor and assess patient's level of consciousness, motor function, sensory function, and level of assistance needed for ADLs  - Monitor and report changes from baseline  Collaborate with interdisciplinary team to initiate plan and implement interventions as ordered  - Provide and maintain a safe environment  - Consider seizure precautions  - Consider fall precautions  - Consider aspiration precautions  - Consider bleeding precautions  Outcome: Progressing     Problem: Activity Intolerance/Impaired Mobility  Goal: Mobility/activity is maintained at optimum level for patient  Description: Interventions:  - Assess and monitor patient  barriers to mobility and need for assistive/adaptive devices    - Assess patient's emotional response to limitations  - Collaborate with interdisciplinary team and initiate plans and interventions as ordered  - Encourage independent activity per ability   - Maintain proper body alignment  - Perform active/passive rom as tolerated/ordered  - Plan activities to conserve energy   - Turn patient as appropriate  Outcome: Progressing     Problem: Communication Impairment  Goal: Ability to express needs and understand communication  Description: Assess patient's communication skills and ability to understand information  Patient will demonstrate use of effective communication techniques, alternative methods of communication and understanding even if not able to speak  - Encourage communication and provide alternate methods of communication as needed  - Collaborate with case management/ for discharge needs  - Include patient/family/caregiver in decisions related to communication  Outcome: Progressing     Problem: Potential for Aspiration  Goal: Non-ventilated patient's risk of aspiration is minimized  Description: Assess and monitor vital signs, respiratory status, and labs (WBC)  Monitor for signs of aspiration (tachypnea, cough, rales, wheezing, cyanosis, fever)  - Assess and monitor patient's ability to swallow  - Place patient up in chair to eat if possible  - HOB up at 90 degrees to eat if unable to get patient up into chair   - Supervise patient during oral intake  - Instruct patient/ family to take small bites  - Instruct patient/ family to take small single sips when taking liquids  - Follow patient-specific strategies generated by speech pathologist   Outcome: Progressing  Goal: Ventilated patient's risk of aspiration is minimized  Description: Assess and monitor vital signs, respiratory status, airway cuff pressure, and labs (WBC)  Monitor for signs of aspiration (tachypnea, cough, rales, wheezing, cyanosis, fever)      - Elevate head of bed 30 degrees if patient has tube feeding   - Monitor tube feeding  Outcome: Progressing     Problem: Nutrition  Goal: Nutrition/Hydration status is improving  Description: Monitor and assess patient's nutrition/hydration status for malnutrition (ex- brittle hair, bruises, dry skin, pale skin and conjunctiva, muscle wasting, smooth red tongue, and disorientation)  Collaborate with interdisciplinary team and initiate plan and interventions as ordered  Monitor patient's weight and dietary intake as ordered or per policy  Utilize nutrition screening tool and intervene per policy  Determine patient's food preferences and provide high-protein, high-caloric foods as appropriate  - Assist patient with eating   - Allow adequate time for meals   - Encourage patient to take dietary supplement as ordered  - Collaborate with clinical nutritionist   - Include patient/family/caregiver in decisions related to nutrition    Outcome: Progressing     Problem: NEUROSENSORY - ADULT  Goal: Achieves stable or improved neurological status  Description: INTERVENTIONS  - Monitor and report changes in neurological status  - Monitor vital signs such as temperature, blood pressure, glucose, and any other labs ordered   - Initiate measures to prevent increased intracranial pressure  - Monitor for seizure activity and implement precautions if appropriate      Outcome: Progressing  Goal: Remains free of injury related to seizures activity  Description: INTERVENTIONS  - Maintain airway, patient safety  and administer oxygen as ordered  - Monitor patient for seizure activity, document and report duration and description of seizure to physician/advanced practitioner  - If seizure occurs,  ensure patient safety during seizure  - Reorient patient post seizure  - Seizure pads on all 4 side rails  - Instruct patient/family to notify RN of any seizure activity including if an aura is experienced  - Instruct patient/family to call for assistance with activity based on nursing assessment  - Administer anti-seizure medications if ordered    Outcome: Progressing  Goal: Achieves maximal functionality and self care  Description: INTERVENTIONS  - Monitor swallowing and airway patency with patient fatigue and changes in neurological status  - Encourage and assist patient to increase activity and self care     - Encourage visually impaired, hearing impaired and aphasic patients to use assistive/communication devices  Outcome: Progressing     Problem: CARDIOVASCULAR - ADULT  Goal: Maintains optimal cardiac output and hemodynamic stability  Description: INTERVENTIONS:  - Monitor I/O, vital signs and rhythm  - Monitor for S/S and trends of decreased cardiac output  - Administer and titrate ordered vasoactive medications to optimize hemodynamic stability  - Assess quality of pulses, skin color and temperature  - Assess for signs of decreased coronary artery perfusion  - Instruct patient to report change in severity of symptoms  Outcome: Progressing  Goal: Absence of cardiac dysrhythmias or at baseline rhythm  Description: INTERVENTIONS:  - Continuous cardiac monitoring, vital signs, obtain 12 lead EKG if ordered  - Administer antiarrhythmic and heart rate control medications as ordered  - Monitor electrolytes and administer replacement therapy as ordered  Outcome: Progressing

## 2023-03-07 NOTE — CONSULTS
Consultation - Neurology   Vonne Lennox 80 y o  female MRN: [id number]  Unit/Bed#: ICU 12 Encounter: [id number]      Assessment/Plan   Cerebrovascular accident (CVA) due to embolism of left posterior cerebral artery [facility])  Assessment & Plan  80year old female with remote history of migraines, presenting on 3/5 (and again yesterday), after experiencing few days ofa typical headache and vision disturbances (patient non-specific in describing vision changes, but suggests visual field distortion on her R side)  Neuroimaging reveals presence of L occipital/PCA territory infarct with L P2/PCA occlusion from vascular standpoint  Likely cardio-embolic in the setting of new onset a-fib/a-flutter  Neuro exam with R homonymous hemianopsia, otherwise intact  Plan:  -stroke workup initiated:  -CT head on 3/5 with L occipital hypoattenuation  -CTA head/neck with L P2/PCA occlusion  -MRI brain with/without contrast performed this morning; formal read pending but reveals moderate/large L PCA territory infarction  -2D echo pending  -placed on aspirin; recommend initiating oral anticoagulation 14 days post stroke given moderate stroke burden size); that date would be approximately March 18th; would price check Eliquis with CM (full dose)  -can stop aspirin when anticoagulation is started  -continue statin  -hemoglobin A1C of 5 6, lipid panel with LDL of 158  -neuro checks  -telemetry monitoring  -stroke education to be provided  -therapy evaluations  -discussed with patient preliminarily this morning; at this juncture patient is unsafe to drive given R visual field cut; will need to have future ophthalmology evaluations/fitness to drive testing before being deemed safe to drive again  Will submit [employer] form today  Following review of 2D echo, likely no further inpatient neurodiagnostics/workup, but will remain available for questions/concerns/neuro exam changes   Discussed plan of care with attending neurologist      Leticia Jackson will need follow up in in 6 weeks with neurovascular attending or advance practitioner  Will not need outpatient neurologic testing prior to follow up  History of Present Illness     Reason for Consult / Principal Problem: Stroke concern on CT head, headache, vision/speech disturbance    HPI: Leticia Jackson is a 80 y o  female with history as mentioned above in assessment who neurology is asked to evaluate in regards to the above:    History largely obtained via discussion with patient, although limited due to anxiousness this morning  Patient has a remote history of migraines when she was younger, but has been largely resolved for a while  She is a near pack per day smoker, otherwise without significant traditional risk factors  Beginning several days ago approximately on Saturday, she noticed an atypical headache/migraine with ill-defined visual disturbances (due to anxiousness she cannot definitively describe her vision disturbances, but seems to gesture at her right visual field distortion)  She also has noticed some word finding difficulties at home to over the past several days  No focal facial asymmetry nor motor/sensory deficits in the extremities  Given her symptoms she was initially seen on 3/5 for the above  Had the above abnormal CT head and left AMA after admission recommended  Given ongoing symptoms she presented again yesterday for evaluation and admission with MRI  Seen by the  movement disorder team in 2019 for cervical dystonia/tremor  No antiplatelet at home prior to admission  Denies any history of palpitations nor fluttering with new A-fib diagnosis  Patient very anxious and tearful this morning as her daughter is not here; 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  She is quite concerned about losing the ability to drive with her visual deficits   She is near pack per day smoker  Consult to neurology  Consult performed by: Suyapa Dasilva PA-C  Consult ordered by: Porsha Banda MD        Review of Systems   Constitutional: Negative  HENT: Negative  Eyes: Positive for visual disturbance (R visual field cut)  Respiratory: Negative  Cardiovascular: Negative  Gastrointestinal: Negative  Musculoskeletal: Negative  Skin: Negative  Neurological: Negative for dizziness, tremors, seizures, syncope, facial asymmetry, speech difficulty, weakness, light-headedness, numbness and headaches  All other ROS reviewed and negative  Historical Information   Past Medical History:   Diagnosis Date   • Migraine    • Premature supraventricular beats      History reviewed  No pertinent surgical history  Social History   Social History     Substance and Sexual Activity   Alcohol Use No    Comment: (HISTORY)     Social History     Substance and Sexual Activity   Drug Use No     E-Cigarette/Vaping   • E-Cigarette Use Current Every Day User      E-Cigarette/Vaping Substances     Social History     Tobacco Use   Smoking Status Every Day   • Packs/day: 1 00   • Years: 40 00   • Pack years: 40 00   • Types: Cigarettes   Smokeless Tobacco Never     Family History:   Family History   Problem Relation Age of Onset   • Colon cancer Father    • Colon cancer Brother        Review of previous medical records was completed      Meds/Allergies   current meds:   Current Facility-Administered Medications   Medication Dose Route Frequency   • acetaminophen (TYLENOL) tablet 650 mg  650 mg Oral Q6H PRN   • aluminum-magnesium hydroxide-simethicone (MYLANTA) oral suspension 30 mL  30 mL Oral Q6H PRN   • atorvastatin (LIPITOR) tablet 40 mg  40 mg Oral Daily With Dinner   • diltiazem (CARDIZEM) 125 mg in sodium chloride 0 9 % 125 mL infusion  1-15 mg/hr Intravenous Titrated   • enoxaparin (LOVENOX) subcutaneous injection 40 mg  40 mg Subcutaneous Daily   • hydrOXYzine HCL (ATARAX) tablet 25 mg  25 mg Oral Q6H PRN   • melatonin tablet 3 mg  3 mg Oral HS PRN   • metoprolol tartrate (LOPRESSOR) tablet 25 mg  25 mg Oral Q12H CARISA   • nicotine (NICODERM CQ) 21 mg/24 hr TD 24 hr patch 1 patch  1 patch Transdermal Daily   • ondansetron (ZOFRAN) injection 4 mg  4 mg Intravenous Q6H PRN   • polyethylene glycol (MIRALAX) packet 17 g  17 g Oral Daily    and PTA meds:   Prior to Admission Medications   Prescriptions Last Dose Informant Patient Reported? Taking? butalbital-acetaminophen-caffeine-codeine (FIORICET WITH CODEINE) -30-30 MG per capsule   No No   Sig: Take 1 capsule by mouth every 6 (six) hours as needed for headaches for up to 10 days   fexofenadine (ALLEGRA) 180 MG tablet   Yes No   Sig: Take 180 mg by mouth daily   naproxen sodium (ALEVE) 220 MG tablet   Yes No   Sig: Take 220 mg by mouth 2 (two) times a day with meals      Facility-Administered Medications: None       No Known Allergies    Objective   Vitals:Blood pressure 125/68, pulse 93, temperature 97 7 °F (36 5 °C), temperature source Oral, resp  rate 18, height 5' 4" (1 626 m), weight 78 4 kg (172 lb 13 5 oz), SpO2 95 %  ,Body mass index is 29 67 kg/m²  Intake/Output Summary (Last 24 hours) at 3/7/2023 0722  Last data filed at 3/7/2023 0400  Gross per 24 hour   Intake 1088 63 ml   Output --   Net 1088 63 ml       Invasive Devices: Invasive Devices     Peripheral Intravenous Line  Duration           Peripheral IV 03/06/23 Left Antecubital <1 day    Peripheral IV 03/06/23 Right Forearm <1 day                Physical Exam  Constitutional:       Appearance: Normal appearance  Comments: Anxious, tearful   HENT:      Head: Normocephalic and atraumatic  Eyes:      Extraocular Movements: Extraocular movements intact  Conjunctiva/sclera: Conjunctivae normal       Pupils: Pupils are equal, round, and reactive to light  Cardiovascular:      Rate and Rhythm: Normal rate and regular rhythm     Pulmonary:      Effort: Pulmonary effort is normal    Abdominal:      General: There is no distension  Musculoskeletal:         General: Normal range of motion  Skin:     General: Skin is warm and dry  Neurological:      Mental Status: She is alert and oriented to person, place, and time  Coordination: Finger-Nose-Finger Test and Heel to Negron Barters Test normal       Deep Tendon Reflexes:      Reflex Scores:       Bicep reflexes are 2+ on the right side and 2+ on the left side  Brachioradialis reflexes are 2+ on the right side and 2+ on the left side  Patellar reflexes are 2+ on the right side and 2+ on the left side  Achilles reflexes are 2+ on the right side and 2+ on the left side  Psychiatric:         Speech: Speech normal        Neurologic Exam     Mental Status   Oriented to person, place, and time  Follows 2 step commands  Attention: normal  Concentration: normal    Speech: speech is normal   Normal comprehension  Cranial Nerves     CN III, IV, VI   Pupils are equal, round, and reactive to light  R homonymous hemianopsia/visual field cut; otherwise normal CN exam       Motor Exam   Full strength throughout UE and LE  Sensory Exam   Light touch normal      Gait, Coordination, and Reflexes     Coordination   Finger to nose coordination: normal  Heel to shin coordination: normal    Tremor   Resting tremor: absent  Intention tremor: absent    Reflexes   Right brachioradialis: 2+  Left brachioradialis: 2+  Right biceps: 2+  Left biceps: 2+  Right patellar: 2+  Left patellar: 2+  Right achilles: 2+  Left achilles: 2+  Gait deferred as patient being prepped for echo         Lab Results:   CBC:   Results from last 7 days   Lab Units 03/07/23  0426 03/06/23  1830   WBC Thousand/uL 11 05* 11 26*   RBC Million/uL 4 37 4 72   HEMOGLOBIN g/dL 12 8 13 5   HEMATOCRIT % 38 4 40 6   MCV fL 88 86   PLATELETS Thousands/uL 324 362   , BMP/CMP:   Results from last 7 days   Lab Units 03/07/23  0426 03/06/23  1830 SODIUM mmol/L 138 137   POTASSIUM mmol/L 3 8 3 8   CHLORIDE mmol/L 107 105   CO2 mmol/L 23 22   BUN mg/dL 10 12   CREATININE mg/dL 0 54* 0 62   CALCIUM mg/dL 8 7 9 3   AST U/L  --  21   ALT U/L  --  12   ALK PHOS U/L  --  72   EGFR ml/min/1 73sq m 97 93   , Vitamin B12:   , HgBA1C:   Results from last 7 days   Lab Units 03/06/23  1830   HEMOGLOBIN A1C % 5 6   , TSH:   Results from last 7 days   Lab Units 03/06/23  1830   TSH 3RD GENERATON uIU/mL 2 362   , Coagulation:   Results from last 7 days   Lab Units 03/06/23  1830   INR  0 99   , Lipid Profile:   Results from last 7 days   Lab Units 03/06/23  2232   HDL mg/dL 54   LDL CALC mg/dL 158*   TRIGLYCERIDES mg/dL 73   , Ammonia:   , Urinalysis:       Invalid input(s): URIBILINOGEN, Drug Screen:   , Medication Drug Levels:       Invalid input(s): CARBAMAZEPINE,  PHENOBARB, LACOSAMIDE, OXCARBAZEPINE  Imaging Studies: I have personally reviewed pertinent films in PACS   CTA head and neck w wo contrast   Final Result by Hilaria Ramires DO (03/06 6019)      Occlusion distal P2 segment of the left posterior cerebral artery  Increased size of low-attenuation region in the left occipital lobe, consistent with evolving acute infarction  Recommend MRI when clinically appropriate for complete evaluation  No acute intracranial hemorrhage  I personally discussed this study with Mery Echavarria on 3/6/2023 at 11:58 PM                Workstation performed: [id number]         XR chest 1 view portable    (Results Pending)   MRI brain wo contrast    (Results Pending)       EKG, Pathology, and Other Studies: I have personally reviewed pertinent reports  VTE Prophylaxis: Sequential compression device (Venodyne)  and Enoxaparin (Lovenox)    Code Status: Level 1 - Full Code      Total time spent today 50 minutes

## 2023-03-07 NOTE — ASSESSMENT & PLAN NOTE
80year old female with remote history of migraines, presenting on 3/5 (and again yesterda), after experiencing few days ofa typical headache and vision disturbances (patient non-specific in describing vision changes, but suggests visual field distortion on her R side  Neuroimaging reveals presence of L occipital/PCA territory infarct with L P2/PCA occlusion from vascular standpoint  Likely cardio-embolic in the setting of new onset a-fib/a-flutter  Neuro exam with R homonymous hemianopsia, otherwise intact  Plan:  -stroke workup initiated:  -CT head on 3/5 with L occipital hypoattenuation  -CTA head/neck with L P2/PCA occlusion  -MRI brain with/without contrast performed this morning; read pending but reveals L PCA territory infarction  -2D echo pending  -placed on aspirin; will discuss timing of initiating oral antcoagulation today with attending (likely 14 days post stroke given moderate stroke burden size); that date would be approximately March 18th  -continue statin  -hemoglobin A1C of 5 6, lipid panel with LDL of 158  -neuro checks  -telemetry monitoring  -stroke education to be provided  -therapy evaluations  -discussed with patient preliminarily this morning; at this juncture patient is unsafe to drive given R visual field cut; will need to have future ophthalmologic evaluations/fitness to drive testing before being deemed safe to drive again  Will submit [employer] form today

## 2023-03-07 NOTE — ASSESSMENT & PLAN NOTE
Reports long history of migraines into her childhood  Worsened with recent stressors  Typically comes with visual auras, sometimes speech problems if particularly severe   Current headache is improving isolated to L side of head without neurologic changes    Plan:  • Migraine precautions, dim lights, silence sounds if necessary  • Analgesia PRN

## 2023-03-07 NOTE — ASSESSMENT & PLAN NOTE
New onset aflutter/AF, no known history of same, Holter in 2019 unfortunately I cannot access monitor report at the moment  Denies chest pain, palpitations  Admit VS/Labs: afebrile, HR 150s rates under control s/p cardizem, normotensive  BMP unremarkable, CBC mild leukocytosis, troponin negative, , TSH wnl  Imaging:   • EKG: initial aflutter/AF HR 150s, telemetry reviewed with AF rates 100s   • CXR: clear without infiltrate, pulmonary edema, pneumothorax noted await rads read     Plan:   • Admit to medicine SD2 for titratable cardizem w/ telemetry  • AF risks include age, smoking   CHADSvAsc: 2   • F/u troponin, obtain TTE   • Rate control: Cardizem gtt, titrate as able, transition to PO  • Anticoagulation: began discussion with patient regarding anticoagulation upon discharge, pending MRI brain r/o hemorrhage

## 2023-03-08 ENCOUNTER — APPOINTMENT (INPATIENT)
Dept: CT IMAGING | Facility: HOSPITAL | Age: 68
End: 2023-03-08

## 2023-03-08 LAB
ATRIAL RATE: 192 BPM
QRS AXIS: -2 DEGREES
QRSD INTERVAL: 86 MS
QT INTERVAL: 318 MS
QTC INTERVAL: 485 MS
T WAVE AXIS: 12 DEGREES
VENTRICULAR RATE: 140 BPM

## 2023-03-08 RX ORDER — DILTIAZEM HYDROCHLORIDE 5 MG/ML
15 INJECTION INTRAVENOUS ONCE
Status: COMPLETED | OUTPATIENT
Start: 2023-03-08 | End: 2023-03-08

## 2023-03-08 RX ADMIN — IOHEXOL 100 ML: 350 INJECTION, SOLUTION INTRAVENOUS at 11:28

## 2023-03-08 RX ADMIN — DILTIAZEM HYDROCHLORIDE 15 MG: 5 INJECTION INTRAVENOUS at 08:40

## 2023-03-08 RX ADMIN — METOPROLOL SUCCINATE 25 MG: 25 TABLET, EXTENDED RELEASE ORAL at 08:01

## 2023-03-08 RX ADMIN — ASPIRIN 81 MG: 81 TABLET, COATED ORAL at 08:01

## 2023-03-08 RX ADMIN — ATORVASTATIN CALCIUM 40 MG: 40 TABLET, FILM COATED ORAL at 18:06

## 2023-03-08 RX ADMIN — DILTIAZEM HYDROCHLORIDE 5 MG/HR: 5 INJECTION INTRAVENOUS at 09:46

## 2023-03-08 RX ADMIN — DILTIAZEM HYDROCHLORIDE 30 MG: 30 TABLET, FILM COATED ORAL at 18:06

## 2023-03-08 RX ADMIN — DILTIAZEM HYDROCHLORIDE 30 MG: 30 TABLET, FILM COATED ORAL at 11:43

## 2023-03-08 RX ADMIN — ENOXAPARIN SODIUM 40 MG: 100 INJECTION SUBCUTANEOUS at 08:01

## 2023-03-08 RX ADMIN — METOROPROLOL TARTRATE 5 MG: 5 INJECTION, SOLUTION INTRAVENOUS at 06:39

## 2023-03-08 RX ADMIN — Medication 1 PATCH: at 08:01

## 2023-03-08 RX ADMIN — METOROPROLOL TARTRATE 5 MG: 5 INJECTION, SOLUTION INTRAVENOUS at 00:40

## 2023-03-08 NOTE — ASSESSMENT & PLAN NOTE
· Atrial flutter with acute strokes  · Due to large size of strokes and risk for hemorrhagic conversion, anticoagulation should not be started yet    · Per discussion with neurology, okay to start aspirin 81 mg daily  · Seen and evaluated by cardiology  · Will need followup with cardiology in 2 weeks as the plan is to start anticoagulation then   · Echocardiogram with normal EF 54%, R&L atrium with mild dilation, mild tricuspid regurg  · Currently with uncontrolled A-fib rates  · Continued on metoprolol succinate 25 mg twice daily  · Currently on Cardizem drip and Cardizem 30 mg every 6 hours

## 2023-03-08 NOTE — PROGRESS NOTES
[address] [place] [address]  Progress Note Daryl Miranda 1955, 80 y o  female MRN: [id number]  Unit/Bed#: E4 -01 Encounter: [id number]  Primary Care Provider: No primary care provider on file  Date and time admitted to hospital: 3/6/2023  5:51 PM    * Cerebrovascular accident (CVA) due to embolism of cerebral artery [facility])  Assessment & Plan  • Multiple strokes on MRI brain, likely cardio embolic in the setting of new onset atrial fibrillation   • Per discussion with Dr Danay Esquivel, due to longstanding history of smoking, check CT of chest abdomen and pelvis rule out underlying malignancy with hypercoagulable state  • CT chest abdomen pelvis pending  • Plan to continue aspirin 81 mg daily and switch to anticoagulation in 2 weeks due to atrial fibrillation  • Due to size of the strokes, would not start anticoagulation Now  • Plan to start Eliquis 5 mg twice daily on 3/19/2023 and stop aspirin at that time  • Continue the rest of the stroke pathway  • Allow permissive hypertension, avoid hypotension  • Patient advised she can not longer drive at this time given R visual field cut  • Will need ophthalmology evaluation/fitness to drive test before considered safe to resume  • Neurology submitted [employer] form 3/7/2023  • Needs followup in neurology clinic     New onset a-fib [facility])  Assessment & Plan  · Atrial flutter with acute strokes  · Due to large size of strokes and risk for hemorrhagic conversion, anticoagulation should not be started yet    · Per discussion with neurology, okay to start aspirin 81 mg daily  · Seen and evaluated by cardiology  · Will need followup with cardiology in 2 weeks as the plan is to start anticoagulation then   · Echocardiogram with normal EF 54%, R&L atrium with mild dilation, mild tricuspid regurg  · Currently with uncontrolled A-fib rates  · Continued on metoprolol succinate 25 mg twice daily  · Currently on Cardizem drip and Cardizem 30 mg every 6 hours    Migraine with aura and without status migrainosus, not intractable  Assessment & Plan  · Reports long history of migraines into her childhood  · Worsened with recent stressors  · Typically comes with visual auras, sometimes speech problems if particularly severe  · Current headache is improving   • Migraine precautions, dim lights, silence sounds if necessary  • Analgesia PRN    Tobacco abuse  Assessment & Plan  · Current smoker  · Continue nicotine patch    Hypercholesterolemia  Assessment & Plan  · Cholesterol 227, triglycerides 73, HDL 54,   · Started on statin here-Lipitor 40 mg daily      VTE Pharmacologic Prophylaxis:   Pharmacologic: Enoxaparin (Lovenox)  Mechanical VTE Prophylaxis in Place: Yes    Discharge Plan: With need for continued inpatient stay for rate control of A-fib    Discussions with Specialists or Other Care Team Provider: nursing    Education and Discussions with Family / Patient: Patient, daughter via telephone-updated    Time Spent for Care: 1 hour  More than 50% of total time spent on counseling and coordination of care as described above  Current Length of Stay: 2 day(s)  Current Patient Status: Inpatient   Code Status: Level 1 - Full Code    Subjective:   Resting in bed  She is sad that she is currently unable to drive  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  Has some trouble with expressing what she wants to say  Objective:     Vitals:   Temp (24hrs), Av 5 °F (36 4 °C), Min:97 °F (36 1 °C), Max:98 8 °F (37 1 °C)    Temp:  [97 °F (36 1 °C)-98 8 °F (37 1 °C)] 98 8 °F (37 1 °C)  HR:  [] 94  Resp:  [12-18] 18  BP: (100-131)/(57-89) 119/76  SpO2:  [95 %-97 %] 97 %  Body mass index is 27 09 kg/m²  Input and Output Summary (last 24 hours):     No intake or output data in the 24 hours ending 23 1237    Physical Exam:     Physical Exam  Nursing note reviewed  Constitutional:       General: She is not in acute distress  Appearance: Normal appearance  She is normal weight  She is not ill-appearing, toxic-appearing or diaphoretic  HENT:      Head: Normocephalic and atraumatic  Eyes:      General: No scleral icterus  Cardiovascular:      Rate and Rhythm: Tachycardia present  Rhythm irregularly irregular  Pulmonary:      Effort: Pulmonary effort is normal  No respiratory distress  Breath sounds: Normal breath sounds  No stridor  No wheezing or rhonchi  Abdominal:      General: Bowel sounds are normal  There is no distension  Palpations: Abdomen is soft  There is no mass  Tenderness: There is no abdominal tenderness  Hernia: No hernia is present  Musculoskeletal:         General: No swelling  Cervical back: Neck supple  Skin:     General: Skin is warm and dry  Neurological:      Mental Status: She is alert and oriented to person, place, and time  Mental status is at baseline  Psychiatric:         Mood and Affect: Mood normal          Additional Data:     Labs:    Results from last 7 days   Lab Units 03/07/23  0426 03/06/23  1830   WBC Thousand/uL 11 05* 11 26*   HEMOGLOBIN g/dL 12 8 13 5   HEMATOCRIT % 38 4 40 6   PLATELETS Thousands/uL 324 362   NEUTROS PCT %  --  67   LYMPHS PCT %  --  24   MONOS PCT %  --  7   EOS PCT %  --  1     Results from last 7 days   Lab Units 03/07/23  0426 03/06/23  1830   POTASSIUM mmol/L 3 8 3 8   CHLORIDE mmol/L 107 105   CO2 mmol/L 23 22   BUN mg/dL 10 12   CREATININE mg/dL 0 54* 0 62   CALCIUM mg/dL 8 7 9 3   ALK PHOS U/L  --  72   ALT U/L  --  12   AST U/L  --  21     Results from last 7 days   Lab Units 03/06/23  1830   INR  0 99       * I Have Reviewed All Lab Data Listed Above  * Additional Pertinent Lab Tests Reviewed:  J[facility] Admission Reviewed    Imaging:    Imaging Reports Reviewed Today Include: MRI brain  Imaging Personally Reviewed by Myself Includes:      Recent Cultures (last 7 days):           Last 24 Hours Medication List: Current Facility-Administered Medications   Medication Dose Route Frequency Provider Last Rate   • acetaminophen  650 mg Oral Q6H PRN Danielle Hernandez PA-C     • aluminum-magnesium hydroxide-simethicone  30 mL Oral Q6H PRN Baylee Monroe PA-C     • aspirin  81 mg Oral Daily Haresh Jones MD     • atorvastatin  40 mg Oral Daily With Eileen Hernandez PA-C     • diltiazem  1-15 mg/hr Intravenous Titrated Carlos Molina MD 7 5 mg/hr (03/08/23 1139)   • diltiazem  30 mg Oral Q6H [address] Carlos Molina MD     • enoxaparin  40 mg Subcutaneous Daily Danielle Hernnadez PA-C     • hydrOXYzine HCL  25 mg Oral Q6H PRN Danielle Hernandez PA-C     • melatonin  3 mg Oral HS PRN Danielle Hernandez PA-C     • metoprolol  5 mg Intravenous Q4H PRN Danielle Hernandez PA-C     • metoprolol succinate  25 mg Oral Q12H [address] Danielle Hernandez PA-C     • nicotine  1 patch Transdermal Daily Danielle Hernandez PA-C     • ondansetron  4 mg Intravenous Q6H PRN Danielle Hernandez PA-C     • polyethylene glycol  17 g Oral Daily Danielle Chaudhary PA-C          Today, Patient Was Seen By: Harsha Gordon PA-C    ** Please Note: This note has been constructed using a voice recognition system   **

## 2023-03-08 NOTE — ASSESSMENT & PLAN NOTE
· Reports long history of migraines into her childhood  · Worsened with recent stressors  · Typically comes with visual auras, sometimes speech problems if particularly severe     · Current headache is improving   • Migraine precautions, dim lights, silence sounds if necessary  • Analgesia PRN

## 2023-03-08 NOTE — PROGRESS NOTES
Cardiology Progress Note - Codie Nguyen 80 y o  female MRN: [id number]    Unit/Bed#: E4 -01 Encounter: [id number]      Assessment & Plan:    Cerebrovascular accident (CVA) due to [facility]  -CTA head and neck 3/6/2023 showed occlusion of distal P2 segment of the left posterior cerebral artery  - Brain MRI 3/7/2023 showed large acute/subacute left PCA territory infarct with associated edema without significant mass effect or hemorrhagic transformation 2 tiny acute/subacute cortical infarcts in the posterior left frontal lobe  -Neurology following  - Appears to be cardioembolic in the setting of new onset atrial fibrillation  -On aspirin 81 mg daily and atorvastatin 40 mg daily    New onset a-fib ([facility] )  -Plan to start Eliquis 5 mg twice daily 2 weeks post stroke on 3/19/2023, will discontinue aspirin at that time  -Rate control with Toprol-XL 25 mg twice daily and Cardizem drip  -TTE 3/7/2023 showed EF 54%, mildly dilated RV, mild LA and RA, mild TR    Hypercholesterolemia  - Continue with atorvastatin 40 mg daily    Tobacco abuse  - Encourage smoking cessation    Migraine with aura and without status migrainosus, not intractable    Summary:  - A-fib rates uncontrolled this morning  - Continue with Toprol-XL 25 mg twice daily  - Gave Cardizem 15 mg IV this morning and restarted Cardizem drip with improvement of her rates  - Start Cardizem 30 mg p o  every 6 hours and attempt to wean off Cardizem drip  -Per neurology, plan to start Eliquis 5 mg twice daily on 3/19 and stop aspirin at that time       Subjective:   No significant events overnight  She reports having a mild headache this morning, otherwise feels okay  Denies chest pain, shortness of breath, orthopnea, abdominal pain, nausea, vomiting, fever, chills, dizziness or palpitations      Objective:     Vitals: Blood pressure 123/77, pulse (!) 117, temperature (!) 97 °F (36 1 °C), temperature source Temporal, resp  rate 17, height 5' 4" (1 626 m), weight 71 6 kg (157 lb 13 6 oz), SpO2 96 %  , Body mass index is 27 09 kg/m² ,   Orthostatic Blood Pressures    Flowsheet Row Most Recent Value   Blood Pressure 123/77 filed at 03/08/2023 0804   Patient Position - Orthostatic VS Lying filed at 03/08/2023 2142          No intake or output data in the 24 hours ending 03/08/23 6097        Physical Exam:    GEN: [facility] appears well, alert and oriented x 3, pleasant and cooperative   HEENT: anicteric, mucous membranes moist  NECK: no jvd, carotid bruits   HEART: Tachycardic, irregularly irregular, no significant audible murmurs  LUNGS: clear to auscultation bilaterally; no wheezes, rales, or rhonchi   ABDOMEN: normal bowel sounds, soft, no tenderness, no distention  EXTREMITIES: peripheral pulses normal; no clubbing, cyanosis, or edema  NEURO: no focal findings   SKIN: normal without suspicious lesions on exposed skin      Current Facility-Administered Medications:   •  acetaminophen (TYLENOL) tablet 650 mg, 650 mg, Oral, Q6H PRN, Danielle Hernandez PA-C  •  aluminum-magnesium hydroxide-simethicone (MYLANTA) oral suspension 30 mL, 30 mL, Oral, Q6H PRN, Danielle Hernandez PA-C  •  aspirin (ECOTRIN LOW STRENGTH) EC tablet 81 mg, 81 mg, Oral, Daily, Eber Sandoval MD, 81 mg at 03/08/23 0801  •  atorvastatin (LIPITOR) tablet 40 mg, 40 mg, Oral, Daily With Dinner, Loy Carson PA-C  •  diltiazem (CARDIZEM) 125 mg in sodium chloride 0 9 % 125 mL infusion, 1-15 mg/hr, Intravenous, Titrated, Sharon Siddiqi MD, Last Rate: 5 mL/hr at 03/08/23 0946, 5 mg/hr at 03/08/23 0946  •  enoxaparin (LOVENOX) subcutaneous injection 40 mg, 40 mg, Subcutaneous, Daily, Danielle Hernandez PA-C, 40 mg at 03/08/23 0801  •  hydrOXYzine HCL (ATARAX) tablet 25 mg, 25 mg, Oral, Q6H PRN, Danielle Hernandez PA-C  •  melatonin tablet 3 mg, 3 mg, Oral, HS PRN, Danielle Hernandez PA-C  •  metoprolol (LOPRESSOR) injection 5 mg, 5 mg, Intravenous, Q4H PRN, Danielle Hernandez PA-C, 5 mg at 03/08/23 5225  •  metoprolol succinate (TOPROL-XL) 24 hr tablet 25 mg, 25 mg, Oral, Q12H CARISA, Danielle Hernandez PA-C, 25 mg at 03/08/23 0801  •  nicotine (NICODERM CQ) 21 mg/24 hr TD 24 hr patch 1 patch, 1 patch, Transdermal, Daily, Danielle Hernandez PA-C, 1 patch at 03/08/23 0801  •  ondansetron (ZOFRAN) injection 4 mg, 4 mg, Intravenous, Q6H PRN, Danielle Hernandez PA-C  •  polyethylene glycol (MIRALAX) packet 17 g, 17 g, Oral, Daily, Danielle Hernandez PA-C    Labs & Results:    No results found for: CKTOTAL, CKMB, CKMBINDEX, TROPONINI    Lab Results   Component Value Date    CALCIUM 8 7 03/07/2023    K 3 8 03/07/2023    CO2 23 03/07/2023     03/07/2023    BUN 10 03/07/2023    CREATININE 0 54 (L) 03/07/2023       Lab Results   Component Value Date    WBC 11 05 (H) 03/07/2023    HGB 12 8 03/07/2023    HCT 38 4 03/07/2023    MCV 88 03/07/2023     03/07/2023     Results from last 7 days   Lab Units 03/06/23  1830   INR  0 99       No results found for: CHOL  Lab Results   Component Value Date    HDL 54 03/06/2023    HDL 65 04/26/2019     Lab Results   Component Value Date    LDLCALC 158 (H) 03/06/2023    LDLCALC 184 (H) 04/26/2019     Lab Results   Component Value Date    TRIG 73 03/06/2023    TRIG 85 04/26/2019       Lab Results   Component Value Date    ALT 12 03/06/2023    AST 21 03/06/2023         EKG personally reviewed by )Boris Weston MD  No acute changes   TELE: A-fib with RVR, otherwise no significant arrhythmias

## 2023-03-08 NOTE — ASSESSMENT & PLAN NOTE
• Multiple strokes on MRI brain, likely cardio embolic in the setting of new onset atrial fibrillation   • Per discussion with Dr Lori Burrows, due to longstanding history of smoking, check CT of chest abdomen and pelvis rule out underlying malignancy with hypercoagulable state  • CT chest abdomen pelvis pending  • Plan to continue aspirin 81 mg daily and switch to anticoagulation in 2 weeks due to atrial fibrillation  • Due to size of the strokes, would not start anticoagulation Now  • Plan to start Eliquis 5 mg twice daily on 3/19/2023 and stop aspirin at that time  • Continue the rest of the stroke pathway  • Allow permissive hypertension, avoid hypotension  • Patient advised she can not longer drive at this time given R visual field cut  • Will need ophthalmology evaluation/fitness to drive test before considered safe to resume  • Neurology submitted [employer] form 3/7/2023  • Needs followup in neurology clinic

## 2023-03-08 NOTE — PLAN OF CARE
Problem: Potential for Falls  Goal: Patient will remain free of falls  Description: INTERVENTIONS:  - Educate patient/family on patient safety including physical limitations  - Instruct patient to call for assistance with activity   - Consult OT/PT to assist with strengthening/mobility   - Keep Call bell within reach  - Keep bed low and locked with side rails adjusted as appropriate  - Keep care items and personal belongings within reach  - Initiate and maintain comfort rounds  - Make Fall Risk Sign visible to staff  - Offer Toileting every 2 Hours, in advance of need  - Initiate/Maintain bed alarm  - Obtain necessary fall risk management equipment: alarms  - Apply yellow socks and bracelet for high fall risk patients  - Consider moving patient to room near nurses station  Outcome: Progressing     Problem: PAIN - ADULT  Goal: Verbalizes/displays adequate comfort level or baseline comfort level  Description: Interventions:  - Encourage patient to monitor pain and request assistance  - Assess pain using appropriate pain scale  - Administer analgesics based on type and severity of pain and evaluate response  - Implement non-pharmacological measures as appropriate and evaluate response  - Consider cultural and social influences on pain and pain management  - Notify physician/advanced practitioner if interventions unsuccessful or patient reports new pain  Outcome: Progressing     Problem: INFECTION - ADULT  Goal: Absence or prevention of progression during hospitalization  Description: INTERVENTIONS:  - Assess and monitor for signs and symptoms of infection  - Monitor lab/diagnostic results  - Monitor all insertion sites, i e  indwelling lines, tubes, and drains  - Monitor endotracheal if appropriate and nasal secretions for changes in amount and color  - [place] appropriate cooling/warming therapies per order  - Administer medications as ordered  - Instruct and encourage patient and family to use good hand hygiene technique  - Identify and instruct in appropriate isolation precautions for identified infection/condition  Outcome: Progressing  Goal: Absence of fever/infection during neutropenic period  Description: INTERVENTIONS:  - Monitor WBC    Outcome: Progressing     Problem: SAFETY ADULT  Goal: Patient will remain free of falls  Description: INTERVENTIONS:  - Educate patient/family on patient safety including physical limitations  - Instruct patient to call for assistance with activity   - Consult OT/PT to assist with strengthening/mobility   - Keep Call bell within reach  - Keep bed low and locked with side rails adjusted as appropriate  - Keep care items and personal belongings within reach  - Initiate and maintain comfort rounds  - Make Fall Risk Sign visible to staff  - Offer Toileting every 2 Hours, in advance of need  - Initiate/Maintain bed alarm  - Obtain necessary fall risk management equipment: alarms  - Apply yellow socks and bracelet for high fall risk patients  - Consider moving patient to room near nurses station  Outcome: Progressing  Goal: Maintain or return to baseline ADL function  Description: INTERVENTIONS:  -  Assess patient's ability to carry out ADLs; assess patient's baseline for ADL function and identify physical deficits which impact ability to perform ADLs (bathing, care of mouth/teeth, toileting, grooming, dressing, etc )  - Assess/evaluate cause of self-care deficits   - Assess range of motion  - Assess patient's mobility; develop plan if impaired  - Assess patient's need for assistive devices and provide as appropriate  - Encourage maximum independence but intervene and supervise when necessary  - Involve family in performance of ADLs  - Assess for home care needs following discharge   - Consider OT consult to assist with ADL evaluation and planning for discharge  - Provide patient education as appropriate  Outcome: Progressing  Goal: Maintains/Returns to pre admission functional level  Description: INTERVENTIONS:  - Perform BMAT or MOVE assessment daily    - Set and communicate daily mobility goal to care team and patient/family/caregiver  - Collaborate with rehabilitation services on mobility goals if consulted  - Perform Range of Motion 3 times a day  - Reposition patient every 2 hours  - Dangle patient 3 times a day  - Stand patient 3 times a day  - Ambulate patient 3 times a day  - Out of bed to chair 3 times a day   - Out of bed for meals 3 times a day  - Out of bed for toileting  - Record patient progress and toleration of activity level   Outcome: Progressing     Problem: DISCHARGE PLANNING  Goal: Discharge to home or other facility with appropriate resources  Description: INTERVENTIONS:  - Identify barriers to discharge w/patient and caregiver  - Arrange for needed discharge resources and transportation as appropriate  - Identify discharge learning needs (meds, wound care, etc )  - Arrange for interpretive services to assist at discharge as needed  - Refer to Case Management Department for coordinating discharge planning if the patient needs post-hospital services based on physician/advanced practitioner order or complex needs related to functional status, cognitive ability, or social support system  Outcome: Progressing     Problem: Knowledge Deficit  Goal: Patient/family/caregiver demonstrates understanding of disease process, treatment plan, medications, and discharge instructions  Description: Complete learning assessment and assess knowledge base  Interventions:  - Provide teaching at level of understanding  - Provide teaching via preferred learning methods  Outcome: Progressing     Problem: Neurological Deficit  Goal: Neurological status is stable or improving  Description: Interventions:  - Monitor and assess patient's level of consciousness, motor function, sensory function, and level of assistance needed for ADLs  - Monitor and report changes from baseline  Collaborate with interdisciplinary team to initiate plan and implement interventions as ordered  - Provide and maintain a safe environment  - Consider seizure precautions  - Consider fall precautions  - Consider aspiration precautions  - Consider bleeding precautions  Outcome: Progressing     Problem: Activity Intolerance/Impaired Mobility  Goal: Mobility/activity is maintained at optimum level for patient  Description: Interventions:  - Assess and monitor patient  barriers to mobility and need for assistive/adaptive devices  - Assess patient's emotional response to limitations  - Collaborate with interdisciplinary team and initiate plans and interventions as ordered  - Encourage independent activity per ability   - Maintain proper body alignment  - Perform active/passive rom as tolerated/ordered  - Plan activities to conserve energy   - Turn patient as appropriate  Outcome: Progressing     Problem: Communication Impairment  Goal: Ability to express needs and understand communication  Description: Assess patient's communication skills and ability to understand information  Patient will demonstrate use of effective communication techniques, alternative methods of communication and understanding even if not able to speak  - Encourage communication and provide alternate methods of communication as needed  - Collaborate with case management/ for discharge needs  - Include patient/family/caregiver in decisions related to communication  Outcome: Progressing     Problem: Potential for Aspiration  Goal: Non-ventilated patient's risk of aspiration is minimized  Description: Assess and monitor vital signs, respiratory status, and labs (WBC)  Monitor for signs of aspiration (tachypnea, cough, rales, wheezing, cyanosis, fever)  - Assess and monitor patient's ability to swallow  - Place patient up in chair to eat if possible    - HOB up at 90 degrees to eat if unable to get patient up into chair   - Supervise patient during oral intake  - Instruct patient/ family to take small bites  - Instruct patient/ family to take small single sips when taking liquids  - Follow patient-specific strategies generated by speech pathologist   Outcome: Progressing  Goal: Ventilated patient's risk of aspiration is minimized  Description: Assess and monitor vital signs, respiratory status, airway cuff pressure, and labs (WBC)  Monitor for signs of aspiration (tachypnea, cough, rales, wheezing, cyanosis, fever)  - Elevate head of bed 30 degrees if patient has tube feeding   - Monitor tube feeding  Outcome: Progressing     Problem: Nutrition  Goal: Nutrition/Hydration status is improving  Description: Monitor and assess patient's nutrition/hydration status for malnutrition (ex- brittle hair, bruises, dry skin, pale skin and conjunctiva, muscle wasting, smooth red tongue, and disorientation)  Collaborate with interdisciplinary team and initiate plan and interventions as ordered  Monitor patient's weight and dietary intake as ordered or per policy  Utilize nutrition screening tool and intervene per policy  Determine patient's food preferences and provide high-protein, high-caloric foods as appropriate  - Assist patient with eating   - Allow adequate time for meals   - Encourage patient to take dietary supplement as ordered  - Collaborate with clinical nutritionist   - Include patient/family/caregiver in decisions related to nutrition    Outcome: Progressing     Problem: NEUROSENSORY - ADULT  Goal: Achieves stable or improved neurological status  Description: INTERVENTIONS  - Monitor and report changes in neurological status  - Monitor vital signs such as temperature, blood pressure, glucose, and any other labs ordered   - Initiate measures to prevent increased intracranial pressure  - Monitor for seizure activity and implement precautions if appropriate      Outcome: Progressing  Goal: Remains free of injury related to seizures activity  Description: INTERVENTIONS  - Maintain airway, patient safety  and administer oxygen as ordered  - Monitor patient for seizure activity, document and report duration and description of seizure to physician/advanced practitioner  - If seizure occurs,  ensure patient safety during seizure  - Reorient patient post seizure  - Seizure pads on all 4 side rails  - Instruct patient/family to notify RN of any seizure activity including if an aura is experienced  - Instruct patient/family to call for assistance with activity based on nursing assessment  - Administer anti-seizure medications if ordered    Outcome: Progressing  Goal: Achieves maximal functionality and self care  Description: INTERVENTIONS  - Monitor swallowing and airway patency with patient fatigue and changes in neurological status  - Encourage and assist patient to increase activity and self care     - Encourage visually impaired, hearing impaired and aphasic patients to use assistive/communication devices  Outcome: Progressing     Problem: CARDIOVASCULAR - ADULT  Goal: Maintains optimal cardiac output and hemodynamic stability  Description: INTERVENTIONS:  - Monitor I/O, vital signs and rhythm  - Monitor for S/S and trends of decreased cardiac output  - Administer and titrate ordered vasoactive medications to optimize hemodynamic stability  - Assess quality of pulses, skin color and temperature  - Assess for signs of decreased coronary artery perfusion  - Instruct patient to report change in severity of symptoms  Outcome: Progressing  Goal: Absence of cardiac dysrhythmias or at baseline rhythm  Description: INTERVENTIONS:  - Continuous cardiac monitoring, vital signs, obtain 12 lead EKG if ordered  - Administer antiarrhythmic and heart rate control medications as ordered  - Monitor electrolytes and administer replacement therapy as ordered  Outcome: Progressing

## 2023-03-09 ENCOUNTER — APPOINTMENT (INPATIENT)
Dept: MRI IMAGING | Facility: HOSPITAL | Age: 68
End: 2023-03-09

## 2023-03-09 PROBLEM — N28.9: Status: ACTIVE | Noted: 2023-03-09

## 2023-03-09 RX ORDER — DIGOXIN 0.25 MG/ML
250 INJECTION INTRAMUSCULAR; INTRAVENOUS EVERY 6 HOURS
Status: COMPLETED | OUTPATIENT
Start: 2023-03-09 | End: 2023-03-09

## 2023-03-09 RX ADMIN — DILTIAZEM HYDROCHLORIDE 30 MG: 30 TABLET, FILM COATED ORAL at 00:06

## 2023-03-09 RX ADMIN — ATORVASTATIN CALCIUM 40 MG: 40 TABLET, FILM COATED ORAL at 16:47

## 2023-03-09 RX ADMIN — DILTIAZEM HYDROCHLORIDE 5 MG/HR: 5 INJECTION INTRAVENOUS at 13:09

## 2023-03-09 RX ADMIN — DIGOXIN 250 MCG: 0.25 INJECTION INTRAMUSCULAR; INTRAVENOUS at 16:47

## 2023-03-09 RX ADMIN — Medication 3 MG: at 22:30

## 2023-03-09 RX ADMIN — DILTIAZEM HYDROCHLORIDE 30 MG: 30 TABLET, FILM COATED ORAL at 06:11

## 2023-03-09 RX ADMIN — DIGOXIN 250 MCG: 0.25 INJECTION INTRAMUSCULAR; INTRAVENOUS at 10:25

## 2023-03-09 RX ADMIN — METOPROLOL SUCCINATE 25 MG: 25 TABLET, EXTENDED RELEASE ORAL at 22:28

## 2023-03-09 RX ADMIN — ENOXAPARIN SODIUM 40 MG: 100 INJECTION SUBCUTANEOUS at 08:27

## 2023-03-09 RX ADMIN — DILTIAZEM HYDROCHLORIDE 7.5 MG/HR: 5 INJECTION INTRAVENOUS at 02:18

## 2023-03-09 RX ADMIN — ASPIRIN 81 MG: 81 TABLET, COATED ORAL at 08:27

## 2023-03-09 RX ADMIN — DIGOXIN 250 MCG: 0.25 INJECTION INTRAMUSCULAR; INTRAVENOUS at 22:29

## 2023-03-09 RX ADMIN — DILTIAZEM HYDROCHLORIDE 30 MG: 30 TABLET, FILM COATED ORAL at 12:13

## 2023-03-09 RX ADMIN — DILTIAZEM HYDROCHLORIDE 30 MG: 30 TABLET, FILM COATED ORAL at 18:01

## 2023-03-09 RX ADMIN — Medication 1 PATCH: at 08:27

## 2023-03-09 NOTE — PROGRESS NOTES
[facility]  Progress Note Xu Payor 1955, 80 y o  female MRN: [id number]  Unit/Bed#: E4 -01 Encounter: [id number]  Primary Care Provider: No primary care provider on file  Date and time admitted to hospital: 3/6/2023  5:51 PM    * Cerebrovascular accident (CVA) due to embolism of cerebral artery [facility])  Assessment & Plan  • Multiple strokes on MRI brain, likely cardio embolic in the setting of new onset atrial fibrillation   • Per discussion with Dr Kathryn Mcneil, due to longstanding history of smoking, check CT of chest abdomen and pelvis rule out underlying malignancy with hypercoagulable state  • CT chest abdomen pelvis with 2 indeterminate lesions on her kidneys -MRI recommended  • Plan to continue aspirin 81 mg daily and switch to anticoagulation in 2 weeks due to atrial fibrillation  • Due to size of the strokes, would not start anticoagulation Now  • Plan to start Eliquis 5 mg twice daily on 3/19/2023 and stop aspirin at that time  • Allow permissive hypertension, avoid hypotension  • Patient advised she can not longer drive at this time given R visual field cut  • Will need ophthalmology evaluation/fitness to drive test before considered safe to resume  • Neurology submitted [employer] form 3/7/2023  • Needs followup in neurology clinic     New onset a-fib [facility])  Assessment & Plan  · Atrial flutter with acute strokes  · Due to large size of strokes and risk for hemorrhagic conversion, anticoagulation should not be started yet    · Per discussion with neurology, okay to start aspirin 81 mg daily  · Seen and evaluated by cardiology  · Will need followup with cardiology in 2 weeks as the plan is to start anticoagulation then   · Echocardiogram with normal EF 54%, R&L atrium with mild dilation, mild tricuspid regurg  · Currently with uncontrolled A-fib rates  · Continued on metoprolol succinate 25 mg twice daily  · Currently on Cardizem drip and Cardizem 30 mg every 6 hours  · Cardiology attempting to wean off Cardizem drip today  · Cardiology has also started digoxin to better control rates without affecting blood pressure  · Started on digoxin 250 mcg every 6 hours for 3 doses  · Hopeful plan to start oral digoxin tomorrow    Lesion of native kidney  Assessment & Plan  · Neurology recommended further CAT scan chest abdomen pelvis to rule out malignancy  · 2 indeterminate lesions measuring 0 9 cm in the right and left kidneys which do not meet criteria for simple cyst   Further recommendation recommended with MRI  · MRI ordered and pending  Migraine with aura and without status migrainosus, not intractable  Assessment & Plan  · Reports long history of migraines into her childhood  · Worsened with recent stressors  · Typically comes with visual auras, sometimes speech problems if particularly severe  · Current headache is improving   • Migraine precautions, dim lights, silence sounds if necessary  • Analgesia PRN    Tobacco abuse  Assessment & Plan  · Current smoker  · Discussed cessation  · Continue nicotine patch    Hypercholesterolemia  Assessment & Plan  · Cholesterol 227, triglycerides 73, HDL 54,   · Started on statin here-Lipitor 40 mg daily        VTE Pharmacologic Prophylaxis:   Pharmacologic: Enoxaparin (Lovenox)  Mechanical VTE Prophylaxis in Place: Yes    Discharge Plan: With need for continued inpatient stay for A-fib with RVR and need for rate control as well as additional work-up of kidney lesions    Discussions with Specialists or Other Care Team Provider: Nursing    Education and Discussions with Family / Patient: Patient, daughter via telephone-updated    Time Spent for Care: 45 minutes  More than 50% of total time spent on counseling and coordination of care as described above  Current Length of Stay: 3 day(s)  Current Patient Status: Inpatient   Code Status: Level 1 - Full Code    Subjective:   Resting in bed    She is very tearful and nervous about being in the hospital   She feels like everything is going wrong  Discussed abnormal findings on CT abdomen  Explained need for MRI    Objective:     Vitals:   Temp (24hrs), Av 3 °F (36 8 °C), Min:97 3 °F (36 3 °C), Max:99 °F (37 2 °C)    Temp:  [97 3 °F (36 3 °C)-99 °F (37 2 °C)] 98 °F (36 7 °C)  HR:  [] 86  Resp:  [16-20] 20  BP: ()/(57-76) 104/70  SpO2:  [95 %-97 %] 95 %  Body mass index is 26 19 kg/m²  Input and Output Summary (last 24 hours):     No intake or output data in the 24 hours ending 23 1111    Physical Exam:     Physical Exam  Vitals and nursing note reviewed  Constitutional:       Appearance: She is obese  HENT:      Head: Normocephalic and atraumatic  Eyes:      General: No scleral icterus  Cardiovascular:      Rate and Rhythm: Tachycardia present  Rhythm irregularly irregular  Pulmonary:      Effort: Pulmonary effort is normal  No respiratory distress  Breath sounds: Normal breath sounds  No stridor  No wheezing or rhonchi  Abdominal:      General: Bowel sounds are normal  There is no distension  Palpations: Abdomen is soft  There is no mass  Tenderness: There is no abdominal tenderness  Hernia: No hernia is present  Musculoskeletal:         General: No swelling  Cervical back: Neck supple  Skin:     General: Skin is warm and dry  Neurological:      Mental Status: She is alert and oriented to person, place, and time  Mental status is at baseline  Psychiatric:         Attention and Perception: Attention and perception normal          Mood and Affect: Mood normal  Affect is labile and tearful           Behavior: Behavior normal          Additional Data:     Labs:    Results from last 7 days   Lab Units 23  0426 23  1830   WBC Thousand/uL 11 05* 11 26*   HEMOGLOBIN g/dL 12 8 13 5   HEMATOCRIT % 38 4 40 6   PLATELETS Thousands/uL 324 362   NEUTROS PCT %  --  67   LYMPHS PCT %  --  24   MONOS PCT %  --  7 EOS PCT %  --  1     Results from last 7 days   Lab Units 03/07/23  0426 03/06/23  1830   POTASSIUM mmol/L 3 8 3 8   CHLORIDE mmol/L 107 105   CO2 mmol/L 23 22   BUN mg/dL 10 12   CREATININE mg/dL 0 54* 0 62   CALCIUM mg/dL 8 7 9 3   ALK PHOS U/L  --  72   ALT U/L  --  12   AST U/L  --  21     Results from last 7 days   Lab Units 03/06/23  1830   INR  0 99       * I Have Reviewed All Lab Data Listed Above  * Additional Pertinent Lab Tests Reviewed:  Jaren 67 Admission Reviewed    Imaging:    Imaging Reports Reviewed Today Include:   Imaging Personally Reviewed by Myself Includes:      Recent Cultures (last 7 days):           Last 24 Hours Medication List:   Current Facility-Administered Medications   Medication Dose Route Frequency Provider Last Rate   • acetaminophen  650 mg Oral Q6H PRN Danielle Hernandez PA-C     • aluminum-magnesium hydroxide-simethicone  30 mL Oral Q6H PRN Danielle Hernandez PA-C     • aspirin  81 mg Oral Daily Roxana Pineda MD     • atorvastatin  40 mg Oral Daily [address]     • digoxin  250 mcg Intravenous Q6H Cherelle Mckeon MD     • diltiazem  1-15 mg/hr Intravenous Titrated Cherelle Mckeon MD 7 5 mg/hr (03/09/23 0218)   • diltiazem  30 mg Oral Q6H [facility] HOME Cherelle Mckeon MD     • enoxaparin  40 mg Subcutaneous Daily Danielle Hernandez PA-C     • hydrOXYzine HCL  25 mg Oral Q6H PRN Danielle Hernandez PA-C     • melatonin  3 mg Oral HS PRN Danielle Hernandez PA-C     • metoprolol  5 mg Intravenous Q4H PRN Danielle Hernandez PA-C     • metoprolol succinate  25 mg Oral Q12H [facility] UZAIR Hernandez PA-C     • nicotine  1 patch Transdermal Daily Danielle Hernandez PA-C     • ondansetron  4 mg Intravenous Q6H PRN Danielle Hernandez PA-C     • polyethylene glycol  17 g Oral Daily Danielle Bravo PA-C          Today, Patient Was Seen By: Merilynn Lundborg, PA-C    ** Please Note: This note has been constructed using a voice recognition system   **

## 2023-03-09 NOTE — PLAN OF CARE
Problem: Potential for Falls  Goal: Patient will remain free of falls  Description: INTERVENTIONS:  - Educate patient/family on patient safety including physical limitations  - Instruct patient to call for assistance with activity   - Consult OT/PT to assist with strengthening/mobility   - Keep Call bell within reach  - Keep bed low and locked with side rails adjusted as appropriate  - Keep care items and personal belongings within reach  - Initiate and maintain comfort rounds  - Make Fall Risk Sign visible to staff  - Offer Toileting every 3 Hours, in advance of need  - Initiate/Maintain bed alarm  - Obtain necessary fall risk management equipment:  - Apply yellow socks and bracelet for high fall risk patients  - Consider moving patient to room near nurses station  Outcome: Progressing     Problem: PAIN - ADULT  Goal: Verbalizes/displays adequate comfort level or baseline comfort level  Description: Interventions:  - Encourage patient to monitor pain and request assistance  - Assess pain using appropriate pain scale  - Administer analgesics based on type and severity of pain and evaluate response  - Implement non-pharmacological measures as appropriate and evaluate response  - Consider cultural and social influences on pain and pain management  - Notify physician/advanced practitioner if interventions unsuccessful or patient reports new pain  Outcome: Progressing     Problem: INFECTION - ADULT  Goal: Absence or prevention of progression during hospitalization  Description: INTERVENTIONS:  - Assess and monitor for signs and symptoms of infection  - Monitor lab/diagnostic results  - Monitor all insertion sites, i e  indwelling lines, tubes, and drains  - Monitor endotracheal if appropriate and nasal secretions for changes in amount and color  - [place] appropriate cooling/warming therapies per order  - Administer medications as ordered  - Instruct and encourage patient and family to use good hand hygiene technique  - Identify and instruct in appropriate isolation precautions for identified infection/condition  Outcome: Progressing  Goal: Absence of fever/infection during neutropenic period  Description: INTERVENTIONS:  - Monitor WBC    Outcome: Progressing     Problem: SAFETY ADULT  Goal: Patient will remain free of falls  Description: INTERVENTIONS:  - Educate patient/family on patient safety including physical limitations  - Instruct patient to call for assistance with activity   - Consult OT/PT to assist with strengthening/mobility   - Keep Call bell within reach  - Keep bed low and locked with side rails adjusted as appropriate  - Keep care items and personal belongings within reach  - Initiate and maintain comfort rounds  - Make Fall Risk Sign visible to staff  - Offer Toileting every 3 Hours, in advance of need  - Initiate/Maintain bed alarm  - Obtain necessary fall risk management equipment  - Apply yellow socks and bracelet for high fall risk patients  - Consider moving patient to room near nurses station  Outcome: Progressing  Goal: Maintain or return to baseline ADL function  Description: INTERVENTIONS:  -  Assess patient's ability to carry out ADLs; assess patient's baseline for ADL function and identify physical deficits which impact ability to perform ADLs (bathing, care of mouth/teeth, toileting, grooming, dressing, etc )  - Assess/evaluate cause of self-care deficits   - Assess range of motion  - Assess patient's mobility; develop plan if impaired  - Assess patient's need for assistive devices and provide as appropriate  - Encourage maximum independence but intervene and supervise when necessary  - Involve family in performance of ADLs  - Assess for home care needs following discharge   - Consider OT consult to assist with ADL evaluation and planning for discharge  - Provide patient education as appropriate  Outcome: Progressing  Goal: Maintains/Returns to pre admission functional level  Description: INTERVENTIONS:  - Perform BMAT or MOVE assessment daily    - Set and communicate daily mobility goal to care team and patient/family/caregiver  - Collaborate with rehabilitation services on mobility goals if consulted  - Perform Range of Motion 3 times a day  - Reposition patient every 2 hours  - Dangle patient 3 times a day  - Stand patient 3 times a day  - Ambulate patient 3 times a day  - Out of bed to chair 3 times a day   - Out of bed for meals 3 times a day  - Out of bed for toileting  - Record patient progress and toleration of activity level   Outcome: Progressing     Problem: DISCHARGE PLANNING  Goal: Discharge to home or other facility with appropriate resources  Description: INTERVENTIONS:  - Identify barriers to discharge w/patient and caregiver  - Arrange for needed discharge resources and transportation as appropriate  - Identify discharge learning needs (meds, wound care, etc )  - Arrange for interpretive services to assist at discharge as needed  - Refer to Case Management Department for coordinating discharge planning if the patient needs post-hospital services based on physician/advanced practitioner order or complex needs related to functional status, cognitive ability, or social support system  Outcome: Progressing     Problem: Knowledge Deficit  Goal: Patient/family/caregiver demonstrates understanding of disease process, treatment plan, medications, and discharge instructions  Description: Complete learning assessment and assess knowledge base  Interventions:  - Provide teaching at level of understanding  - Provide teaching via preferred learning methods  Outcome: Progressing     Problem: Neurological Deficit  Goal: Neurological status is stable or improving  Description: Interventions:  - Monitor and assess patient's level of consciousness, motor function, sensory function, and level of assistance needed for ADLs  - Monitor and report changes from baseline   Collaborate with interdisciplinary team to initiate plan and implement interventions as ordered  - Provide and maintain a safe environment  - Consider seizure precautions  - Consider fall precautions  - Consider aspiration precautions  - Consider bleeding precautions  Outcome: Progressing     Problem: Activity Intolerance/Impaired Mobility  Goal: Mobility/activity is maintained at optimum level for patient  Description: Interventions:  - Assess and monitor patient  barriers to mobility and need for assistive/adaptive devices  - Assess patient's emotional response to limitations  - Collaborate with interdisciplinary team and initiate plans and interventions as ordered  - Encourage independent activity per ability   - Maintain proper body alignment  - Perform active/passive rom as tolerated/ordered  - Plan activities to conserve energy   - Turn patient as appropriate  Outcome: Progressing     Problem: Communication Impairment  Goal: Ability to express needs and understand communication  Description: Assess patient's communication skills and ability to understand information  Patient will demonstrate use of effective communication techniques, alternative methods of communication and understanding even if not able to speak  - Encourage communication and provide alternate methods of communication as needed  - Collaborate with case management/ for discharge needs  - Include patient/family/caregiver in decisions related to communication  Outcome: Progressing     Problem: Potential for Aspiration  Goal: Non-ventilated patient's risk of aspiration is minimized  Description: Assess and monitor vital signs, respiratory status, and labs (WBC)  Monitor for signs of aspiration (tachypnea, cough, rales, wheezing, cyanosis, fever)  - Assess and monitor patient's ability to swallow  - Place patient up in chair to eat if possible    - HOB up at 90 degrees to eat if unable to get patient up into chair   - Supervise patient during oral intake  - Instruct patient/ family to take small bites  - Instruct patient/ family to take small single sips when taking liquids  - Follow patient-specific strategies generated by speech pathologist   Outcome: Progressing  Goal: Ventilated patient's risk of aspiration is minimized  Description: Assess and monitor vital signs, respiratory status, airway cuff pressure, and labs (WBC)  Monitor for signs of aspiration (tachypnea, cough, rales, wheezing, cyanosis, fever)  - Elevate head of bed 30 degrees if patient has tube feeding   - Monitor tube feeding  Outcome: Progressing     Problem: Nutrition  Goal: Nutrition/Hydration status is improving  Description: Monitor and assess patient's nutrition/hydration status for malnutrition (ex- brittle hair, bruises, dry skin, pale skin and conjunctiva, muscle wasting, smooth red tongue, and disorientation)  Collaborate with interdisciplinary team and initiate plan and interventions as ordered  Monitor patient's weight and dietary intake as ordered or per policy  Utilize nutrition screening tool and intervene per policy  Determine patient's food preferences and provide high-protein, high-caloric foods as appropriate  - Assist patient with eating   - Allow adequate time for meals   - Encourage patient to take dietary supplement as ordered  - Collaborate with clinical nutritionist   - Include patient/family/caregiver in decisions related to nutrition    Outcome: Progressing     Problem: NEUROSENSORY - ADULT  Goal: Achieves stable or improved neurological status  Description: INTERVENTIONS  - Monitor and report changes in neurological status  - Monitor vital signs such as temperature, blood pressure, glucose, and any other labs ordered   - Initiate measures to prevent increased intracranial pressure  - Monitor for seizure activity and implement precautions if appropriate      Outcome: Progressing  Goal: Remains free of injury related to seizures activity  Description: INTERVENTIONS  - Maintain airway, patient safety  and administer oxygen as ordered  - Monitor patient for seizure activity, document and report duration and description of seizure to physician/advanced practitioner  - If seizure occurs,  ensure patient safety during seizure  - Reorient patient post seizure  - Seizure pads on all 4 side rails  - Instruct patient/family to notify RN of any seizure activity including if an aura is experienced  - Instruct patient/family to call for assistance with activity based on nursing assessment  - Administer anti-seizure medications if ordered    Outcome: Progressing  Goal: Achieves maximal functionality and self care  Description: INTERVENTIONS  - Monitor swallowing and airway patency with patient fatigue and changes in neurological status  - Encourage and assist patient to increase activity and self care     - Encourage visually impaired, hearing impaired and aphasic patients to use assistive/communication devices  Outcome: Progressing     Problem: CARDIOVASCULAR - ADULT  Goal: Maintains optimal cardiac output and hemodynamic stability  Description: INTERVENTIONS:  - Monitor I/O, vital signs and rhythm  - Monitor for S/S and trends of decreased cardiac output  - Administer and titrate ordered vasoactive medications to optimize hemodynamic stability  - Assess quality of pulses, skin color and temperature  - Assess for signs of decreased coronary artery perfusion  - Instruct patient to report change in severity of symptoms  Outcome: Progressing  Goal: Absence of cardiac dysrhythmias or at baseline rhythm  Description: INTERVENTIONS:  - Continuous cardiac monitoring, vital signs, obtain 12 lead EKG if ordered  - Administer antiarrhythmic and heart rate control medications as ordered  - Monitor electrolytes and administer replacement therapy as ordered  Outcome: Progressing

## 2023-03-09 NOTE — ASSESSMENT & PLAN NOTE
· Neurology recommended further CAT scan chest abdomen pelvis to rule out malignancy  · 2 indeterminate lesions measuring 0 9 cm in the right and left kidneys which do not meet criteria for simple cyst   Further recommendation recommended with MRI  · MRI ordered and pending

## 2023-03-09 NOTE — OCCUPATIONAL THERAPY NOTE
Occupational Therapy Evaluation     Patient Name: Patric Israel  BLQTI'R Date: 3/9/2023  Problem List  Principal Problem:    Cerebrovascular accident (CVA) due to embolism of cerebral artery ([address] )  Active Problems:    Hypercholesterolemia    Tobacco abuse    New onset a-fib (HCC)    Migraine with aura and without status migrainosus, not intractable    Cerebrovascular accident (CVA) due to embolism of left posterior cerebral artery (HCC)    Lesion of native kidney    Past Medical History  Past Medical History:   Diagnosis Date    Migraine     Premature supraventricular beats      Past Surgical History  History reviewed  No pertinent surgical history  03/09/23 0954   OT Last Visit   OT Visit Date 03/09/23   Note Type   Note type Evaluation   Pain Assessment   Pain Assessment Tool 0-10   Pain Score No Pain   Restrictions/Precautions   Weight Bearing Precautions Per Order No   Other Precautions Multiple lines;Telemetry; Fall Risk;Visual impairment  (R visual field impairment)   Home Living   Type of [address] [place] [address] One level;Performs ADLs on one level; Able to live on main level with bedroom/bathroom;Stairs to enter without rails   Bathroom Shower/Tub Tub/shower unit   Bathroom Toilet Standard   Bathroom Equipment   (none)   [address]   (none)   Additional Comments 2 ELANA  Prior Function   Level of [place] Independent with ADLs; Independent with functional mobility; Independent with IADLS   Lives With Daughter   Receives Help From Family   IADLs Independent with driving; Independent with meal prep; Independent with medication management   Falls in the last 6 months 0   Vocational Full time employment   Comments Shares IADLs with dtr  Lifestyle   Autonomy PTA, pt was Independent in ADLs, IADLs, and functional transfers/mobility w/o AD  (+)   (-) falls     Reciprocal Relationships daughter   Service to Others full time employment  for [employer]   General   Family/Caregiver Present Yes   Subjective   Subjective "you are making me nervous"   ADL   Where Assessed Chair   Eating Assistance 6  Modified independent   Grooming Assistance 5  Supervision/Setup   UB Bathing Assistance 5  Supervision/Setup   LB Bathing Assistance 5  Supervision/Setup   UB Dressing Assistance 5  Supervision/Setup   LB Dressing Assistance 5  Supervision/Setup   Toileting Assistance  5  Supervision/Setup   Bed Mobility   Additional Comments pt greeted in bed side chair  will evaluate as able  Transfers   Sit to Stand 5  Supervision   Additional items Armrests; Increased time required;Verbal cues   Stand to Sit 5  Supervision   Additional items Armrests; Impulsive;Verbal cues   Additional Comments VC for hand placement, safe transfer techniques, and decreasing impulsivity   Functional Mobility   Functional Mobility 5  Supervision   Additional Comments Verbal cues to avoid obstacles on R side of environment during mobility 2* R visual deficit   Additional items   (w/o AD)   Balance   Static Sitting Good   Dynamic Sitting Fair +   Static Standing Fair   Dynamic Standing Fair -   Ambulatory Fair -   Activity Tolerance   Activity Tolerance Patient tolerated treatment well   Medical Staff Made Aware Ava PT   Nurse Made Aware drea DELACRUZ   RUE Assessment   RUE Assessment WFL  (4+/5 throughout)   LUE Assessment   LUE Assessment WFL  (4+/5 throughout)   Hand Function   Gross Motor Coordination Functional   Fine Motor Coordination Functional   Hand Function Comments Initially w/ decreased dexterity/coordination during finger-nose assessment, improving with practice     Sensation   Light Touch No apparent deficits   Proprioception   Proprioception No apparent deficits   Vision-Basic Assessment   Current Vision Wears glasses all the time   Visual History Cataracts   Patient Visual Report Eye fatigue/eye pain/headache;Other (Comment)  (Pt reports increased difficulty with R visual field since CVA  noted w/ decreased peripheral vision w/ R eye )   Vision - Complex Assessment   Tracking Intact   Visual Fields Difficulty detecting stimulus with OD RUQ; Difficulty detecting stimulus with OD RLQ   Acuity Able to read clock/calendar on wall without difficulty   Additional Comments Pt provided with education on lighthouse scanning technique to reduce obstruction with environmental objects 2* R visual field cut  Psychosocial   Psychosocial (WDL) X   Patient Behaviors/Mood Anxious;Sad;Tearful   Perception   Inattention/Neglect Cues to attend right visual field   Cognition   Overall Cognitive Status [facility]   Arousal/Participation Alert; Cooperative   Attention Within functional limits   Orientation Level Oriented X4   Memory Within functional limits   Following Commands Follows all commands and directions without difficulty   Comments anxious and tearful throughout evaluation   Assessment   Limitation Decreased ADL status; Decreased Safe judgement during ADL;Decreased endurance;Decreased self-care trans;Decreased high-level ADLs; Visual deficit   Prognosis Fair   Assessment Pt is a 80 y o , female, seen for OT Eval on 3/9/2023 admitted to [facility] with Cerebrovascular Accident due to embolism of L posterior cerebral artery  And new onset A-fib  Head CT (+) L occipital hypoattenuation  Head/neck CTA (+) L P2/PCA occlusion  Brain MRI (+) large acute/subacute L PCA terriroty infarct  Neurology consulted, stroke pathway, continue statin, telemetry monitoring and 2D echo pending  Cardiology consulted for new A-fib onset, medication management for tx plan  Relevant comorbidities and PMH which may impact occupational performance include: (+) smoker, depression, anxiety, tremor, acute upper respiratory infection  Active OT and activity orders include Up w/ assistance   Pt lives with daughter in a 1-level apartment with 2 ELANA  (+) home alone   PTA, pt was Independent in ADLs, IADLs, and functional transfers/mobility w/o AD  (+)   (-) falls  Upon evaluation, pt is Supervision in UB ADLs, LB ADLs, toileting, and  functional mobility/transfers w/o AD 2* the following deficits: weakness, decreased endurance and decreased balance, visual impairment  Other personal factors impacting pt performance in occupation include: fall risk, multiple lines , decreased functional mobility/transfers, (+) ELANA, limited home support, difficulty performing ADLs, difficulty performing IADLs, limited insight into deficits and decreased initiation or engagement  Pt greeted in bed side chair  Will continue to evaluate bed mobility as able  Pt noted with R visual field cut, occasionally colliding with environmental objects during functional mobility  Pt reports loss of R peripheral vision  Visual fields assessed, pt with increased difficulty detecting stimulus in OD RUQ and OD RLQ  Pt educated on lighthouse scanning technique to compensate for R visual field cut  Will continue to assess further visual impairments  Overall, these impairments act as barrier for the pt to safely and effectively perform in the following occupational areas: bathing, dressing, toileting, grooming, functional transfers/mobility and IADLs  OT to continue to follow pt for 1-2x/week to address the following goals to  in 10-14 days  Recommend Home w/ OPOT upon d/c  Goals   Patient Goals to get better   LTG Time Frame 10-14   Long Term Goal Please refer to LTGs below  Plan   Treatment Interventions ADL retraining;Visual perceptual retraining;Functional transfer training; Endurance training;Patient/family training;Equipment evaluation/education; Compensatory technique education;Continued evaluation; Energy conservation; Activityengagement   Goal Expiration Date 23   OT Treatment Day 0   OT Frequency 1-2x/wk   Recommendation   OT Discharge Recommendation Home with outpatient rehabilitation  (OPOT)   Additional Comments  The patient's raw score on the AM-PAC Daily Activity Inpatient Short Form is 19  A raw score of greater than or equal to 19 suggests the patient may benefit from discharge to home  Please refer to the recommendation of the Occupational Therapist for safe discharge planning  AM-PAC Daily Activity Inpatient   Lower Body Dressing 3   Bathing 3   Toileting 3   Upper Body Dressing 3   Grooming 3   Eating 4   Daily Activity Raw Score 19   Daily Activity Standardized Score (Calc for Raw Score >=11) 40 22   AM-[facility] Applied Cognition Inpatient   Following a Speech/Presentation 4   Understanding Ordinary Conversation 4   Taking Medications 4   Remembering Where Things Are Placed or Put Away 4   Remembering List of 4-5 Errands 4   Taking Care of Complicated Tasks 4   Applied Cognition Raw Score 24   Applied Cognition Standardized Score 62 21   End of Consult   Patient Position at End of Consult Bedside chair; All needs within reach  (w/ dtr)        Patient Goals    Pt will complete toileting w/ Mod I w/ G hygiene/thoroughness using AE/DME as needed  Pt will complete UB dressing/bathing w/ Mod I w/ use of AE as needed  Pt will complete LB dressing/bathing w/ Mod I w/ use of AE as needed  Pt will complete functional transfers with Mod I while practicing safe transfer techniques and using DME as needed  Pt will complete functional mobility during ADL/IADL/leisure tasks at Mod I level using DME as needed  Pt will be able to tolerate 8-10 min of standing functional activity w/ Fair+ dynamic standing balance  Pt will identify and verbalize 3 fall risks in home environment and identify 3 appropriate compensatory strategies to decrease fall risk at home  Pt will demonstrate G carryover of pt/caregiver education w/o VC and good tolerance in order to increase safety during functional activities       Pt will engage in ongoing Visual assessments, screens, and activities t/o fx'l I/ADL/leisure tasks w/ G participation and mod I to A w/ adaptation and accomodations or rule out visual impairments    Terrence Onofre, OTS

## 2023-03-09 NOTE — PROGRESS NOTES
Cardiology Progress Note - Rebekah Nguyen 80 y o  female MRN: [id number]    Unit/Bed#: E4 -01 Encounter: [id number]      Assessment & Plan:    Cerebrovascular accident (CVA) due to embolism of left posterior cerebral artery ([facility] )  -CTA head and neck 3/6/2023 showed occlusion of distal P2 segment of the left posterior cerebral artery  - Brain MRI 3/7/2023 showed large acute/subacute left PCA territory infarct with associated edema without significant mass effect or hemorrhagic transformation 2 tiny acute/subacute cortical infarcts in the posterior left frontal lobe  -Neurology following  - Appears to be cardioembolic in the setting of new onset atrial fibrillation  -On aspirin 81 mg daily and atorvastatin 40 mg daily    New onset a-fib ([facility] )  -Plan to start Eliquis 5 mg twice daily 2 weeks post stroke on 3/19/2023, will discontinue aspirin at that time  -Rate control with Toprol-XL 25 mg twice daily, Cardizem 30 mg every 6 hours and Cardizem drip  -TTE 3/7/2023 showed EF 54%, mildly dilated RV, mild LA and RA, mild TR    Hypercholesterolemia  - Continue with atorvastatin 40 mg daily    Tobacco abuse  - Encourage smoking cessation    Migraine with aura and without status migrainosus, not intractable    Summary:  - A-fib rates lightly better, but still elevated this morning  - Continue with Toprol-XL 25 mg twice daily and Cardizem 30 mg every 6 hours  -Blood pressure low overnight and again this morning, so will try to wean off Cardizem drip  - Will also start digoxin to better control rates without affecting blood pressure, start with digoxin 250 mcg IV every 6 hours for 3 doses, plan to continue with p o  digoxin tomorrow  -Per neurology, plan to start Eliquis 5 mg twice daily on 3/19 and stop aspirin at that time       Subjective:   No significant events overnight  She reports feeling little confused this morning, otherwise feels okay    Denies chest pain, shortness of breath, orthopnea, abdominal pain, nausea, vomiting, fever, chills, dizziness or palpitations  Objective:     Vitals: Blood pressure 104/70, pulse 86, temperature 98 °F (36 7 °C), temperature source Temporal, resp  rate 20, height 5' 4" (1 626 m), weight 69 2 kg (152 lb 8 9 oz), SpO2 95 %  , Body mass index is 26 19 kg/m² ,   Orthostatic Blood Pressures    Flowsheet Row Most Recent Value   Blood Pressure 104/70 filed at 03/09/2023 0828   Patient Position - Orthostatic VS Lying filed at 03/09/2023 0700          No intake or output data in the 24 hours ending 03/09/23 0946        Physical Exam:    GEN: Herrera Nguyen appears well, alert and oriented x 3, pleasant and cooperative   HEENT: anicteric, mucous membranes moist  NECK: no jvd, carotid bruits   HEART: Tachycardic, irregularly irregular, no significant audible murmurs  LUNGS: clear to auscultation bilaterally; no wheezes, rales, or rhonchi   ABDOMEN: normal bowel sounds, soft, no tenderness, no distention  EXTREMITIES: peripheral pulses normal; no clubbing, cyanosis, or edema  NEURO: no focal findings   SKIN: normal without suspicious lesions on exposed skin      Current Facility-Administered Medications:   •  acetaminophen (TYLENOL) tablet 650 mg, 650 mg, Oral, Q6H PRN, Danielle Hernandez PA-C  •  aluminum-magnesium hydroxide-simethicone (MYLANTA) oral suspension 30 mL, 30 mL, Oral, Q6H PRN, Danielle Hernandez PA-C  •  aspirin (ECOTRIN LOW STRENGTH) EC tablet 81 mg, 81 mg, Oral, Daily, Sae Ospina MD, 81 mg at 03/09/23 0827  •  atorvastatin (LIPITOR) tablet 40 mg, 40 mg, Oral, Daily With Christopher Ordonez PA-C, 40 mg at 03/08/23 1806  •  digoxin (LANOXIN) injection 250 mcg, 250 mcg, Intravenous, Q6H, Raj Roche MD  •  diltiazem (CARDIZEM) 125 mg in sodium chloride 0 9 % 125 mL infusion, 1-15 mg/hr, Intravenous, Titrated, Raj Roche MD, Last Rate: 7 5 mL/hr at 03/09/23 0218, 7 5 mg/hr at 03/09/23 0218  •  diltiazem (CARDIZEM) tablet 30 mg, 30 mg, Oral, Q6H [facility], Donta Ray MD, 30 mg at 03/09/23 5755  •  enoxaparin (LOVENOX) subcutaneous injection 40 mg, 40 mg, Subcutaneous, Daily, Danielle Hernandez PA-C, 40 mg at 03/09/23 0827  •  hydrOXYzine HCL (ATARAX) tablet 25 mg, 25 mg, Oral, Q6H PRN, Danielle Hernandez PA-C  •  melatonin tablet 3 mg, 3 mg, Oral, HS PRN, Danielle Hernandez PA-C  •  metoprolol (LOPRESSOR) injection 5 mg, 5 mg, Intravenous, Q4H PRN, Danielle Hernandez PA-C, 5 mg at 03/08/23 7101  •  metoprolol succinate (TOPROL-XL) 24 hr tablet 25 mg, 25 mg, Oral, Q12H [facility], Danielle Hernandez PA-C, 25 mg at 03/08/23 0801  •  nicotine (NICODERM CQ) 21 mg/24 hr TD 24 hr patch 1 patch, 1 patch, Transdermal, Daily, Danielle Hernandez PA-C, 1 patch at 03/09/23 0827  •  ondansetron (ZOFRAN) injection 4 mg, 4 mg, Intravenous, Q6H PRN, Danielle Hernandez PA-C  •  polyethylene glycol (MIRALAX) packet 17 g, 17 g, Oral, Daily, Danielle Hernandez PA-C    Labs & Results:    No results found for: CKTOTAL, CKMB, CKMBINDEX, TROPONINI    Lab Results   Component Value Date    CALCIUM 8 7 03/07/2023    K 3 8 03/07/2023    CO2 23 03/07/2023     03/07/2023    BUN 10 03/07/2023    CREATININE 0 54 (L) 03/07/2023       Lab Results   Component Value Date    WBC 11 05 (H) 03/07/2023    HGB 12 8 03/07/2023    HCT 38 4 03/07/2023    MCV 88 03/07/2023     03/07/2023     Results from last 7 days   Lab Units 03/06/23  1830   INR  0 99       No results found for: CHOL  Lab Results   Component Value Date    HDL 54 03/06/2023    HDL 65 04/26/2019     Lab Results   Component Value Date    LDLCALC 158 (H) 03/06/2023    LDLCALC 184 (H) 04/26/2019     Lab Results   Component Value Date    TRIG 73 03/06/2023    TRIG 85 04/26/2019       Lab Results   Component Value Date    ALT 12 03/06/2023    AST 21 03/06/2023         EKG personally reviewed by )Donta Ray MD  No acute changes   TELE: A-fib with RVR, otherwise no significant arrhythmias

## 2023-03-09 NOTE — ASSESSMENT & PLAN NOTE
• Multiple strokes on MRI brain, likely cardio embolic in the setting of new onset atrial fibrillation   • Per discussion with Dr Jeffrey Crum, due to longstanding history of smoking, check CT of chest abdomen and pelvis rule out underlying malignancy with hypercoagulable state  • CT chest abdomen pelvis with 2 indeterminate lesions on her kidneys -MRI recommended  • Plan to continue aspirin 81 mg daily and switch to anticoagulation in 2 weeks due to atrial fibrillation  • Due to size of the strokes, would not start anticoagulation Now  • Plan to start Eliquis 5 mg twice daily on 3/19/2023 and stop aspirin at that time  • Allow permissive hypertension, avoid hypotension  • Patient advised she can not longer drive at this time given R visual field cut  • Will need ophthalmology evaluation/fitness to drive test before considered safe to resume  • Neurology submitted [employer] form 3/7/2023  • Needs followup in neurology clinic

## 2023-03-09 NOTE — ASSESSMENT & PLAN NOTE
· Atrial flutter with acute strokes  · Due to large size of strokes and risk for hemorrhagic conversion, anticoagulation should not be started yet  · Per discussion with neurology, okay to start aspirin 81 mg daily  · Seen and evaluated by cardiology  · Will need followup with cardiology in 2 weeks as the plan is to start anticoagulation then   · Echocardiogram with normal EF 54%, R&L atrium with mild dilation, mild tricuspid regurg  · Currently with uncontrolled A-fib rates  · Continued on metoprolol succinate 25 mg twice daily  · Currently on Cardizem drip and Cardizem 30 mg every 6 hours  · Cardiology attempting to wean off Cardizem drip today  · Cardiology has also started digoxin to better control rates without affecting blood pressure    · Started on digoxin 250 mcg every 6 hours for 3 doses  · Hopeful plan to start oral digoxin tomorrow

## 2023-03-09 NOTE — PHYSICAL THERAPY NOTE
PHYSICAL THERAPY EVALUATION          Patient Name: Ruddy VOSS Date: 3/9/2023  PT EVALUATION    80 y o     [id number]    Migraine [G43 909]  New onset atrial fibrillation (HCC) [I48 91]  Abnormal CT of brain [R90 89]  Rapid atrial fibrillation (HCC) [I48 91]  Persistent headaches [R51 9]    Past Medical History:   Diagnosis Date    Migraine     Premature supraventricular beats      History reviewed  No pertinent surgical history  03/09/23 0955   PT Last Visit   PT Visit Date 03/09/23   Note Type   Note type Evaluation   Pain Assessment   Pain Assessment Tool 0-10   Pain Score No Pain   Restrictions/Precautions   Other Precautions Multiple lines;Telemetry; Fall Risk;Visual impairment   Home Living   Type of Home Apartment   Home Layout One level;Stairs to enter without rails   Bathroom Shower/Tub Tub/shower unit   [employer] Standard   Additional Comments 2 ELANA   Prior Function   Level of [place] Independent with ADLs; Independent with functional mobility; Independent with IADLS   Lives With Daughter   IADLs Independent with driving; Independent with meal prep; Independent with medication management   Falls in the last 6 months 0   Vocational Full time employment   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, 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   General   Additional Pertinent History pt admitted 3/6/23 for L PCA CVA  activity as tolerated orders   PMHx significant for migraine, new onset afib, current smoker   Cognition   Overall Cognitive Status WFL   Arousal/Participation Cooperative   Orientation Level Oriented X4   Memory Within functional limits   Following Commands Follows all commands and directions without difficulty   Comments tearful throughout session secondary to social and medical stressors   RLE Assessment   RLE Assessment WFL  (4/5)   LLE Assessment   LLE Assessment WFL  (4/5)   Coordination   Sensation WFL   Heel to [place] Intact   Transfers   Sit to Stand 5  Supervision Additional items Increased time required;Armrests   Stand to Sit 5  Supervision   Ambulation/Elevation   Gait pattern Excessively slow; Short stride  ("stiff")   Gait Assistance 5  Supervision   Additional items Assist x 1   Assistive Device None   Distance >300'   Stair Management Assistance 5  Supervision   Additional items Assist x 1; Increased time required   Stair Management Technique One rail R;One rail L;Alternating pattern; Foreward   Number of Stairs 4   Ambulation/Elevation Additional Comments ascend w RHR, descend w LHR   Balance   Static Standing Fair   Dynamic Standing Fair -   Ambulatory Fair -   Endurance Deficit   Endurance Deficit No   Activity Tolerance   Activity Tolerance Patient tolerated treatment well   Medical Staff Made Aware Afsaneh/Kiley OT   Nurse Made Aware cleared for therapy   Assessment   Prognosis Fair   Problem List Impaired vision   Assessment Lilibeth Villanueva is a 80 y o  female admitted to [facility] on 3/6/2023 for Cerebrovascular accident (CVA) due to embolism of cerebral artery ([facility] )  PT was consulted and pt was seen on 3/9/2023 for mobility assessment and d/c planning  Pt presents w medium fall risk, permissive HTN, multiple lines  At baseline is indep for ADLs, IADLs and ambulation without an AD  Pt is currently functioning at a supervision assistance x1 level for transfers, ambulation without an AD and stair negotiation  No significant deficits of strength noted  Currently ambulating community distances without difficulty  No gross LOB however did note occ poor obstacle avoidance (on R) when walking through doorways  Verbally cued to scan environment when ambulating  Otherwise no functional deficits noted  Would benefit from continued mobilization w nsg/ restorative  At this time PT recommendations for d/c are home when medically stable     Barriers to Discharge None   Plan   PT Frequency   (d/c PT: maintain on restorative)   Recommendation   PT Discharge Recommendation (S)  Home with outpatient rehabilitation  (vision therapy)   AM-PAC Basic Mobility Inpatient   Turning in Flat Bed Without Bedrails 4   Lying on Back to Sitting on Edge of Flat Bed Without Bedrails 3   Moving Bed to Chair 3   Standing Up From Chair Using Arms 3   Walk in Room 3   Climb 3-5 Stairs With Railing 3   Basic Mobility Inpatient Raw Score 19   Basic Mobility Standardized Score 42 48   Highest Level Of Mobility   JH-HLM Goal 6: Walk 10 steps or more   JH-HLM Achieved 8: Walk 250 feet ot more   End of Consult   Patient Position at End of Consult Bedside chair; All needs within reach   History: co - morbidities, coping styles, fall risk, multiple lines  Exam: impairments in systems including musculoskeletal (strength), neuromuscular (balance, coordination, gait, transfers, motor function and sensation), am-pac, cognition  Clinical: unstable/unpredictable; visual impairment, ongoing medical management  Complexity:high      Phoebe Sullivan, PT

## 2023-03-09 NOTE — PLAN OF CARE
Problem: OCCUPATIONAL THERAPY ADULT  Goal: Performs self-care activities at highest level of function for planned discharge setting  See evaluation for individualized goals  Description: Treatment Interventions: ADL retraining, Visual perceptual retraining, Functional transfer training, Endurance training, Patient/family training, Equipment evaluation/education, Compensatory technique education, Continued evaluation, Energy conservation, Activityengagement          See flowsheet documentation for full assessment, interventions and recommendations  Note: Limitation: Decreased ADL status, Decreased Safe judgement during ADL, Decreased endurance, Decreased self-care trans, Decreased high-level ADLs, Visual deficit  Prognosis: Fair  Assessment: Pt is a 80 y o , female, seen for OT Eval on 3/9/2023 admitted to [place] [address] with Cerebrovascular Accident due to embolism of L posterior cerebral artery  And new onset A-fib  Head CT (+) L occipital hypoattenuation  Head/neck CTA (+) L P2/PCA occlusion  Brain MRI (+) large acute/subacute L PCA terriroty infarct  Neurology consulted, stroke pathway, continue statin, telemetry monitoring and 2D echo pending  Cardiology consulted for new A-fib onset, medication management for tx plan  Relevant comorbidities and PMH which may impact occupational performance include: (+) smoker, depression, anxiety, tremor, acute upper respiratory infection  Active OT and activity orders include Up w/ assistance   Pt lives with daughter in a 1-level apartment with 2 ELANA  (+) home alone  PTA, pt was Independent in ADLs, IADLs, and functional transfers/mobility w/o AD  (+)   (-) falls  Upon evaluation, pt is Supervision in UB ADLs, LB ADLs, toileting, and  functional mobility/transfers w/o AD 2* the following deficits: weakness, decreased endurance and decreased balance, visual impairment   Other personal factors impacting pt performance in occupation include: fall risk, multiple lines , decreased functional mobility/transfers, (+) ELANA, limited home support, difficulty performing ADLs, difficulty performing IADLs, limited insight into deficits and decreased initiation or engagement  Pt greeted in bed side chair  Will continue to evaluate bed mobility as able  Pt noted with R visual field cut, occasionally colliding with environmental objects during functional mobility  Pt reports loss of R peripheral vision  Visual fields assessed, pt with increased difficulty detecting stimulus in OD RUQ and OD RLQ  Pt educated on lighthouse scanning technique to compensate for R visual field cut  Will continue to assess further visual impairments  Overall, these impairments act as barrier for the pt to safely and effectively perform in the following occupational areas: bathing, dressing, toileting, grooming, functional transfers/mobility and IADLs  OT to continue to follow pt for 1-2x/week to address the following goals to  in 10-14 days   Recommend Home w/ OPOT upon d/c      OT Discharge Recommendation: Home with outpatient rehabilitation (OPOT)

## 2023-03-10 ENCOUNTER — APPOINTMENT (INPATIENT)
Dept: MRI IMAGING | Facility: HOSPITAL | Age: 68
End: 2023-03-10

## 2023-03-10 RX ORDER — DIGOXIN 125 MCG
125 TABLET ORAL DAILY
Status: DISCONTINUED | OUTPATIENT
Start: 2023-03-10 | End: 2023-03-11 | Stop reason: HOSPADM

## 2023-03-10 RX ADMIN — DILTIAZEM HYDROCHLORIDE 30 MG: 30 TABLET, FILM COATED ORAL at 00:11

## 2023-03-10 RX ADMIN — METOPROLOL SUCCINATE 25 MG: 25 TABLET, EXTENDED RELEASE ORAL at 08:30

## 2023-03-10 RX ADMIN — ENOXAPARIN SODIUM 40 MG: 100 INJECTION SUBCUTANEOUS at 08:30

## 2023-03-10 RX ADMIN — DILTIAZEM HYDROCHLORIDE 30 MG: 30 TABLET, FILM COATED ORAL at 05:34

## 2023-03-10 RX ADMIN — ASPIRIN 81 MG: 81 TABLET, COATED ORAL at 08:30

## 2023-03-10 RX ADMIN — DIGOXIN 125 MCG: 125 TABLET ORAL at 18:25

## 2023-03-10 RX ADMIN — Medication 1 PATCH: at 08:30

## 2023-03-10 RX ADMIN — DILTIAZEM HYDROCHLORIDE 30 MG: 30 TABLET, FILM COATED ORAL at 18:25

## 2023-03-10 RX ADMIN — ATORVASTATIN CALCIUM 40 MG: 40 TABLET, FILM COATED ORAL at 18:25

## 2023-03-10 RX ADMIN — GADOBUTROL 7 ML: 604.72 INJECTION INTRAVENOUS at 18:04

## 2023-03-10 RX ADMIN — Medication 3 MG: at 21:40

## 2023-03-10 RX ADMIN — DILTIAZEM HYDROCHLORIDE 30 MG: 30 TABLET, FILM COATED ORAL at 23:48

## 2023-03-10 RX ADMIN — DILTIAZEM HYDROCHLORIDE 30 MG: 30 TABLET, FILM COATED ORAL at 11:54

## 2023-03-10 NOTE — ASSESSMENT & PLAN NOTE
• Multiple strokes on MRI brain, likely cardio embolic in the setting of new onset atrial fibrillation   • Due to longstanding history of smoking, CT of chest abdomen and pelvis rule out underlying malignancy with hypercoagulable state - 2 indeterminate lesions on her kidneys - MRI recommended and pending  • Plan to continue aspirin 81 mg daily and switch to anticoagulation in 2 weeks due to atrial fibrillation  • Due to size of the strokes, would not start anticoagulation Now  • Plan to start Eliquis 5 mg twice daily on 3/19/2023 and stop aspirin at that time  • Avoid hypotension given intracranial occlusion  • Patient advised she can not longer drive at this time given R visual field cut  • Will need ophthalmology evaluation/fitness to drive test before considered safe to resume  • Neurology submitted [employer] form 3/7/2023  • Needs followup in neurology clinic

## 2023-03-10 NOTE — PLAN OF CARE
Problem: Potential for Falls  Goal: Patient will remain free of falls  Description: INTERVENTIONS:  - Educate patient/family on patient safety including physical limitations  - Instruct patient to call for assistance with activity   - Consult OT/PT to assist with strengthening/mobility   - Keep Call bell within reach  - Keep bed low and locked with side rails adjusted as appropriate  - Keep care items and personal belongings within reach  - Initiate and maintain comfort rounds  - Make Fall Risk Sign visible to staff  - Offer Toileting every  Hours, in advance of need  - Initiate/Maintain alarm  - Obtain necessary fall risk management equipment:   - Apply yellow socks and bracelet for high fall risk patients  - Consider moving patient to room near nurses station  Outcome: Progressing     Problem: PAIN - ADULT  Goal: Verbalizes/displays adequate comfort level or baseline comfort level  Description: Interventions:  - Encourage patient to monitor pain and request assistance  - Assess pain using appropriate pain scale  - Administer analgesics based on type and severity of pain and evaluate response  - Implement non-pharmacological measures as appropriate and evaluate response  - Consider cultural and social influences on pain and pain management  - Notify physician/advanced practitioner if interventions unsuccessful or patient reports new pain  Outcome: Progressing     Problem: INFECTION - ADULT  Goal: Absence or prevention of progression during hospitalization  Description: INTERVENTIONS:  - Assess and monitor for signs and symptoms of infection  - Monitor lab/diagnostic results  - Monitor all insertion sites, i e  indwelling lines, tubes, and drains  - Monitor endotracheal if appropriate and nasal secretions for changes in amount and color  - [place] appropriate cooling/warming therapies per order  - Administer medications as ordered  - Instruct and encourage patient and family to use good hand hygiene technique  - Identify and instruct in appropriate isolation precautions for identified infection/condition  Outcome: Progressing  Goal: Absence of fever/infection during neutropenic period  Description: INTERVENTIONS:  - Monitor WBC    Outcome: Progressing     Problem: SAFETY ADULT  Goal: Patient will remain free of falls  Description: INTERVENTIONS:  - Educate patient/family on patient safety including physical limitations  - Instruct patient to call for assistance with activity   - Consult OT/PT to assist with strengthening/mobility   - Keep Call bell within reach  - Keep bed low and locked with side rails adjusted as appropriate  - Keep care items and personal belongings within reach  - Initiate and maintain comfort rounds  - Make Fall Risk Sign visible to staff  - Offer Toileting every  Hours, in advance of need  - Initiate/Maintain alarm  - Obtain necessary fall risk management equipment:   - Apply yellow socks and bracelet for high fall risk patients  - Consider moving patient to room near nurses station  Outcome: Progressing  Goal: Maintain or return to baseline ADL function  Description: INTERVENTIONS:  -  Assess patient's ability to carry out ADLs; assess patient's baseline for ADL function and identify physical deficits which impact ability to perform ADLs (bathing, care of mouth/teeth, toileting, grooming, dressing, etc )  - Assess/evaluate cause of self-care deficits   - Assess range of motion  - Assess patient's mobility; develop plan if impaired  - Assess patient's need for assistive devices and provide as appropriate  - Encourage maximum independence but intervene and supervise when necessary  - Involve family in performance of ADLs  - Assess for home care needs following discharge   - Consider OT consult to assist with ADL evaluation and planning for discharge  - Provide patient education as appropriate  Outcome: Progressing  Goal: Maintains/Returns to pre admission functional level  Description: INTERVENTIONS:  - Perform BMAT or MOVE assessment daily    - Set and communicate daily mobility goal to care team and patient/family/caregiver  - Collaborate with rehabilitation services on mobility goals if consulted  - Perform Range of Motion  times a day  - Reposition patient every  hours  - Dangle patient  times a day  - Stand patient  times a day  - Ambulate patient  times a day  - Out of bed to chair  times a day   - Out of bed for meals times a day  - Out of bed for toileting  - Record patient progress and toleration of activity level   Outcome: Progressing     Problem: DISCHARGE PLANNING  Goal: Discharge to home or other facility with appropriate resources  Description: INTERVENTIONS:  - Identify barriers to discharge w/patient and caregiver  - Arrange for needed discharge resources and transportation as appropriate  - Identify discharge learning needs (meds, wound care, etc )  - Arrange for interpretive services to assist at discharge as needed  - Refer to Case Management Department for coordinating discharge planning if the patient needs post-hospital services based on physician/advanced practitioner order or complex needs related to functional status, cognitive ability, or social support system  Outcome: Progressing     Problem: Knowledge Deficit  Goal: Patient/family/caregiver demonstrates understanding of disease process, treatment plan, medications, and discharge instructions  Description: Complete learning assessment and assess knowledge base  Interventions:  - Provide teaching at level of understanding  - Provide teaching via preferred learning methods  Outcome: Progressing     Problem: Neurological Deficit  Goal: Neurological status is stable or improving  Description: Interventions:  - Monitor and assess patient's level of consciousness, motor function, sensory function, and level of assistance needed for ADLs  - Monitor and report changes from baseline   Collaborate with interdisciplinary team to initiate plan and implement interventions as ordered  - Provide and maintain a safe environment  - Consider seizure precautions  - Consider fall precautions  - Consider aspiration precautions  - Consider bleeding precautions  Outcome: Progressing     Problem: Activity Intolerance/Impaired Mobility  Goal: Mobility/activity is maintained at optimum level for patient  Description: Interventions:  - Assess and monitor patient  barriers to mobility and need for assistive/adaptive devices  - Assess patient's emotional response to limitations  - Collaborate with interdisciplinary team and initiate plans and interventions as ordered  - Encourage independent activity per ability   - Maintain proper body alignment  - Perform active/passive rom as tolerated/ordered  - Plan activities to conserve energy   - Turn patient as appropriate  Outcome: Progressing     Problem: Communication Impairment  Goal: Ability to express needs and understand communication  Description: Assess patient's communication skills and ability to understand information  Patient will demonstrate use of effective communication techniques, alternative methods of communication and understanding even if not able to speak  - Encourage communication and provide alternate methods of communication as needed  - Collaborate with case management/ for discharge needs  - Include patient/family/caregiver in decisions related to communication  Outcome: Progressing     Problem: Potential for Aspiration  Goal: Non-ventilated patient's risk of aspiration is minimized  Description: Assess and monitor vital signs, respiratory status, and labs (WBC)  Monitor for signs of aspiration (tachypnea, cough, rales, wheezing, cyanosis, fever)  - Assess and monitor patient's ability to swallow  - Place patient up in chair to eat if possible    - HOB up at 90 degrees to eat if unable to get patient up into chair   - Supervise patient during oral intake  - Instruct patient/ family to take small bites  - Instruct patient/ family to take small single sips when taking liquids  - Follow patient-specific strategies generated by speech pathologist   Outcome: Progressing  Goal: Ventilated patient's risk of aspiration is minimized  Description: Assess and monitor vital signs, respiratory status, airway cuff pressure, and labs (WBC)  Monitor for signs of aspiration (tachypnea, cough, rales, wheezing, cyanosis, fever)  - Elevate head of bed 30 degrees if patient has tube feeding   - Monitor tube feeding  Outcome: Progressing     Problem: Nutrition  Goal: Nutrition/Hydration status is improving  Description: Monitor and assess patient's nutrition/hydration status for malnutrition (ex- brittle hair, bruises, dry skin, pale skin and conjunctiva, muscle wasting, smooth red tongue, and disorientation)  Collaborate with interdisciplinary team and initiate plan and interventions as ordered  Monitor patient's weight and dietary intake as ordered or per policy  Utilize nutrition screening tool and intervene per policy  Determine patient's food preferences and provide high-protein, high-caloric foods as appropriate  - Assist patient with eating   - Allow adequate time for meals   - Encourage patient to take dietary supplement as ordered  - Collaborate with clinical nutritionist   - Include patient/family/caregiver in decisions related to nutrition    Outcome: Progressing     Problem: NEUROSENSORY - ADULT  Goal: Achieves stable or improved neurological status  Description: INTERVENTIONS  - Monitor and report changes in neurological status  - Monitor vital signs such as temperature, blood pressure, glucose, and any other labs ordered   - Initiate measures to prevent increased intracranial pressure  - Monitor for seizure activity and implement precautions if appropriate      Outcome: Progressing  Goal: Remains free of injury related to seizures activity  Description: INTERVENTIONS  - Maintain airway, patient safety  and administer oxygen as ordered  - Monitor patient for seizure activity, document and report duration and description of seizure to physician/advanced practitioner  - If seizure occurs,  ensure patient safety during seizure  - Reorient patient post seizure  - Seizure pads on all 4 side rails  - Instruct patient/family to notify RN of any seizure activity including if an aura is experienced  - Instruct patient/family to call for assistance with activity based on nursing assessment  - Administer anti-seizure medications if ordered    Outcome: Progressing  Goal: Achieves maximal functionality and self care  Description: INTERVENTIONS  - Monitor swallowing and airway patency with patient fatigue and changes in neurological status  - Encourage and assist patient to increase activity and self care     - Encourage visually impaired, hearing impaired and aphasic patients to use assistive/communication devices  Outcome: Progressing     Problem: CARDIOVASCULAR - ADULT  Goal: Maintains optimal cardiac output and hemodynamic stability  Description: INTERVENTIONS:  - Monitor I/O, vital signs and rhythm  - Monitor for S/S and trends of decreased cardiac output  - Administer and titrate ordered vasoactive medications to optimize hemodynamic stability  - Assess quality of pulses, skin color and temperature  - Assess for signs of decreased coronary artery perfusion  - Instruct patient to report change in severity of symptoms  Outcome: Progressing  Goal: Absence of cardiac dysrhythmias or at baseline rhythm  Description: INTERVENTIONS:  - Continuous cardiac monitoring, vital signs, obtain 12 lead EKG if ordered  - Administer antiarrhythmic and heart rate control medications as ordered  - Monitor electrolytes and administer replacement therapy as ordered  Outcome: Progressing

## 2023-03-10 NOTE — PLAN OF CARE
Problem: Potential for Falls  Goal: Patient will remain free of falls  Description: INTERVENTIONS:  - Educate patient/family on patient safety including physical limitations  - Instruct patient to call for assistance with activity   - Consult OT/PT to assist with strengthening/mobility   - Keep Call bell within reach  - Keep bed low and locked with side rails adjusted as appropriate  - Keep care items and personal belongings within reach  - Initiate and maintain comfort rounds  - Make Fall Risk Sign visible to staff  - Offer Toileting every 2 Hours, in advance of need  - Initiate/Maintain bed alarm  Problem: PAIN - ADULT  Goal: Verbalizes/displays adequate comfort level or baseline comfort level  Description: Interventions:  - Encourage patient to monitor pain and request assistance  - Assess pain using appropriate pain scale  - Administer analgesics based on type and severity of pain and evaluate response  - Implement non-pharmacological measures as appropriate and evaluate response  - Consider cultural and social influences on pain and pain management  - Notify physician/advanced practitioner if interventions unsuccessful or patient reports new pain  Outcome: Progressing     Problem: INFECTION - ADULT  Goal: Absence or prevention of progression during hospitalization  Description: INTERVENTIONS:  - Assess and monitor for signs and symptoms of infection  - Monitor lab/diagnostic results  - Monitor all insertion sites, i e  indwelling lines, tubes, and drains  - Monitor endotracheal if appropriate and nasal secretions for changes in amount and color  - [place] appropriate cooling/warming therapies per order  - Administer medications as ordered  - Instruct and encourage patient and family to use good hand hygiene technique  - Identify and instruct in appropriate isolation precautions for identified infection/condition  Outcome: Progressing  Goal: Absence of fever/infection during neutropenic period  Description: INTERVENTIONS:  - Monitor WBC    Outcome: Progressing     Problem: DISCHARGE PLANNING  Goal: Discharge to home or other facility with appropriate resources  Description: INTERVENTIONS:  - Identify barriers to discharge w/patient and caregiver  - Arrange for needed discharge resources and transportation as appropriate  - Identify discharge learning needs (meds, wound care, etc )  - Arrange for interpretive services to assist at discharge as needed  - Refer to Case Management Department for coordinating discharge planning if the patient needs post-hospital services based on physician/advanced practitioner order or complex needs related to functional status, cognitive ability, or social support system  Outcome: Progressing     Problem: Neurological Deficit  Goal: Neurological status is stable or improving  Description: Interventions:  - Monitor and assess patient's level of consciousness, motor function, sensory function, and level of assistance needed for ADLs  - Monitor and report changes from baseline  Collaborate with interdisciplinary team to initiate plan and implement interventions as ordered  - Provide and maintain a safe environment  - Consider seizure precautions  - Consider fall precautions  - Consider aspiration precautions  - Consider bleeding precautions  Outcome: Progressing     Problem: Activity Intolerance/Impaired Mobility  Goal: Mobility/activity is maintained at optimum level for patient  Description: Interventions:  - Assess and monitor patient  barriers to mobility and need for assistive/adaptive devices  - Assess patient's emotional response to limitations  - Collaborate with interdisciplinary team and initiate plans and interventions as ordered  - Encourage independent activity per ability   - Maintain proper body alignment  - Perform active/passive rom as tolerated/ordered    - Plan activities to conserve energy   - Turn patient as appropriate  Outcome: Progressing     Problem: Communication Impairment  Goal: Ability to express needs and understand communication  Description: Assess patient's communication skills and ability to understand information  Patient will demonstrate use of effective communication techniques, alternative methods of communication and understanding even if not able to speak  - Encourage communication and provide alternate methods of communication as needed  - Collaborate with case management/ for discharge needs  - Include patient/family/caregiver in decisions related to communication  Outcome: Progressing     Problem: Potential for Aspiration  Goal: Non-ventilated patient's risk of aspiration is minimized  Description: Assess and monitor vital signs, respiratory status, and labs (WBC)  Monitor for signs of aspiration (tachypnea, cough, rales, wheezing, cyanosis, fever)  - Assess and monitor patient's ability to swallow  - Place patient up in chair to eat if possible  - HOB up at 90 degrees to eat if unable to get patient up into chair   - Supervise patient during oral intake  - Instruct patient/ family to take small bites  - Instruct patient/ family to take small single sips when taking liquids  - Follow patient-specific strategies generated by speech pathologist   Outcome: Progressing  Goal: Ventilated patient's risk of aspiration is minimized  Description: Assess and monitor vital signs, respiratory status, airway cuff pressure, and labs (WBC)  Monitor for signs of aspiration (tachypnea, cough, rales, wheezing, cyanosis, fever)  - Elevate head of bed 30 degrees if patient has tube feeding   - Monitor tube feeding    Outcome: Progressing     - Apply yellow socks and bracelet for high fall risk patients  - Consider moving patient to room near nurses station  Outcome: Progressing

## 2023-03-10 NOTE — PROGRESS NOTES
[address] [place] [address]  Progress Note Daryl Miranda 1955, 80 y o  female MRN: [id number]  Unit/Bed#: E4 -01 Encounter: [id number]  Primary Care Provider: No primary care provider on file  Date and time admitted to hospital: 3/6/2023  5:51 PM    * Cerebrovascular accident (CVA) due to embolism of cerebral artery ([facility] [address] )  Assessment & Plan  • Multiple strokes on MRI brain, likely cardio embolic in the setting of new onset atrial fibrillation   • Due to longstanding history of smoking, CT of chest abdomen and pelvis rule out underlying malignancy with hypercoagulable state - 2 indeterminate lesions on her kidneys - MRI recommended and pending  • Plan to continue aspirin 81 mg daily and switch to anticoagulation in 2 weeks due to atrial fibrillation  • Due to size of the strokes, would not start anticoagulation Now  • Plan to start Eliquis 5 mg twice daily on 3/19/2023 and stop aspirin at that time  • Avoid hypotension given intracranial occlusion  • Patient advised she can not longer drive at this time given R visual field cut  • Will need ophthalmology evaluation/fitness to drive test before considered safe to resume  • Neurology submitted [employer] form 3/7/2023  • Needs followup in neurology clinic     New onset a-fib [facility])  Assessment & Plan  · Atrial flutter with acute strokes  · Due to large size of strokes and risk for hemorrhagic conversion, anticoagulation should not be started yet    · Continue aspirin 81 mg daily  · Seen and evaluated by cardiology - f/u with cardiology in 2 weeks as the plan is to start anticoagulation then   · Echocardiogram with normal EF 54%, R&L atrium with mild dilation, mild tricuspid regurg  · Rates vary, controlled on exam, telemetry with rates 's  · Continue on metoprolol succinate 25 mg twice daily  · Currently on Cardizem drip with attempt to wean off and oral Cardizem 30 mg every 6 hours  · S/p digoxin 250 mcg every 6 hours x 3 doses to better control rates without affecting blood pressure  Hopes to transition to oral today  · Hopeful plan to start oral digoxin today, f/u cardiology recommendations    Lesion of native kidney  Assessment & Plan  · 2 indeterminate lesions noted on CT scan abd/pelvis to rule out malignancy measuring 0 9 cm in the right and left kidneys which do not meet criteria for simple cyst   Further recommendation recommended with MRI  · MRI ordered and pending    Migraine with aura and without status migrainosus, not intractable  Assessment & Plan  · Reports long history of migraines into her childhood  · Worsened with recent stressors  · Typically comes with visual auras, sometimes speech problems if particularly severe  · Without current headache  • Migraine precautions, dim lights, silence sounds if necessary  • Analgesia PRN    Hypercholesterolemia  Assessment & Plan  · Cholesterol 227, triglycerides 73, HDL 54,   · Started on statin here, continue Lipitor 40 mg daily    Tobacco abuse  Assessment & Plan  · Current smoker  · Discussed cessation  · NRT      VTE Pharmacologic Prophylaxis: VTE Score: 3 Aspirin and Lovenox    Patient Centered Rounds: I performed bedside rounds with nursing staff today  Discussions with Specialists or Other Care Team Provider: Cardiology, Neurology    Education and Discussions with Family / Patient: family coming in later, will update bedside  Total Time Spent on Date of Encounter in care of patient: 35 minutes This time was spent on one or more of the following: performing physical exam; counseling and coordination of care; obtaining or reviewing history; documenting in the medical record; reviewing/ordering tests, medications or procedures; communicating with other healthcare professionals and discussing with patient's family/caregivers      Current Length of Stay: 4 day(s)  Current Patient Status: Inpatient   Certification Statement: The patient will continue to require additional inpatient hospital stay due to heart rate/ blood pressure control, MRI imaging  Discharge Plan: Anticipate discharge in 24-48 hrs to home  Code Status: Level 1 - Full Code    Subjective:   Patient seen and examined  Reports she is feeling slightly anxious today and more tired from the melatonin last night  She is alert and oriented to person place and time  Denies any new areas of weakness, numbness or tingling, or difficulty speaking  She reports she used to be on an antidepressant medication a long time ago but is not interested at this time restarting one  She denies suicidal ideations and shares that she did not realize she would be here this long so it is upsetting  She denies fever, chills, chest pain, shortness of breath, heart palpitations, abdominal pain  She is going to the bathroom without difficulty, denies burning with urination or blood in her urine  She does not have much of an appetite currently  Objective:     Vitals:   Temp (24hrs), Av 5 °F (36 4 °C), Min:97 °F (36 1 °C), Max:98 °F (36 7 °C)    Temp:  [97 °F (36 1 °C)-98 °F (36 7 °C)] 97 8 °F (36 6 °C)  HR:  [80-99] 80  Resp:  [18-22] 18  BP: (101-123)/(59-73) 123/73  SpO2:  [96 %-98 %] 96 %  Body mass index is 26 23 kg/m²  Input and Output Summary (last 24 hours):   No intake or output data in the 24 hours ending 03/10/23 0845    Physical Exam:   Physical Exam  Vitals and nursing note reviewed  Constitutional:       General: She is not in acute distress  Appearance: Normal appearance  She is well-developed  She is not ill-appearing  HENT:      Head: Normocephalic and atraumatic  Eyes:      Extraocular Movements: Extraocular movements intact  Conjunctiva/sclera: Conjunctivae normal       Comments: Visual deficit in right eye noted   Cardiovascular:      Rate and Rhythm: Normal rate  Rhythm irregularly irregular  Heart sounds: Normal heart sounds     Pulmonary:      Effort: Pulmonary effort is normal  No respiratory distress  Breath sounds: Normal breath sounds  No wheezing, rhonchi or rales  Abdominal:      General: Bowel sounds are normal       Palpations: Abdomen is soft  Tenderness: There is no abdominal tenderness  Musculoskeletal:      Right lower leg: No edema  Left lower leg: No edema  Skin:     General: Skin is warm and dry  Neurological:      General: No focal deficit present  Mental Status: She is alert and oriented to person, place, and time        Comments: Good strength in b/l upper and lower extremities   Psychiatric:         Mood and Affect: Mood normal       Comments: Anxious          Additional Data:     Labs:  Results from last 7 days   Lab Units 03/07/23  0426 03/06/23  1830   WBC Thousand/uL 11 05* 11 26*   HEMOGLOBIN g/dL 12 8 13 5   HEMATOCRIT % 38 4 40 6   PLATELETS Thousands/uL 324 362   NEUTROS PCT %  --  67   LYMPHS PCT %  --  24   MONOS PCT %  --  7   EOS PCT %  --  1     Results from last 7 days   Lab Units 03/07/23  0426 03/06/23  1830   SODIUM mmol/L 138 137   POTASSIUM mmol/L 3 8 3 8   CHLORIDE mmol/L 107 105   CO2 mmol/L 23 22   BUN mg/dL 10 12   CREATININE mg/dL 0 54* 0 62   ANION GAP mmol/L 8 10   CALCIUM mg/dL 8 7 9 3   ALBUMIN g/dL  --  4 2   TOTAL BILIRUBIN mg/dL  --  0 51   ALK PHOS U/L  --  72   ALT U/L  --  12   AST U/L  --  21   GLUCOSE RANDOM mg/dL 108 113     Results from last 7 days   Lab Units 03/06/23  1830   INR  0 99         Results from last 7 days   Lab Units 03/06/23  1830   HEMOGLOBIN A1C % 5 6       Lines/Drains:  Invasive Devices     Peripheral Intravenous Line  Duration           Peripheral IV 03/09/23 Right Antecubital <1 day                Telemetry:  Telemetry Orders (From admission, onward)             48 Hour Telemetry Monitoring  (ED Bridging Orders Panel)  Continuous x 48 hours        References:    Telemetry Guidelines   Question:  Reason for 48 Hour Telemetry  Answer:  Patient on Vasopressor or Vasoactive IV meds Telemetry Reviewed: Atrial fibrillation  HR averaging   Indication for Continued Telemetry Use: Acute CVA    Imaging: Reviewed radiology reports from this admission including: chest xray, chest CT scan, abdominal/pelvic CT, CT head, MRI brain, xray(s) and ECHO    Recent Cultures (last 7 days):     Last 24 Hours Medication List:   Current Facility-Administered Medications   Medication Dose Route Frequency Provider Last Rate   • acetaminophen  650 mg Oral Q6H PRN Danielle Hernandez PA-C     • aluminum-magnesium hydroxide-simethicone  30 mL Oral Q6H PRN Danielle Hernandez PA-C     • aspirin  81 mg Oral Daily Rosa Watkins MD     • atorvastatin  40 mg Oral Daily With Damaris Hernandez PA-C     • diltiazem  1-15 mg/hr Intravenous Titrated Julian Romero MD Stopped (03/10/23 0308)   • diltiazem  30 mg Oral Q6H [address] Julian Romero MD     • enoxaparin  40 mg Subcutaneous Daily Danielle Hernandez PA-C     • hydrOXYzine HCL  25 mg Oral Q6H PRN Danielle Hernandez PA-C     • melatonin  3 mg Oral HS PRN Danielle Hernandez PA-C     • metoprolol  5 mg Intravenous Q4H PRN Danielle Hernandez PA-C     • metoprolol succinate  25 mg Oral Q12H [address] Danielle Hernandez PA-C     • nicotine  1 patch Transdermal Daily Danielle Hernandez PA-C     • ondansetron  4 mg Intravenous Q6H PRN Danielle Hernandez PA-C     • polyethylene glycol  17 g Oral Daily Danielle Pineda PA-C          Today, Patient Was Seen By: Tameka Smith PA-C    **Please Note: This note may have been constructed using a voice recognition system  **

## 2023-03-10 NOTE — ASSESSMENT & PLAN NOTE
· Reports long history of migraines into her childhood  · Worsened with recent stressors  · Typically comes with visual auras, sometimes speech problems if particularly severe     · Without current headache  • Migraine precautions, dim lights, silence sounds if necessary  • Analgesia PRN

## 2023-03-10 NOTE — RESTORATIVE TECHNICIAN NOTE
Restorative Technician Note      Patient Name: Kassandra Beltran     Restorative Tech Visit Date: 03/10/23  Note Type: Mobility  Patient Position Upon Consult: Standing  Activity Performed: Ambulated  Patient Position at End of Consult: Standing;  All needs within reach

## 2023-03-10 NOTE — ASSESSMENT & PLAN NOTE
· Cholesterol 227, triglycerides 73, HDL 54,   · Started on statin here, continue Lipitor 40 mg daily

## 2023-03-10 NOTE — PROGRESS NOTES
Cardiology         Progress Note - Cardiology   Julio Nguyen 80 y o  female MRN: [id number]  Unit/Bed#: E4 -01 Encounter: [id number]          Assessment/Recommendations/Discussion:     Cerebrovascular [facility] (CV[facility]  -CTA head and neck 3/6/2023 showed occlusion of distal P2 segment of the left posterior cerebral artery  - Brain MRI 3/7/2023 showed large acute/subacute left PCA territory infarct with associated edema without significant mass effect or hemorrhagic transformation 2 tiny acute/subacute cortical infarcts in the posterior left frontal lobe  -Neurology following  - Appears to be cardioembolic in the setting of new onset atrial fibrillation  -On aspirin 81 mg daily and atorvastatin 40 mg daily    New onset a-fib ([facility] [address] )  -Plan to start Eliquis 5 mg twice daily 2 weeks post stroke on 3/19/2023, will discontinue aspirin at that time  -Rate control with Toprol-XL 25 mg twice daily, Cardizem 30 mg every 6 hours and Cardizem drip  -TTE 3/7/2023 showed EF 54%, mildly dilated RV, mild LA and RA, mild TR    Hypercholesterolemia  - Continue with atorvastatin 40 mg daily    Tobacco abuse  - Encourage smoking cessation    Migraine with aura and without status migrainosus, not intractable        · Check digoxin level in am   Start digoxin 0 125 mg po daily  · Continue metoprolol and diltiazem  · Echo with normal LVEF 54%  · Start Eliquis when cleared by neurology            Subjective: Pt seen/examined    Feels well, no complaints                Physical Exam:  GEN:  NAD  HEENT:  MMM, NCAT, pink conjunctiva, EOMI, nonicteric sclera  CV:  NO JVD/HJR, irregularly irregular rhythm, NO M/R/G, +S1/S2, NO PARASTERNAL HEAVE/THRILL, NO LE EDEMA, NO HEPATIC SYSTOLIC PULSATION, WARM EXTREMITIES  RESP:  CTAB/L  ABD:  SOFT, NT, NO GROSS ORGANOMEGALY        Vitals:   /68 (BP Location: Right arm)   Pulse 88   Temp (!) 97 4 °F (36 3 °C) (Temporal)   Resp 18 Ht 5' 4" (1 626 m)   Wt 69 3 kg (152 lb 12 8 oz)   SpO2 97%   BMI 26 23 kg/m²   Vitals:    03/09/23 0600 03/10/23 0536   Weight: 69 2 kg (152 lb 8 9 oz) 69 3 kg (152 lb 12 8 oz)     No intake or output data in the 24 hours ending 03/10/23 1411    TELEMETRY: AF  Lab Results:  Results from last 7 days   Lab Units 03/07/23  0426   WBC Thousand/uL 11 05*   HEMOGLOBIN g/dL 12 8   HEMATOCRIT % 38 4   PLATELETS Thousands/uL 324     Results from last 7 days   Lab Units 03/07/23  0426 03/06/23  1830   POTASSIUM mmol/L 3 8 3 8   CHLORIDE mmol/L 107 105   CO2 mmol/L 23 22   BUN mg/dL 10 12   CREATININE mg/dL 0 54* 0 62   CALCIUM mg/dL 8 7 9 3   ALK PHOS U/L  --  72   ALT U/L  --  12   AST U/L  --  21     Results from last 7 days   Lab Units 03/07/23  0426   POTASSIUM mmol/L 3 8   CHLORIDE mmol/L 107   CO2 mmol/L 23   BUN mg/dL 10   CREATININE mg/dL 0 54*   CALCIUM mg/dL 8 7           Medications:    Current Facility-Administered Medications:   •  acetaminophen (TYLENOL) tablet 650 mg, 650 mg, Oral, Q6H PRN, Danielle Hernandez PA-C  •  aluminum-magnesium hydroxide-simethicone (MYLANTA) oral suspension 30 mL, 30 mL, Oral, Q6H PRN, Danielle Hernandez PA-C  •  aspirin (ECOTRIN LOW STRENGTH) EC tablet 81 mg, 81 mg, Oral, Daily, Randall Sanchez MD, 81 mg at 03/10/23 0830  •  atorvastatin (LIPITOR) tablet 40 mg, 40 mg, Oral, Daily With Cindy Hernandez PA-C, 40 mg at 03/09/23 1647  •  diltiazem (CARDIZEM) 125 mg in sodium chloride 0 9 % 125 mL infusion, 1-15 mg/hr, Intravenous, Titrated, Marta Ashford MD, Held at 03/10/23 0308  •  diltiazem (CARDIZEM) tablet 30 mg, 30 mg, Oral, Q6H [facility], Marta Ashford MD, 30 mg at 03/10/23 1154  •  enoxaparin (LOVENOX) subcutaneous injection 40 mg, 40 mg, Subcutaneous, Daily, Danielle Hernandez PA-C, 40 mg at 03/10/23 0830  •  hydrOXYzine HCL (ATARAX) tablet 25 mg, 25 mg, Oral, Q6H PRN, Danielle Hernandez PA-C  •  melatonin tablet 3 mg, 3 mg, Oral, HS PRN, Rivera Bryan PA-C, 3 mg at 03/09/23 2230  •  metoprolol (LOPRESSOR) injection 5 mg, 5 mg, Intravenous, Q4H PRN, Danielle Hernandez PA-C, 5 mg at 03/08/23 8689  •  metoprolol succinate (TOPROL-XL) 24 hr tablet 25 mg, 25 mg, Oral, Q12H [facility], Danielle Hernandez PA-C, 25 mg at 03/10/23 0830  •  nicotine (NICODERM CQ) 21 mg/24 hr TD 24 hr patch 1 patch, 1 patch, Transdermal, Daily, Danielle Hernandez PA-C, 1 patch at 03/10/23 0830  •  ondansetron (ZOFRAN) injection 4 mg, 4 mg, Intravenous, Q6H PRN, Danielle Hernandez PA-C  •  polyethylene glycol (MIRALAX) packet 17 g, 17 g, Oral, Daily, Danielle Hernandez PA-C    This note was completed in part utilizing M-Modal Fluency Direct Software  Grammatical errors, random word insertions, spelling mistakes, and incomplete sentences may be an occasional consequence of this system secondary to software limitations, ambient noise, and hardware issues  If you have any questions or concerns about the content, text, or information contained within the body of this dictation, please contact the provider for clarification

## 2023-03-10 NOTE — ASSESSMENT & PLAN NOTE
· 2 indeterminate lesions noted on CT scan abd/pelvis to rule out malignancy measuring 0 9 cm in the right and left kidneys which do not meet criteria for simple cyst   Further recommendation recommended with MRI    · MRI ordered and pending

## 2023-03-10 NOTE — ASSESSMENT & PLAN NOTE
· Atrial flutter with acute strokes  · Due to large size of strokes and risk for hemorrhagic conversion, anticoagulation should not be started yet  · Continue aspirin 81 mg daily  · Seen and evaluated by cardiology - f/u with cardiology in 2 weeks as the plan is to start anticoagulation then   · Echocardiogram with normal EF 54%, R&L atrium with mild dilation, mild tricuspid regurg  · Rates vary, controlled on exam, telemetry with rates 's  · Continue on metoprolol succinate 25 mg twice daily  · Currently on Cardizem drip with attempt to wean off and oral Cardizem 30 mg every 6 hours  · S/p digoxin 250 mcg every 6 hours x 3 doses to better control rates without affecting blood pressure  Hopes to transition to oral today    · Hopeful plan to start oral digoxin today, f/u cardiology recommendations

## 2023-03-11 VITALS
RESPIRATION RATE: 18 BRPM | OXYGEN SATURATION: 96 % | SYSTOLIC BLOOD PRESSURE: 104 MMHG | HEART RATE: 101 BPM | DIASTOLIC BLOOD PRESSURE: 67 MMHG | BODY MASS INDEX: 26.22 KG/M2 | TEMPERATURE: 98.3 F | WEIGHT: 153.6 LBS | HEIGHT: 64 IN

## 2023-03-11 PROBLEM — R93.429: Status: ACTIVE | Noted: 2023-03-09

## 2023-03-11 PROBLEM — G93.6 CEREBRAL EDEMA (HCC): Status: ACTIVE | Noted: 2023-03-11

## 2023-03-11 LAB
ANION GAP SERPL CALCULATED.3IONS-SCNC: 7 MMOL/L (ref 4–13)
BUN SERPL-MCNC: 12 MG/DL (ref 5–25)
CALCIUM SERPL-MCNC: 9.1 MG/DL (ref 8.4–10.2)
CHLORIDE SERPL-SCNC: 106 MMOL/L (ref 96–108)
CO2 SERPL-SCNC: 26 MMOL/L (ref 21–32)
CREAT SERPL-MCNC: 0.56 MG/DL (ref 0.6–1.3)
DIGOXIN SERPL-MCNC: 0.9 NG/ML (ref 0.8–2)
ERYTHROCYTE [DISTWIDTH] IN BLOOD BY AUTOMATED COUNT: 13.3 % (ref 11.6–15.1)
GFR SERPL CREATININE-BSD FRML MDRD: 96 ML/MIN/1.73SQ M
GLUCOSE SERPL-MCNC: 96 MG/DL (ref 65–140)
HCT VFR BLD AUTO: 40.4 % (ref 34.8–46.1)
HGB BLD-MCNC: 13.2 G/DL (ref 11.5–15.4)
MCH RBC QN AUTO: 28.9 PG (ref 26.8–34.3)
MCHC RBC AUTO-ENTMCNC: 32.7 G/DL (ref 31.4–37.4)
MCV RBC AUTO: 88 FL (ref 82–98)
PLATELET # BLD AUTO: 324 THOUSANDS/UL (ref 149–390)
PMV BLD AUTO: 9.9 FL (ref 8.9–12.7)
POTASSIUM SERPL-SCNC: 4 MMOL/L (ref 3.5–5.3)
RBC # BLD AUTO: 4.57 MILLION/UL (ref 3.81–5.12)
SODIUM SERPL-SCNC: 139 MMOL/L (ref 135–147)
WBC # BLD AUTO: 9.65 THOUSAND/UL (ref 4.31–10.16)

## 2023-03-11 RX ORDER — DILTIAZEM HYDROCHLORIDE 120 MG/1
120 TABLET, FILM COATED ORAL EVERY 6 HOURS SCHEDULED
Qty: 30 TABLET | Refills: 0 | Status: SHIPPED | OUTPATIENT
Start: 2023-03-11 | End: 2023-03-20 | Stop reason: SDUPTHER

## 2023-03-11 RX ORDER — WARFARIN SODIUM 5 MG/1
5 TABLET ORAL
Qty: 30 TABLET | Refills: 0 | Status: SHIPPED | OUTPATIENT
Start: 2023-03-19

## 2023-03-11 RX ORDER — ASPIRIN 81 MG/1
81 TABLET ORAL DAILY
Qty: 14 TABLET | Refills: 0 | Status: SHIPPED | OUTPATIENT
Start: 2023-03-12

## 2023-03-11 RX ORDER — NICOTINE 21 MG/24HR
1 PATCH, TRANSDERMAL 24 HOURS TRANSDERMAL DAILY
Qty: 28 PATCH | Refills: 0 | Status: SHIPPED | OUTPATIENT
Start: 2023-03-12

## 2023-03-11 RX ORDER — ATORVASTATIN CALCIUM 40 MG/1
40 TABLET, FILM COATED ORAL
Qty: 30 TABLET | Refills: 1 | Status: SHIPPED | OUTPATIENT
Start: 2023-03-11

## 2023-03-11 RX ORDER — METOPROLOL SUCCINATE 25 MG/1
25 TABLET, EXTENDED RELEASE ORAL EVERY 12 HOURS SCHEDULED
Qty: 60 TABLET | Refills: 0 | Status: SHIPPED | OUTPATIENT
Start: 2023-03-11

## 2023-03-11 RX ORDER — DIGOXIN 125 MCG
125 TABLET ORAL DAILY
Qty: 30 TABLET | Refills: 0 | Status: SHIPPED | OUTPATIENT
Start: 2023-03-12

## 2023-03-11 RX ADMIN — HYDROXYZINE HYDROCHLORIDE 25 MG: 25 TABLET ORAL at 09:25

## 2023-03-11 RX ADMIN — DILTIAZEM HYDROCHLORIDE 30 MG: 30 TABLET, FILM COATED ORAL at 12:42

## 2023-03-11 RX ADMIN — Medication 1 PATCH: at 08:12

## 2023-03-11 RX ADMIN — DILTIAZEM HYDROCHLORIDE 30 MG: 30 TABLET, FILM COATED ORAL at 05:38

## 2023-03-11 RX ADMIN — ASPIRIN 81 MG: 81 TABLET, COATED ORAL at 08:12

## 2023-03-11 RX ADMIN — ENOXAPARIN SODIUM 40 MG: 100 INJECTION SUBCUTANEOUS at 08:12

## 2023-03-11 RX ADMIN — DIGOXIN 125 MCG: 125 TABLET ORAL at 08:12

## 2023-03-11 NOTE — PLAN OF CARE
Problem: Potential for Falls  Goal: Patient will remain free of falls  Description: INTERVENTIONS:  - Educate patient/family on patient safety including physical limitations  - Instruct patient to call for assistance with activity   - Consult OT/PT to assist with strengthening/mobility   - Keep Call bell within reach  - Keep bed low and locked with side rails adjusted as appropriate  - Keep care items and personal belongings within reach  - Initiate and maintain comfort rounds  - Make Fall Risk Sign visible to staff  Problem: PAIN - ADULT  Goal: Verbalizes/displays adequate comfort level or baseline comfort level  Description: Interventions:  - Encourage patient to monitor pain and request assistance  - Assess pain using appropriate pain scale  - Administer analgesics based on type and severity of pain and evaluate response  - Implement non-pharmacological measures as appropriate and evaluate response  - Consider cultural and social influences on pain and pain management  - Notify physician/advanced practitioner if interventions unsuccessful or patient reports new pain  Outcome: Progressing     Problem: INFECTION - ADULT  Goal: Absence or prevention of progression during hospitalization  Description: INTERVENTIONS:  - Assess and monitor for signs and symptoms of infection  - Monitor lab/diagnostic results  - Monitor all insertion sites, i e  indwelling lines, tubes, and drains  - Monitor endotracheal if appropriate and nasal secretions for changes in amount and color  - [place] appropriate cooling/warming therapies per order  - Administer medications as ordered  - Instruct and encourage patient and family to use good hand hygiene technique  - Identify and instruct in appropriate isolation precautions for identified infection/condition  Outcome: Progressing  Goal: Absence of fever/infection during neutropenic period  Description: INTERVENTIONS:  - Monitor WBC    Outcome: Progressing     Problem: DISCHARGE PLANNING  Goal: Discharge to home or other facility with appropriate resources  Description: INTERVENTIONS:  - Identify barriers to discharge w/patient and caregiver  - Arrange for needed discharge resources and transportation as appropriate  - Identify discharge learning needs (meds, wound care, etc )  - Arrange for interpretive services to assist at discharge as needed  - Refer to Case Management Department for coordinating discharge planning if the patient needs post-hospital services based on physician/advanced practitioner order or complex needs related to functional status, cognitive ability, or social support system  Outcome: Progressing     Problem: Knowledge Deficit  Goal: Patient/family/caregiver demonstrates understanding of disease process, treatment plan, medications, and discharge instructions  Description: Complete learning assessment and assess knowledge base  Interventions:  - Provide teaching at level of understanding  - Provide teaching via preferred learning methods  Outcome: Progressing     Problem: Neurological Deficit  Goal: Neurological status is stable or improving  Description: Interventions:  - Monitor and assess patient's level of consciousness, motor function, sensory function, and level of assistance needed for ADLs  - Monitor and report changes from baseline  Collaborate with interdisciplinary team to initiate plan and implement interventions as ordered  - Provide and maintain a safe environment  - Consider seizure precautions  - Consider fall precautions  - Consider aspiration precautions  - Consider bleeding precautions    Outcome: Progressing     - Apply yellow socks and bracelet for high fall risk patients  - Consider moving patient to room near nurses station  Outcome: Progressing

## 2023-03-11 NOTE — ASSESSMENT & PLAN NOTE
· MRI with bilateral kidney cysts and indeterminate left lower pole lesion    · Clinically the patient has no symptoms or signs of urinary tract infection  · Ambulatory referral was placed for urology  · Discussed findings with daughter Marcos Sevilla

## 2023-03-11 NOTE — PROGRESS NOTES
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 ([facility] [address] )  -CTA head and neck 3/6/2023 showed occlusion of distal P2 segment of the left posterior cerebral artery  - Brain MRI 3/7/2023 showed large acute/subacute left PCA territory infarct with associated edema without significant mass effect or hemorrhagic transformation 2 tiny acute/subacute cortical infarcts in the posterior left frontal lobe  -Neurology following  - Appears to be cardioembolic in the setting of new onset atrial fibrillation  -On aspirin 81 mg daily and atorvastatin 40 mg daily    New onset a-fib ([facility] [address] )  -Plan to start Eliquis 5 mg twice daily 2 weeks post stroke on 3/19/2023, will discontinue aspirin at that time  -Rate control with Toprol-XL 25 mg twice daily, Cardizem 30 mg every 6 hours and Cardizem drip  -TTE 3/7/2023 showed EF 54%, mildly dilated RV, mild LA and RA, mild TR    Hypercholesterolemia  - Continue with atorvastatin 40 mg daily    Tobacco abuse  - Encourage smoking cessation    Migraine with aura and without status migrainosus, not intractable        · Digoxin level normal, continue 0 125 mg p o  daily  · Continue metoprolol diltiazem    We will hold off on increasing dose to avoid hypotension and cerebral hypoperfusion  · Heart rate seems controlled for the most part with occasional spikes which may relate to ambulation  · Start Eliquis when cleared by neurology  · Continue telemetry to watch heart rate trend                Subjective: Patient seen and examined, no complaints                Physical Exam:  GEN:  NAD  HEENT:  MMM, NCAT, pink conjunctiva, EOMI, nonicteric sclera  CV:  NO JVD/HJR, irregularly irregular, tachycardic, NO M/R/G, +S1/S2, NO PARASTERNAL HEAVE/THRILL, NO LE EDEMA, NO HEPATIC SYSTOLIC PULSATION, WARM EXTREMITIES  RESP:  CTAB/L  ABD:  SOFT, NT, NO GROSS ORGANOMEGALY        Vitals:   /66 (BP Location: Right arm)   Pulse 95   Temp 98 3 °F (36 8 °C) (Temporal)   Resp 18   Ht 5' 4" (1 626 m)   Wt 69 7 kg (153 lb 9 6 oz)   SpO2 96%   BMI 26 37 kg/m²   Vitals:    03/10/23 0536 03/11/23 0550   Weight: 69 3 kg (152 lb 12 8 oz) 69 7 kg (153 lb 9 6 oz)     No intake or output data in the 24 hours ending 03/11/23 0857    TELEMETRY: Atrial fibrillation, intermittent RVR  Lab Results:  Results from last 7 days   Lab Units 03/11/23  0541   WBC Thousand/uL 9 65   HEMOGLOBIN g/dL 13 2   HEMATOCRIT % 40 4   PLATELETS Thousands/uL 324     Results from last 7 days   Lab Units 03/11/23  0541 03/07/23  0426 03/06/23  1830   POTASSIUM mmol/L 4 0   < > 3 8   CHLORIDE mmol/L 106   < > 105   CO2 mmol/L 26   < > 22   BUN mg/dL 12   < > 12   CREATININE mg/dL 0 56*   < > 0 62   CALCIUM mg/dL 9 1   < > 9 3   ALK PHOS U/L  --   --  72   ALT U/L  --   --  12   AST U/L  --   --  21    < > = values in this interval not displayed       Results from last 7 days   Lab Units 03/11/23  0541   POTASSIUM mmol/L 4 0   CHLORIDE mmol/L 106   CO2 mmol/L 26   BUN mg/dL 12   CREATININE mg/dL 0 56*   CALCIUM mg/dL 9 1           Medications:    Current Facility-Administered Medications:   •  acetaminophen (TYLENOL) tablet 650 mg, 650 mg, Oral, Q6H PRN, Danielle Hernandez PA-C  •  aluminum-magnesium hydroxide-simethicone (MYLANTA) oral suspension 30 mL, 30 mL, Oral, Q6H PRN, Danielle Hernandez PA-C  •  aspirin (ECOTRIN LOW STRENGTH) EC tablet 81 mg, 81 mg, Oral, Daily, Julien Koch MD, 81 mg at 03/11/23 7830  •  atorvastatin (LIPITOR) tablet 40 mg, 40 mg, Oral, Daily With Romaine Hernandez PA-C, 40 mg at 03/10/23 1825  •  digoxin (LANOXIN) tablet 125 mcg, 125 mcg, Oral, Daily, Lauro Cisneros DO, 125 mcg at 03/11/23 4246  •  diltiazem (CARDIZEM) tablet 30 mg, 30 mg, Oral, Q6H [facility], Kathy Rowan MD, 30 mg at 03/11/23 0538  •  enoxaparin (LOVENOX) subcutaneous injection 40 mg, 40 mg, Subcutaneous, Daily, Danielle Hernandez PA-C, 40 mg at 03/11/23 7911  •  hydrOXYzine HCL (ATARAX) tablet 25 mg, 25 mg, Oral, Q6H PRN, Danielle Hernandez PA-C  •  melatonin tablet 3 mg, 3 mg, Oral, HS PRN, Danielle Hernandez PA-C, 3 mg at 03/10/23 2140  •  metoprolol (LOPRESSOR) injection 5 mg, 5 mg, Intravenous, Q4H PRN, Danielle Hernandez PA-C, 5 mg at 03/08/23 4900  •  metoprolol succinate (TOPROL-XL) 24 hr tablet 25 mg, 25 mg, Oral, Q12H [facility], Danielle Hernandez PA-C, 25 mg at 03/10/23 0830  •  nicotine (NICODERM CQ) 21 mg/24 hr TD 24 hr patch 1 patch, 1 patch, Transdermal, Daily, Danielle Hernandez PA-C, 1 patch at 03/11/23 8445  •  ondansetron (ZOFRAN) injection 4 mg, 4 mg, Intravenous, Q6H PRN, Danielle Hernandez PA-C  •  polyethylene glycol (MIRALAX) packet 17 g, 17 g, Oral, Daily, Danielle Hernandez PA-C    This note was completed in part utilizing [employer] Direct Software  Grammatical errors, random word insertions, spelling mistakes, and incomplete sentences may be an occasional consequence of this system secondary to software limitations, ambient noise, and hardware issues  If you have any questions or concerns about the content, text, or information contained within the body of this dictation, please contact the provider for clarification

## 2023-03-11 NOTE — ASSESSMENT & PLAN NOTE
· Anticoagulation not initiated yet due to discussed hemorrhagic transformation of her stroke  · She has no insurance coverage for NOAC  · Plan coordinated with cardiology, neurology, case management today    · She will continue baby aspirin until her INR is more than 2  · Starting 3/19/2023, she will start Coumadin 5 mg daily  · She will be followed by the Coumadin clinic  · She will be discharged on metoprolol 25 mg twice daily and Cardizem 120 mg daily  · Ambulatory referral placed

## 2023-03-11 NOTE — PLAN OF CARE
Problem: Potential for Falls  Goal: Patient will remain free of falls  Description: INTERVENTIONS:  - Educate patient/family on patient safety including physical limitations  - Instruct patient to call for assistance with activity   - Consult OT/PT to assist with strengthening/mobility   - Keep Call bell within reach  - Keep bed low and locked with side rails adjusted as appropriate  - Keep care items and personal belongings within reach  - Initiate and maintain comfort rounds  - Make Fall Risk Sign visible to staff  - Offer Toileting every  Hours, in advance of need  - Initiate/Maintain alarm  - Obtain necessary fall risk management equipment:   - Apply yellow socks and bracelet for high fall risk patients  - Consider moving patient to room near nurses station  3/11/2023 1223 by Leanna Calderon RN  Outcome: Adequate for Discharge  3/11/2023 1048 by Leanna Calderon RN  Outcome: Progressing     Problem: PAIN - ADULT  Goal: Verbalizes/displays adequate comfort level or baseline comfort level  Description: Interventions:  - Encourage patient to monitor pain and request assistance  - Assess pain using appropriate pain scale  - Administer analgesics based on type and severity of pain and evaluate response  - Implement non-pharmacological measures as appropriate and evaluate response  - Consider cultural and social influences on pain and pain management  - Notify physician/advanced practitioner if interventions unsuccessful or patient reports new pain  3/11/2023 1223 by Leanna Calderon RN  Outcome: Adequate for Discharge  3/11/2023 1048 by Leanna Calderon RN  Outcome: Progressing     Problem: INFECTION - ADULT  Goal: Absence or prevention of progression during hospitalization  Description: INTERVENTIONS:  - Assess and monitor for signs and symptoms of infection  - Monitor lab/diagnostic results  - Monitor all insertion sites, i e  indwelling lines, tubes, and drains  - Monitor endotracheal if appropriate and nasal secretions for changes in amount and color  - [place] appropriate cooling/warming therapies per order  - Administer medications as ordered  - Instruct and encourage patient and family to use good hand hygiene technique  - Identify and instruct in appropriate isolation precautions for identified infection/condition  3/11/2023 1223 by José Miguel Miller RN  Outcome: Adequate for Discharge  3/11/2023 1048 by José Miguel Miller RN  Outcome: Progressing  Goal: Absence of fever/infection during neutropenic period  Description: INTERVENTIONS:  - Monitor WBC    3/11/2023 1223 by José Miguel Miller RN  Outcome: Adequate for Discharge  3/11/2023 1048 by José Miguel Miller RN  Outcome: Progressing     Problem: SAFETY ADULT  Goal: Patient will remain free of falls  Description: INTERVENTIONS:  - Educate patient/family on patient safety including physical limitations  - Instruct patient to call for assistance with activity   - Consult OT/PT to assist with strengthening/mobility   - Keep Call bell within reach  - Keep bed low and locked with side rails adjusted as appropriate  - Keep care items and personal belongings within reach  - Initiate and maintain comfort rounds  - Make Fall Risk Sign visible to staff  - Offer Toileting every  Hours, in advance of need  - Initiate/Maintain alarm  - Obtain necessary fall risk management equipment:   - Apply yellow socks and bracelet for high fall risk patients  - Consider moving patient to room near nurses station  3/11/2023 1223 by José Miguel Miller RN  Outcome: Adequate for Discharge  3/11/2023 1048 by José Miguel Miller RN  Outcome: Progressing  Goal: Maintain or return to baseline ADL function  Description: INTERVENTIONS:  -  Assess patient's ability to carry out ADLs; assess patient's baseline for ADL function and identify physical deficits which impact ability to perform ADLs (bathing, care of mouth/teeth, toileting, grooming, dressing, etc )  - Assess/evaluate cause of self-care deficits   - Assess range of motion  - Assess patient's mobility; develop plan if impaired  - Assess patient's need for assistive devices and provide as appropriate  - Encourage maximum independence but intervene and supervise when necessary  - Involve family in performance of ADLs  - Assess for home care needs following discharge   - Consider OT consult to assist with ADL evaluation and planning for discharge  - Provide patient education as appropriate  3/11/2023 1223 by José Miguel Miller RN  Outcome: Adequate for Discharge  3/11/2023 1048 by José Miguel Miller RN  Outcome: Progressing  Goal: Maintains/Returns to pre admission functional level  Description: INTERVENTIONS:  - Perform BMAT or MOVE assessment daily    - Set and communicate daily mobility goal to care team and patient/family/caregiver  - Collaborate with rehabilitation services on mobility goals if consulted  - Perform Range of Motion  times a day  - Reposition patient every  hours    - Dangle patient  times a day  - Stand patient  times a day  - Ambulate patient  times a day  - Out of bed to chair  times a day   - Out of bed for meals times a day  - Out of bed for toileting  - Record patient progress and toleration of activity level   3/11/2023 1223 by José Miguel Miller RN  Outcome: Adequate for Discharge  3/11/2023 1048 by José Miguel Miller RN  Outcome: Progressing     Problem: DISCHARGE PLANNING  Goal: Discharge to home or other facility with appropriate resources  Description: INTERVENTIONS:  - Identify barriers to discharge w/patient and caregiver  - Arrange for needed discharge resources and transportation as appropriate  - Identify discharge learning needs (meds, wound care, etc )  - Arrange for interpretive services to assist at discharge as needed  - Refer to Case Management Department for coordinating discharge planning if the patient needs post-hospital services based on physician/advanced practitioner order or complex needs related to functional status, cognitive ability, or social support system  3/11/2023 1223 by Olga Otero RN  Outcome: Adequate for Discharge  3/11/2023 1048 by Olga Otero RN  Outcome: Progressing     Problem: Knowledge Deficit  Goal: Patient/family/caregiver demonstrates understanding of disease process, treatment plan, medications, and discharge instructions  Description: Complete learning assessment and assess knowledge base  Interventions:  - Provide teaching at level of understanding  - Provide teaching via preferred learning methods  3/11/2023 1223 by Olga Otero RN  Outcome: Adequate for Discharge  3/11/2023 1048 by Olga Otero RN  Outcome: Progressing     Problem: Neurological Deficit  Goal: Neurological status is stable or improving  Description: Interventions:  - Monitor and assess patient's level of consciousness, motor function, sensory function, and level of assistance needed for ADLs  - Monitor and report changes from baseline  Collaborate with interdisciplinary team to initiate plan and implement interventions as ordered  - Provide and maintain a safe environment  - Consider seizure precautions  - Consider fall precautions  - Consider aspiration precautions  - Consider bleeding precautions  3/11/2023 1223 by Olga Otero RN  Outcome: Adequate for Discharge  3/11/2023 1048 by Olga Otero RN  Outcome: Progressing     Problem: Activity Intolerance/Impaired Mobility  Goal: Mobility/activity is maintained at optimum level for patient  Description: Interventions:  - Assess and monitor patient  barriers to mobility and need for assistive/adaptive devices  - Assess patient's emotional response to limitations  - Collaborate with interdisciplinary team and initiate plans and interventions as ordered    - Encourage independent activity per ability   - Maintain proper body alignment  - Perform active/passive rom as tolerated/ordered  - Plan activities to conserve energy   - Turn patient as appropriate  3/11/2023 1223 by Leanna Calderon RN  Outcome: Adequate for Discharge  3/11/2023 1048 by Leanna Calderon RN  Outcome: Progressing     Problem: Communication Impairment  Goal: Ability to express needs and understand communication  Description: Assess patient's communication skills and ability to understand information  Patient will demonstrate use of effective communication techniques, alternative methods of communication and understanding even if not able to speak  - Encourage communication and provide alternate methods of communication as needed  - Collaborate with case management/ for discharge needs  - Include patient/family/caregiver in decisions related to communication  3/11/2023 1223 by Leanna Calderon RN  Outcome: Adequate for Discharge  3/11/2023 1048 by Leanna Caldeorn RN  Outcome: Progressing     Problem: Potential for Aspiration  Goal: Non-ventilated patient's risk of aspiration is minimized  Description: Assess and monitor vital signs, respiratory status, and labs (WBC)  Monitor for signs of aspiration (tachypnea, cough, rales, wheezing, cyanosis, fever)  - Assess and monitor patient's ability to swallow  - Place patient up in chair to eat if possible  - HOB up at 90 degrees to eat if unable to get patient up into chair   - Supervise patient during oral intake  - Instruct patient/ family to take small bites  - Instruct patient/ family to take small single sips when taking liquids    - Follow patient-specific strategies generated by speech pathologist   3/11/2023 1223 by Leanna Calderon RN  Outcome: Adequate for Discharge  3/11/2023 1048 by Leanna Calderon RN  Outcome: Progressing  Goal: Ventilated patient's risk of aspiration is minimized  Description: Assess and monitor vital signs, respiratory status, airway cuff pressure, and labs (WBC)  Monitor for signs of aspiration (tachypnea, cough, rales, wheezing, cyanosis, fever)  - Elevate head of bed 30 degrees if patient has tube feeding   - Monitor tube feeding  3/11/2023 1223 by Dixie Hernandez RN  Outcome: Adequate for Discharge  3/11/2023 1048 by Dixie Hernandez RN  Outcome: Progressing     Problem: Nutrition  Goal: Nutrition/Hydration status is improving  Description: Monitor and assess patient's nutrition/hydration status for malnutrition (ex- brittle hair, bruises, dry skin, pale skin and conjunctiva, muscle wasting, smooth red tongue, and disorientation)  Collaborate with interdisciplinary team and initiate plan and interventions as ordered  Monitor patient's weight and dietary intake as ordered or per policy  Utilize nutrition screening tool and intervene per policy  Determine patient's food preferences and provide high-protein, high-caloric foods as appropriate  - Assist patient with eating   - Allow adequate time for meals   - Encourage patient to take dietary supplement as ordered  - Collaborate with clinical nutritionist   - Include patient/family/caregiver in decisions related to nutrition    3/11/2023 1223 by Dixie Hernandez RN  Outcome: Adequate for Discharge  3/11/2023 1048 by Dixie Hernandez RN  Outcome: Progressing     Problem: NEUROSENSORY - ADULT  Goal: Achieves stable or improved neurological status  Description: INTERVENTIONS  - Monitor and report changes in neurological status  - Monitor vital signs such as temperature, blood pressure, glucose, and any other labs ordered   - Initiate measures to prevent increased intracranial pressure  - Monitor for seizure activity and implement precautions if appropriate      3/11/2023 1223 by Dixie Hernandez RN  Outcome: Adequate for Discharge  3/11/2023 1048 by Dixie Hernandez RN  Outcome: Progressing  Goal: Remains free of injury related to seizures activity  Description: INTERVENTIONS  - Maintain airway, patient safety  and administer oxygen as ordered  - Monitor patient for seizure activity, document and report duration and description of seizure to physician/advanced practitioner  - If seizure occurs,  ensure patient safety during seizure  - Reorient patient post seizure  - Seizure pads on all 4 side rails  - Instruct patient/family to notify RN of any seizure activity including if an aura is experienced  - Instruct patient/family to call for assistance with activity based on nursing assessment  - Administer anti-seizure medications if ordered    3/11/2023 1223 by Lizette Rai RN  Outcome: Adequate for Discharge  3/11/2023 1048 by Lizette Rai RN  Outcome: Progressing  Goal: Achieves maximal functionality and self care  Description: INTERVENTIONS  - Monitor swallowing and airway patency with patient fatigue and changes in neurological status  - Encourage and assist patient to increase activity and self care     - Encourage visually impaired, hearing impaired and aphasic patients to use assistive/communication devices  3/11/2023 1223 by Lizette Rai RN  Outcome: Adequate for Discharge  3/11/2023 1048 by Lizette Rai RN  Outcome: Progressing     Problem: CARDIOVASCULAR - ADULT  Goal: Maintains optimal cardiac output and hemodynamic stability  Description: INTERVENTIONS:  - Monitor I/O, vital signs and rhythm  - Monitor for S/S and trends of decreased cardiac output  - Administer and titrate ordered vasoactive medications to optimize hemodynamic stability  - Assess quality of pulses, skin color and temperature  - Assess for signs of decreased coronary artery perfusion  - Instruct patient to report change in severity of symptoms  3/11/2023 1223 by Lizette Rai RN  Outcome: Adequate for Discharge  3/11/2023 1048 by Lizette Rai RN  Outcome: Progressing  Goal: Absence of cardiac dysrhythmias or at baseline rhythm  Description: INTERVENTIONS:  - Continuous cardiac monitoring, vital signs, obtain 12 lead EKG if ordered  - Administer antiarrhythmic and heart rate control medications as ordered  - Monitor electrolytes and administer replacement therapy as ordered  3/11/2023 1223 by Paula Braxton RN  Outcome: Adequate for Discharge  3/11/2023 1048 by Paula Braxton RN  Outcome: Progressing

## 2023-03-11 NOTE — DISCHARGE SUMMARY
[address] [facility]  Discharge- Chance Wright 1955, 80 y o  female MRN: [id number]  Unit/Bed#: E4 -01 Encounter: [id number]  Primary Care Provider: No primary care provider on file  Date and time admitted to hospital: 3/6/2023  5:51 PM    * Cerebrovascular accident (CVA) due to embolism of cerebral artery ([facility] [address] )  Assessment & Plan  • Stroke related to newly diagnosed atrial fibrillation  • Continue aspirin and Lipitor  • On 3/19/2023, start Coumadin 5 mg daily  • Once INR is more than 2, stop aspirin  • The patient is referred to the [place] [address] cardiology [place] [facility] clinic to help with her Coumadin therapy  • Ambulatory referral was  ordered on discharge for neurology, outpatient PT, outpatient OT and cardiology    Cerebral edema ([facility] )  Assessment & Plan  · Due to acute stroke without mass effect  · Stable    New onset a-fib ([facility] [address] )  Assessment & Plan  · Anticoagulation not initiated yet due to discussed hemorrhagic transformation of her stroke  · She has no insurance coverage for NOAC  · Plan coordinated with cardiology, neurology, case management today  · She will continue baby aspirin until her INR is more than 2  · Starting 3/19/2023, she will start Coumadin 5 mg daily  · She will be followed by the Coumadin clinic  · She will be discharged on metoprolol 25 mg twice daily and Cardizem 120 mg daily  · Ambulatory referral placed    Abnormal magnetic resonance imaging of kidney  Assessment & Plan  · MRI with bilateral kidney cysts and indeterminate left lower pole lesion    · Clinically the patient has no symptoms or signs of urinary tract infection  · Ambulatory referral was placed for urology  · Discussed findings with daughter Margarette Peguero    Hypercholesterolemia  Assessment & Plan  · Continue Lipitor 40 mg daily    Tobacco abuse  Assessment & Plan  · Continue nicotine patch        Medical Problems     Resolved Problems  Date Reviewed: 3/11/2023   None       Discharging Physician / Practitioner: Sulema Ross MD  PCP: No primary care provider on file  Admission Date:   Admission Orders (From admission, onward)     Ordered        03/06/23 1944  INPATIENT ADMISSION  Once                      Discharge Date: 03/11/23    Consultations During Hospital Stay:  · Neurology  · Cardiology    Procedures Performed:   · None    Significant Findings / Test Results:   CTA head and neck w wo contrast  Result Date: 3/6/2023  Impression: Occlusion distal P2 segment of the left posterior cerebral artery  Increased size of low-attenuation region in the left occipital lobe, consistent with evolving acute infarction  Recommend MRI when clinically appropriate for complete evaluation  No acute intracranial hemorrhage  CT head without contrast  Result Date: 3/5/2023  Impression: Ill-defined hypoattenuation in the left occipital lobe measuring 2 6 x 2 4 cm, new from August 2010, which may be related to a focal brain lesion or age-indeterminate infarct  Further evaluation with contrast-enhanced MRI is recommended  MRI brain w wo contrast  Result Date: 3/7/2023  Impression: 1  Large acute/subacute left PCA territory infarct  Associated edema without significant mass effect or hemorrhagic transformation  2   Two tiny acute/subacute cortical infarcts in the posterior left frontal lobe  3   Small right parietal cortical gliosis, suggesting sequela of remote infarct  CT chest abdomen pelvis w contrast  Result Date: 3/8/2023  Impression: There are two indeterminate lesions measuring 0 9 cm in the right and left kidneys, which do not meet criteria for simple cysts  Recommend further evaluation with MRI  Biatrial enlargement  MRI abdomen w wo contrast and mrcp  Result Date: 3/11/2023  Impression: Multiple typical cysts are seen in the kidneys   The area in the lower pole the right kidney is fairly well-circumscribed persisting on delayed images are more likely represents a tiny complex cyst   This measures approximately 8 mm  In the left kidney, lesion adjacent to the spleen demonstrates more ill-defined margins but persists on delayed images measuring approximately 8 mm correlating with CT  A tiny area of focal pyelonephritis is possible  Infarct is less likely given only minimal diminished enhancement  Small neoplasm is still difficult to exclude  This should be correlated with any clinical evidence of urinary tract infection  Urologic follow-up is advised  Interval follow-up study is suggested in 3 months time  Gallstone     Incidental Findings:   · See above (kidney lesion and gallstone)     Test Results Pending at Discharge (will require follow up): · None     Outpatient Tests Requested:  · Outpatient INR through the Coumadin clinic    Complications:  none    Reason for Admission: Headache    Hospital Course:   Benigno Breen is a 80 y o  female patient who originally presented to the hospital on 3/6/2023 due to headache  She was found to have embolic strokes and atrial fibrillation  She completed the stroke work-up and additional imaging for determining kidney lesion on CT  She was seen by neurology, PT OT and case management  Due to her multiple strokes and risk for hemorrhagic conversion, she will continue baby aspirin until  her Coumadin is therapeutic  She  did well from a PT OT standpoint and only needs outpatient rehab  She was reported to [facility] due to her visual field deficit and she was advised not to drive or operate machinery at this time  This was also relayed to her daughter Rachel Sweeney  Discussed with Dr Adeola Rivera today regarding her medication and plan  She was seen by cardiology regarding her atrial fibrillation  She wasplaced on metoprolol 25 mg twice daily and Cardizem  120 mg daily for rate control  She has no insurance coverage for NOAC so  she will be started on Coumadin on 3/19/2023 and will follow with the Coumadin clinic    Once her INR is more than 2, she will discontinue her baby aspirin  Discussed with Dr Yvonne Caballero today regarding her medications and plan  Due to her  long-term tobacco use, neurology wanted a CT of the chest abdomen and pelvis to rule out underlying malignancy  CAT scan showed determinant kidney lesions so follow-up MRI was obtained  MRI showed  multiple cysts on both kidneys  There was a complex cyst on the right kidney which corresponded to the CAT scan finding  In the left kidney, the lesion was less defined so a follow-up imaging in 3 months and urology follow-up was recommended  Initially the patient did not have any  symptoms or signs of urinary tract infection  Ambulatory referral was placed for urology  Please see above list of diagnoses and related plan for additional information  Condition at Discharge: good    Discharge Day Visit / Exam:   Subjective: Seen and examined during rounds  She was able to walk to and from the bathroom by herself  Main complaint was  persistentproblem on the right field of vision  Vitals: Blood Pressure: 105/66 (03/11/23 0735)  Pulse: 95 (03/11/23 0735)  Temperature: 98 3 °F (36 8 °C) (03/11/23 0735)  Temp Source: Temporal (03/11/23 0735)  Respirations: 18 (03/11/23 0735)  Height: 5' 4" (162 6 cm) (03/07/23 0830)  Weight - Scale: 69 7 kg (153 lb 9 6 oz) (03/11/23 0550)  SpO2: 96 % (03/11/23 0735)  Exam:   Physical Exam  Vitals reviewed  Constitutional:       Appearance: She is not ill-appearing  HENT:      Head: Normocephalic and atraumatic  Nose: No congestion or rhinorrhea  Eyes:      General: No scleral icterus  Cardiovascular:      Rate and Rhythm: Normal rate  Rhythm irregular  Pulmonary:      Breath sounds: No wheezing or rhonchi  Abdominal:      General: There is no distension  Palpations: Abdomen is soft  Tenderness: There is no abdominal tenderness  Musculoskeletal:      Cervical back: Neck supple  Right lower leg: No edema  Left lower leg: No edema  Skin:     General: Skin is warm and dry  Coloration: Skin is not jaundiced or pale  Neurological:      Comments: Awake alert fluent speech   Psychiatric:         Behavior: Behavior normal         Discussion with Family: Updated  (daughter) at bedside  Discharge instructions/Information to patient and family:   See after visit summary for information provided to patient and family  Provisions for Follow-Up Care:  See after visit summary for information related to follow-up care and any pertinent home health orders  Disposition:   Home    Planned Readmission: no     Discharge Statement:  I spent >45 minutes discharging the patient  This time was spent on the day of discharge  I had direct contact with the patient on the day of discharge  Greater than 50% of the total time was spent examining patient, answering all patient questions, arranging and discussing plan of care with patient as well as directly providing post-discharge instructions  Additional time then spent on discharge activities  Discharge Medications:  See after visit summary for reconciled discharge medications provided to patient and/or family        **Please Note: This note may have been constructed using a voice recognition system**

## 2023-03-11 NOTE — ASSESSMENT & PLAN NOTE
• Stroke related to newly diagnosed atrial fibrillation  • Continue aspirin and Lipitor  • On 3/19/2023, start Coumadin 5 mg daily  • Once INR is more than 2, stop aspirin  • The patient is referred to the [place] [address] cardiology [facility] Coumadin clinic to help with her Coumadin therapy  • Ambulatory referral was  ordered on discharge for neurology, outpatient PT, outpatient OT and cardiology

## 2023-03-11 NOTE — PLAN OF CARE
Problem: Potential for Falls  Goal: Patient will remain free of falls  Description: INTERVENTIONS:  - Educate patient/family on patient safety including physical limitations  - Instruct patient to call for assistance with activity   - Consult OT/PT to assist with strengthening/mobility   - Keep Call bell within reach  - Keep bed low and locked with side rails adjusted as appropriate  - Keep care items and personal belongings within reach  - Initiate and maintain comfort rounds  - Make Fall Risk Sign visible to staff  - Offer Toileting every  Hours, in advance of need  - Initiate/Maintain alarm  - Obtain necessary fall risk management equipment:   - Apply yellow socks and bracelet for high fall risk patients  - Consider moving patient to room near nurses station  Outcome: Progressing     Problem: PAIN - ADULT  Goal: Verbalizes/displays adequate comfort level or baseline comfort level  Description: Interventions:  - Encourage patient to monitor pain and request assistance  - Assess pain using appropriate pain scale  - Administer analgesics based on type and severity of pain and evaluate response  - Implement non-pharmacological measures as appropriate and evaluate response  - Consider cultural and social influences on pain and pain management  - Notify physician/advanced practitioner if interventions unsuccessful or patient reports new pain  Outcome: Progressing     Problem: INFECTION - ADULT  Goal: Absence or prevention of progression during hospitalization  Description: INTERVENTIONS:  - Assess and monitor for signs and symptoms of infection  - Monitor lab/diagnostic results  - Monitor all insertion sites, i e  indwelling lines, tubes, and drains  - Monitor endotracheal if appropriate and nasal secretions for changes in amount and color  - Dum[place] appropriate cooling/warming therapies per order  - Administer medications as ordered  - Instruct and encourage patient and family to use good hand hygiene technique  - Identify and instruct in appropriate isolation precautions for identified infection/condition  Outcome: Progressing  Goal: Absence of fever/infection during neutropenic period  Description: INTERVENTIONS:  - Monitor WBC    Outcome: Progressing     Problem: SAFETY ADULT  Goal: Patient will remain free of falls  Description: INTERVENTIONS:  - Educate patient/family on patient safety including physical limitations  - Instruct patient to call for assistance with activity   - Consult OT/PT to assist with strengthening/mobility   - Keep Call bell within reach  - Keep bed low and locked with side rails adjusted as appropriate  - Keep care items and personal belongings within reach  - Initiate and maintain comfort rounds  - Make Fall Risk Sign visible to staff  - Offer Toileting every  Hours, in advance of need  - Initiate/Maintain alarm  - Obtain necessary fall risk management equipment:   - Apply yellow socks and bracelet for high fall risk patients  - Consider moving patient to room near nurses station  Outcome: Progressing  Goal: Maintain or return to baseline ADL function  Description: INTERVENTIONS:  -  Assess patient's ability to carry out ADLs; assess patient's baseline for ADL function and identify physical deficits which impact ability to perform ADLs (bathing, care of mouth/teeth, toileting, grooming, dressing, etc )  - Assess/evaluate cause of self-care deficits   - Assess range of motion  - Assess patient's mobility; develop plan if impaired  - Assess patient's need for assistive devices and provide as appropriate  - Encourage maximum independence but intervene and supervise when necessary  - Involve family in performance of ADLs  - Assess for home care needs following discharge   - Consider OT consult to assist with ADL evaluation and planning for discharge  - Provide patient education as appropriate  Outcome: Progressing  Goal: Maintains/Returns to pre admission functional level  Description: INTERVENTIONS:  - Perform BMAT or MOVE assessment daily    - Set and communicate daily mobility goal to care team and patient/family/caregiver  - Collaborate with rehabilitation services on mobility goals if consulted  - Perform Range of Motion  times a day  - Reposition patient every  hours  - Dangle patient  times a day  - Stand patient  times a day  - Ambulate patient  times a day  - Out of bed to chair  times a day   - Out of bed for meals times a day  - Out of bed for toileting  - Record patient progress and toleration of activity level   Outcome: Progressing     Problem: DISCHARGE PLANNING  Goal: Discharge to home or other facility with appropriate resources  Description: INTERVENTIONS:  - Identify barriers to discharge w/patient and caregiver  - Arrange for needed discharge resources and transportation as appropriate  - Identify discharge learning needs (meds, wound care, etc )  - Arrange for interpretive services to assist at discharge as needed  - Refer to Case Management Department for coordinating discharge planning if the patient needs post-hospital services based on physician/advanced practitioner order or complex needs related to functional status, cognitive ability, or social support system  Outcome: Progressing     Problem: Knowledge Deficit  Goal: Patient/family/caregiver demonstrates understanding of disease process, treatment plan, medications, and discharge instructions  Description: Complete learning assessment and assess knowledge base  Interventions:  - Provide teaching at level of understanding  - Provide teaching via preferred learning methods  Outcome: Progressing     Problem: Neurological Deficit  Goal: Neurological status is stable or improving  Description: Interventions:  - Monitor and assess patient's level of consciousness, motor function, sensory function, and level of assistance needed for ADLs  - Monitor and report changes from baseline   Collaborate with interdisciplinary team to initiate plan and implement interventions as ordered  - Provide and maintain a safe environment  - Consider seizure precautions  - Consider fall precautions  - Consider aspiration precautions  - Consider bleeding precautions  Outcome: Progressing     Problem: Activity Intolerance/Impaired Mobility  Goal: Mobility/activity is maintained at optimum level for patient  Description: Interventions:  - Assess and monitor patient  barriers to mobility and need for assistive/adaptive devices  - Assess patient's emotional response to limitations  - Collaborate with interdisciplinary team and initiate plans and interventions as ordered  - Encourage independent activity per ability   - Maintain proper body alignment  - Perform active/passive rom as tolerated/ordered  - Plan activities to conserve energy   - Turn patient as appropriate  Outcome: Progressing     Problem: Communication Impairment  Goal: Ability to express needs and understand communication  Description: Assess patient's communication skills and ability to understand information  Patient will demonstrate use of effective communication techniques, alternative methods of communication and understanding even if not able to speak  - Encourage communication and provide alternate methods of communication as needed  - Collaborate with case management/ for discharge needs  - Include patient/family/caregiver in decisions related to communication  Outcome: Progressing     Problem: Potential for Aspiration  Goal: Non-ventilated patient's risk of aspiration is minimized  Description: Assess and monitor vital signs, respiratory status, and labs (WBC)  Monitor for signs of aspiration (tachypnea, cough, rales, wheezing, cyanosis, fever)  - Assess and monitor patient's ability to swallow  - Place patient up in chair to eat if possible    - HOB up at 90 degrees to eat if unable to get patient up into chair   - Supervise patient during oral intake  - Instruct patient/ family to take small bites  - Instruct patient/ family to take small single sips when taking liquids  - Follow patient-specific strategies generated by speech pathologist   Outcome: Progressing  Goal: Ventilated patient's risk of aspiration is minimized  Description: Assess and monitor vital signs, respiratory status, airway cuff pressure, and labs (WBC)  Monitor for signs of aspiration (tachypnea, cough, rales, wheezing, cyanosis, fever)  - Elevate head of bed 30 degrees if patient has tube feeding   - Monitor tube feeding  Outcome: Progressing     Problem: Nutrition  Goal: Nutrition/Hydration status is improving  Description: Monitor and assess patient's nutrition/hydration status for malnutrition (ex- brittle hair, bruises, dry skin, pale skin and conjunctiva, muscle wasting, smooth red tongue, and disorientation)  Collaborate with interdisciplinary team and initiate plan and interventions as ordered  Monitor patient's weight and dietary intake as ordered or per policy  Utilize nutrition screening tool and intervene per policy  Determine patient's food preferences and provide high-protein, high-caloric foods as appropriate  - Assist patient with eating   - Allow adequate time for meals   - Encourage patient to take dietary supplement as ordered  - Collaborate with clinical nutritionist   - Include patient/family/caregiver in decisions related to nutrition    Outcome: Progressing     Problem: NEUROSENSORY - ADULT  Goal: Achieves stable or improved neurological status  Description: INTERVENTIONS  - Monitor and report changes in neurological status  - Monitor vital signs such as temperature, blood pressure, glucose, and any other labs ordered   - Initiate measures to prevent increased intracranial pressure  - Monitor for seizure activity and implement precautions if appropriate      Outcome: Progressing  Goal: Remains free of injury related to seizures activity  Description: INTERVENTIONS  - Maintain airway, patient safety  and administer oxygen as ordered  - Monitor patient for seizure activity, document and report duration and description of seizure to physician/advanced practitioner  - If seizure occurs,  ensure patient safety during seizure  - Reorient patient post seizure  - Seizure pads on all 4 side rails  - Instruct patient/family to notify RN of any seizure activity including if an aura is experienced  - Instruct patient/family to call for assistance with activity based on nursing assessment  - Administer anti-seizure medications if ordered    Outcome: Progressing  Goal: Achieves maximal functionality and self care  Description: INTERVENTIONS  - Monitor swallowing and airway patency with patient fatigue and changes in neurological status  - Encourage and assist patient to increase activity and self care     - Encourage visually impaired, hearing impaired and aphasic patients to use assistive/communication devices  Outcome: Progressing     Problem: CARDIOVASCULAR - ADULT  Goal: Maintains optimal cardiac output and hemodynamic stability  Description: INTERVENTIONS:  - Monitor I/O, vital signs and rhythm  - Monitor for S/S and trends of decreased cardiac output  - Administer and titrate ordered vasoactive medications to optimize hemodynamic stability  - Assess quality of pulses, skin color and temperature  - Assess for signs of decreased coronary artery perfusion  - Instruct patient to report change in severity of symptoms  Outcome: Progressing  Goal: Absence of cardiac dysrhythmias or at baseline rhythm  Description: INTERVENTIONS:  - Continuous cardiac monitoring, vital signs, obtain 12 lead EKG if ordered  - Administer antiarrhythmic and heart rate control medications as ordered  - Monitor electrolytes and administer replacement therapy as ordered  Outcome: Progressing

## 2023-03-11 NOTE — CASE MANAGEMENT
Case Management Discharge Planning Note    Patient name Kailash Jain  Location [address] [place] [address] [address] [facility] MRN [id number]  : 1955 Date 3/11/2023       Current Admission Date: 3/6/2023  Current Admission Diagnosis:Cerebrovascular accident (CVA) due to embolism of cerebral artery [facility])   Patient Active Problem List    Diagnosis Date Noted   • Lesion of native kidney 2023   • Cerebrovascular accident (CVA) due to embolism of left posterior cerebral artery ([facility] ) 2023   • New onset a-fib ([facility] ) 2023   • Cerebrovascular accident (CVA) due to embolism of cerebral artery ([facility] ) 2023   • Migraine with aura and without status migrainosus, not intractable 2023   • Acute upper respiratory infection 2019   • Premature supraventricular beats 2019   • Long QT interval 2019   • Tremor of both hands 2019   • Head movements abnormal 2019   • Depression, recurrent ([facility] ) 2017   • Tobacco abuse 2017   • Allergic rhinitis 2013   • Hypercholesterolemia 2013      LOS (days): 5  Geometric Mean LOS (GMLOS) (days): 2 90  Days to GMLOS:-1 7     OBJECTIVE:  Risk of Unplanned Readmission Score: 7 1         Current admission status: Inpatient   Preferred Pharmacy:   [facility]/pharmacy [address] [place] [address]  Phone: [phone] Fax: [address] [facility] [address] [place] [address] - [address] [place] [address]  Phone: [phone] Fax: [address] [place] [address]  Phone: [phone] Fax: [phone]    Primary Care Provider: No primary care provider on file  Primary Insurance: MEDICARE  Secondary Insurance:     DISCHARGE DETAILS:          Additional Comments: Patient does not have a prescription plan   CM checked pricing at [facility], [facility] states she does not have an rx plan and Eliquis would cost over $500/month  Patient states she cannot afford this at this time  Per [facility] pharmacy, patient could get the 30 days free Eliquis, however patient needs long term therapy  CM will continue to follow

## 2023-03-11 NOTE — DISCHARGE INSTR - AVS FIRST PAGE
You will receive a phone call from [facility] cardiology associates coumadin clinic to manage your coumadin(warfarin) dosing  If able it is most efficient (but not essential) if you can have your blood work done at a [address] were found to have a stroke due to atrial fibrillation  You were started on aspirin and lipitor for stroke prevention  On 3/19/23, start taking coumadin 5 mg at bedtime  You will need blood work called (INR) through the heart doctor's office  Once you INR is > 2, stop taking the aspirin    You were also started on metoprolol and diltiazem to control your heart rate    You are referred to the urology office regarding the small indeterminate lesion on you left kidney  The office will call you for a follow up   You will need repeat imaging of the kidney in 3 months    The cardiology office and neurology office will be calling you for your follow ups

## 2023-03-11 NOTE — INCIDENTAL FINDINGS
The following findings require follow up:  Radiographic finding   Finding: left kidney indeterminate lesion   Follow up required: urology follow up and repeat imaging   Follow up should be done within 3 month(s)    Please notify the following clinician to assist with the follow up:   Annabelle Land [facility]

## 2023-03-13 ENCOUNTER — TELEPHONE (OUTPATIENT)
Dept: FAMILY MEDICINE CLINIC | Facility: CLINIC | Age: 68
End: 2023-03-13

## 2023-03-13 ENCOUNTER — TRANSITIONAL CARE MANAGEMENT (OUTPATIENT)
Dept: FAMILY MEDICINE CLINIC | Facility: CLINIC | Age: 68
End: 2023-03-13

## 2023-03-13 NOTE — TELEPHONE ENCOUNTER
----- Message from Olive Norwood MD sent at 3/11/2023  1:33 PM EST -----  Thank you for allowing us to participate in the care of your patient, Benigno Breen, who was hospitalized from 3/6/2023 through 3/11/2023 with the admitting diagnosis of stroke due to afib  She  was not able to start anticoagulation yet due to risk for neurology conversion  She will start Coumadin on 3/19/2023 once her INR is more than 2, she will stop the baby aspirin  Currently has no insurance coverage for DOAC  She was referred to urology incidental finding of indeterminate  small left renal lesion which will need repeat imaging in 3 months  She was referred to the cardiology Coumadin clinic for her anticoagulation  Neurology submitted the [employer] form for her because it would not be safe for her to drive with a visual field deficit       If you have any additional questions or would like to discuss further, please feel free to contact me      Olive Norwood MD  [place] [address] Internal Medicine, Hospitalist  [phone]

## 2023-03-13 NOTE — TELEPHONE ENCOUNTER
----- Message from Sonia Saavedra PA-C sent at 3/13/2023  9:05 AM EDT -----  This is an ex patient of ours that was recently hospitalized and it is highly encouraged that she make an appointment with us for ALISON  Please set up   ----- Message -----  From: Dago Ag MD  Sent: 3/11/2023   1:34 PM EDT  To: Sonia Saavedra PA-C    Thank you for allowing us to participate in the care of your patient, Heriberto Garcia, who was hospitalized from 3/6/2023 through 3/11/2023 with the admitting diagnosis of stroke due to afib  She  was not able to start anticoagulation yet due to risk for neurology conversion  She will start Coumadin on 3/19/2023 once her INR is more than 2, she will stop the baby aspirin  Currently has no insurance coverage for DOAC  She was referred to urology incidental finding of indeterminate  small left renal lesion which will need repeat imaging in 3 months  She was referred to the cardiology Coumadin clinic for her anticoagulation  Neurology submitted the [employer] form for her because it would not be safe for her to drive with a visual field deficit       If you have any additional questions or would like to discuss further, please feel free to contact me      Dago Ag MD  [place] [address] Internal Medicine, Hospitalist  [phone]

## 2023-03-14 ENCOUNTER — TELEPHONE (OUTPATIENT)
Dept: CARDIOLOGY CLINIC | Facility: CLINIC | Age: 68
End: 2023-03-14

## 2023-03-14 ENCOUNTER — ANTICOAG VISIT (OUTPATIENT)
Dept: CARDIOLOGY CLINIC | Facility: CLINIC | Age: 68
End: 2023-03-14

## 2023-03-14 DIAGNOSIS — I48.91 NEW ONSET A-FIB (HCC): Primary | ICD-10-CM

## 2023-03-14 NOTE — PROGRESS NOTES
This patient was hospitalized with Dx of CVA and new atrial fibrillation - Discharged from hospital 3/11   She does not have insurance coverage for DOAC and will be started on warfain - Plan per neurology is to begin use on 3/19  I believe she will be given 5mg tabs with planned INR on 3/20  She was informed you will correspond with her for management        Attempted to contact patient, left message to contact office      Standing INR order placed

## 2023-03-17 ENCOUNTER — TELEPHONE (OUTPATIENT)
Dept: NEUROLOGY | Facility: CLINIC | Age: 68
End: 2023-03-17

## 2023-03-17 ENCOUNTER — OFFICE VISIT (OUTPATIENT)
Dept: FAMILY MEDICINE CLINIC | Facility: CLINIC | Age: 68
End: 2023-03-17

## 2023-03-17 VITALS
BODY MASS INDEX: 26.46 KG/M2 | HEIGHT: 64 IN | WEIGHT: 155 LBS | OXYGEN SATURATION: 94 % | HEART RATE: 81 BPM | DIASTOLIC BLOOD PRESSURE: 56 MMHG | SYSTOLIC BLOOD PRESSURE: 106 MMHG

## 2023-03-17 DIAGNOSIS — Z72.0 TOBACCO ABUSE: ICD-10-CM

## 2023-03-17 DIAGNOSIS — Z74.8 ASSISTANCE NEEDED WITH TRANSPORTATION: ICD-10-CM

## 2023-03-17 DIAGNOSIS — H53.461 RIGHT HOMONYMOUS HEMIANOPSIA: ICD-10-CM

## 2023-03-17 DIAGNOSIS — F33.9 DEPRESSION, RECURRENT (HCC): ICD-10-CM

## 2023-03-17 DIAGNOSIS — I48.91 NEW ONSET A-FIB (HCC): ICD-10-CM

## 2023-03-17 DIAGNOSIS — E78.00 HYPERCHOLESTEROLEMIA: ICD-10-CM

## 2023-03-17 DIAGNOSIS — G93.6 CEREBRAL EDEMA (HCC): ICD-10-CM

## 2023-03-17 DIAGNOSIS — I63.40 CEREBROVASCULAR ACCIDENT (CVA) DUE TO EMBOLISM OF CEREBRAL ARTERY (HCC): Primary | ICD-10-CM

## 2023-03-17 NOTE — TELEPHONE ENCOUNTER
Post CVA Discharge Follow Up  Hospitalization: 3/6/23-3/11/23    Called patient with no answer  Left a voice message requesting for a call  Provided the office's phone number

## 2023-03-17 NOTE — PATIENT INSTRUCTIONS
Assessment/plan:  1  CVA with residual left hemianopsia-recommend referral to neurology, ophthalmology, and Occupational Therapy  We will also place referrals for  to discuss transportation issues since patient is not able to drive at this time  Continue management with Coumadin  2   Atrial fibrillation-presently rate controlled with diltiazem, digoxin, and metoprolol  Continue following with cardiology and Coumadin clinic through cardiology  3   Tobacco abuse-patient has been abstinent of cigarettes for about 3 weeks  Continue cessation  4   Cerebral edema-status post CVA  Follow-up with neurology

## 2023-03-17 NOTE — PROGRESS NOTES
Assessment & Plan     Patient Instructions   Assessment/plan:  1  CVA with residual left hemianopsia-recommend referral to neurology, ophthalmology, and Occupational Therapy  We will also place referrals for  to discuss transportation issues since patient is not able to drive at this time  Continue management with Coumadin  2   Atrial fibrillation-presently rate controlled with diltiazem, digoxin, and metoprolol  Continue following with cardiology and Coumadin clinic through cardiology  3   Tobacco abuse-patient has been abstinent of cigarettes for about 3 weeks  Continue cessation  4   Cerebral edema-status post CVA  Follow-up with neurology  1  Cerebrovascular accident (CVA) due to embolism of cerebral artery [facility])  -     Ambulatory Referral to Neurology; Future  -     Ambulatory Referral to Occupational Therapy; Future  -     Ambulatory Referral to Social Work Care Management Program; Future    2  New onset a-fib [facility])  -     Ambulatory Referral to Social Work Care Management Program; Future    3  Hypercholesterolemia    4  Tobacco abuse    5  Right homonymous hemianopsia  -     Ambulatory Referral to Ophthalmology; Future    6  Cerebral edema (HCC)    7  Depression, recurrent ([facility] )    8  Assistance needed with transportation  -     Ambulatory Referral to Social Work Care Management Program; Future       Subjective     Transitional Care Management Review:   Kailash Jain is a 80 y o  female here for TCM follow up       During the TCM phone call patient stated:  TCM Call     Date and time call was made  3/13/2023 11:19 AM    Hospital care reviewed  Records reviewed    Patient was hospitialized at  [facility]    Date of Admission  03/06/23    Date of discharge  03/11/23    Diagnosis  CVA    Disposition  Home    Were the patients medications reviewed and updated  No    Current Symptoms  None      TCM Call     Post hospital issues  None    Should patient be enrolled in anticoag monitoring? No    Scheduled for follow up? Yes    Did you obtain your prescribed medications  Yes    Do you need help managing your prescriptions or medications  No    Is transportation to your appointment needed  No    I have advised the patient to call PCP with any new or worsening symptoms  Lissy Marin, Practice Administrator    Living Arrangements  Family members    Support System  Family        HPI: This is a 72-year-old female that presents to the office for transition of care management appointment  She was recently discharged from the hospital after having stroke  She does have residual right-sided hemianopsia  She was referred to neurology but has not yet set up appointment  Her daughter seems to be coordinating most of the care for her  She does not have anything set up with ophthalmology  She has been set up with Coumadin clinic and is to start taking Coumadin on this Sunday  She will continue to follow with them for regular blood testing  We discussed the importance of having this tested regularly so that her Coumadin levels remain in a therapeutic range  She has not had any weakness of the extremities after the stroke but she does have some delayed cognition  She feels that it takes her more time to put things together  She does note that when she is saying daily prayers she sometimes has difficulty remembering or organizing the things she is trying to say  She has been referred to occupational therapy but has not again set anything up  She is having difficulty with transportation since she is not able to drive  Her daughter does not drive either  Review of Systems   Constitutional: Negative for chills, fatigue and fever  HENT: Negative for congestion, ear pain and sinus pressure  Eyes: Negative for visual disturbance  Respiratory: Negative for cough, chest tightness and shortness of breath  Cardiovascular: Negative for chest pain and palpitations  Gastrointestinal: Negative for diarrhea, nausea and vomiting  Endocrine: Negative for polyuria  Genitourinary: Negative for dysuria and frequency  Musculoskeletal: Negative for arthralgias and myalgias  Skin: Negative for pallor and rash  Neurological: Negative for dizziness, weakness, light-headedness, numbness and headaches  Psychiatric/Behavioral: Negative for agitation, behavioral problems and sleep disturbance  All other systems reviewed and are negative  Objective     /56 (BP Location: Left arm, Patient Position: Sitting, Cuff Size: Standard)   Pulse 81   Ht 5' 4" (1 626 m)   Wt 70 3 kg (155 lb)   SpO2 94%   BMI 26 61 kg/m²      Physical Exam  Constitutional:       General: She is not in acute distress  Appearance: Normal appearance  HENT:      Head: Normocephalic and atraumatic  Right Ear: Tympanic membrane normal       Left Ear: Tympanic membrane normal       Nose: No congestion or rhinorrhea  Eyes:      Conjunctiva/sclera: Conjunctivae normal       Pupils: Pupils are equal, round, and reactive to light  Comments: Visual fields by confrontation reveal left hemianopsia at midline  Neck:      Vascular: No carotid bruit  Cardiovascular:      Rate and Rhythm: Normal rate and regular rhythm  Heart sounds: No murmur heard  Pulmonary:      Effort: Pulmonary effort is normal  No respiratory distress  Breath sounds: Normal breath sounds  Abdominal:      Palpations: Abdomen is soft  Musculoskeletal:         General: Normal range of motion  Cervical back: Normal range of motion and neck supple  No muscular tenderness  Lymphadenopathy:      Cervical: No cervical adenopathy  Skin:     General: Skin is warm  Capillary Refill: Capillary refill takes less than 2 seconds  Neurological:      General: No focal deficit present  Mental Status: She is alert and oriented to person, place, and time     Psychiatric:         Mood and Affect: Mood normal        Medications have been reviewed by provider in current encounter    Pierce Sheriff PA-C

## 2023-03-17 NOTE — PROGRESS NOTES
3/17/23~left message for daughter Denisa York to call office or have patient call office to discuss Coumadin

## 2023-03-20 ENCOUNTER — TELEPHONE (OUTPATIENT)
Dept: LAB | Facility: HOSPITAL | Age: 68
End: 2023-03-20

## 2023-03-20 ENCOUNTER — PATIENT OUTREACH (OUTPATIENT)
Dept: FAMILY MEDICINE CLINIC | Facility: CLINIC | Age: 68
End: 2023-03-20

## 2023-03-20 DIAGNOSIS — Z59.82 TRANSPORTATION INSECURITY: Primary | ICD-10-CM

## 2023-03-20 DIAGNOSIS — I48.91 NEW ONSET A-FIB (HCC): ICD-10-CM

## 2023-03-20 RX ORDER — DILTIAZEM HYDROCHLORIDE 120 MG/1
120 TABLET, FILM COATED ORAL EVERY 6 HOURS SCHEDULED
Qty: 30 TABLET | Refills: 1 | Status: SHIPPED | OUTPATIENT
Start: 2023-03-20

## 2023-03-20 SDOH — ECONOMIC STABILITY - TRANSPORTATION SECURITY: TRANSPORTATION INSECURITY: Z59.82

## 2023-03-20 NOTE — PROGRESS NOTES
3/20/23~Sharmaine returned my call, introduced self, educated on Coumadin  States patient took 5 mg yesterday and this morning, request patient take in evening, in case adjustments need to be made  Patient will take 5 mg tomorrow evening and will obtain INR 3/22/23  Phone # for mobile lab given

## 2023-03-20 NOTE — PROGRESS NOTES
OP CM called to pt in regards to [facility] referral/transportation  Pts dtr answered the phone  Pt is no longer worker since stroke  Pt was a   Pt also does not have secondary insurance  Explained to dtr that she can apply for pt to have medicaid secondary since pt is no longer able to work  Also explained hardship program through [facility] and gave dtr the number to call and request application  Pt resides with her daughter at home with one step to enter  Pt is indep with ADLs  Pts dtr states that she only works in the summer so she is currently home with pt  Also explained home health waiver so dtr is aware of program in the future if pt ends up having more needs  Referral placed to [facility] for Adrian Thao  OP CM will remain available

## 2023-03-20 NOTE — PROGRESS NOTES
[facility]- Chart Review for referral from OP CM MERYL Schuler to assist patient with transportation-   JOCELINE HURT on file in chart JOCELINE HURT [id number]  [facility]- Left detailed message requesting a return call to assist with transportation as needed  CMOC will follow up in 3 days time if no return ca;ll from patient

## 2023-03-20 NOTE — TELEPHONE ENCOUNTER
Post CVA Discharge Follow Up  Hospitalization: 3/6/23-3/11/23    Called patient  Since discharge, she denies experiencing any new or worsening stroke-like symptoms  Patient reports she continues to have a right visual field cut and intermittent speech difficulty  Denies any changes to these symptoms since discharge  She requested for this RN to reach out to Louann Ivory, patient's daughter  Patient reports how Louann More usually helps with the phone calls and coordination of care  Agreed to reach out to Louann Ivory  She was appreciative  Victor Mtrish Gaffney, patient's daughter  Since discharge, she denies experiencing any new or worsening stroke-like symptoms  She reports the patient continues to have the following symptoms: right sided visual field cut and intermittent speech difficulty  She reports how the speech difficulty has been improving since discharge  Denies any change with the vision field cut  Patient is ambulating independently as well as preforming her own ADLs  Patient manages her own medications, appointments, and affairs  Reviewed appointments - patient successfully followed up with PCP  Patient scheduled for the following: cardiology on 4/4/23 and she is being managed by the coumadin clinic  Offered to schedule stroke hospital follow up appointment, she is agreeable to this  She reports how the appointment can only be on Tuesday or Friday due to transportation issues  Scheduled for next available and placed patient as high priority on the wait list     Reports how she is waiting to hear back from in home PT  She placed to the PT department  I reviewed medications with her  Reports having no difficulties obtaining medications  Reports the patient is taking as prescribed with no medication side effects or signs of bleeding  During this call, we reviewed stroke type, symptoms, personal risk factors and management, medications, and resources   Offered to mail stroke education booklet, she is agreeable to this  As for risk factors, she reports how they recently purchased a BP machine and will start to check her BP once a day  They will notify PCP for BP management  She is a non smoker  Patient quit smoking a few weeks ago  Encouraged patient to follow a well-balanced healthy diet  I addressed all her questions  At the conclusion of the conversation, she denies having any further questions or concerns

## 2023-03-21 ENCOUNTER — PATIENT OUTREACH (OUTPATIENT)
Dept: FAMILY MEDICINE CLINIC | Facility: CLINIC | Age: 68
End: 2023-03-21

## 2023-03-21 NOTE — PROGRESS NOTES
[facility]- 2nd attempt to reach  following up with patient on request for transportation with [employer] and application assistance  [facility] left a detailed message requesting a return call to assist with application as requested  CMOC will close referral in 3 days time if no return call for assistance      Referral  logged in [place]-

## 2023-03-23 NOTE — PROGRESS NOTES
3/23/23~left message for Oren Haddad to find out when patient going for INR  Oren Haddad called, states patient will be going tomorrow

## 2023-03-24 ENCOUNTER — APPOINTMENT (OUTPATIENT)
Dept: LAB | Facility: CLINIC | Age: 68
End: 2023-03-24

## 2023-03-24 ENCOUNTER — PATIENT OUTREACH (OUTPATIENT)
Dept: FAMILY MEDICINE CLINIC | Facility: CLINIC | Age: 68
End: 2023-03-24

## 2023-03-24 NOTE — PROGRESS NOTES
3/24/23~no results back yet, spoke with [facility], she will be on call and will monitor for results and call with dosing

## 2023-03-24 NOTE — PROGRESS NOTES
[facility]- contacting [facility] Daughter Faith Jarrell, [facility] can complete the application for [employer]- no O2 no walker no cane - medicare at this time  [facility] will complete application on [employer]   [employer]-   application number [id number]  [employer]- [id number]  Discussed [employer] application for [place] reviewed information needed  EC will complete application on [employer]  Patient aware welcome packet will come in mail in 10 days 2 weeks to explain use and how to set up eco pay-    EC very appreciative of the assistance  CMOC will close referral in 2 days time if additional needs  [facility]- e-mail from [employer] application received

## 2023-03-27 ENCOUNTER — TELEPHONE (OUTPATIENT)
Dept: LAB | Facility: HOSPITAL | Age: 68
End: 2023-03-27

## 2023-03-27 ENCOUNTER — ANTICOAG VISIT (OUTPATIENT)
Dept: CARDIOLOGY CLINIC | Facility: CLINIC | Age: 68
End: 2023-03-27

## 2023-03-27 NOTE — PROGRESS NOTES
adri called on Sat instructed 7 5 sat and Sun/ LM to see if went today for INR if not needs to go tomorrow and to take 5 mg today

## 2023-03-27 NOTE — PROGRESS NOTES
Daughter returned call did not get INR today and  Will take 7 5 tonight  Calling for mobile lab to draw tomorrow because they are in [facility] tomorrow per daughter  All communications through daughter

## 2023-03-28 NOTE — PROGRESS NOTES
Spoke with Darcy Melton, patient will be going for INR tomorrow, advised to have patient take 7 5 mg tonight   Will discuss further tomorrow

## 2023-03-29 ENCOUNTER — APPOINTMENT (OUTPATIENT)
Dept: LAB | Facility: CLINIC | Age: 68
End: 2023-03-29

## 2023-03-29 ENCOUNTER — TELEPHONE (OUTPATIENT)
Dept: FAMILY MEDICINE CLINIC | Facility: CLINIC | Age: 68
End: 2023-03-29

## 2023-03-29 ENCOUNTER — PATIENT OUTREACH (OUTPATIENT)
Dept: FAMILY MEDICINE CLINIC | Facility: CLINIC | Age: 68
End: 2023-03-29

## 2023-03-29 NOTE — PROGRESS NOTES
[facility]- dropped of updated [employer] application and spoke [facility]- application  is completed and approved  Patient able to book trips at this time-will be senior shared ride system and private pay at this time   [employer] Welcome packet in mail to patient- [facility] Daughter will assist patient with setting up eco pay and scheduling trips  CMOC will update status with EC on application approval     CMOC- follow up next day

## 2023-03-30 ENCOUNTER — ANTICOAG VISIT (OUTPATIENT)
Dept: CARDIOLOGY CLINIC | Facility: CLINIC | Age: 68
End: 2023-03-30

## 2023-03-30 ENCOUNTER — TELEPHONE (OUTPATIENT)
Dept: FAMILY MEDICINE CLINIC | Facility: CLINIC | Age: 68
End: 2023-03-30

## 2023-03-30 NOTE — TELEPHONE ENCOUNTER
Hi, my name is Tyesha De Leon  I'm a physical therapist with [facility] health  I was calling about patient Ferny Rivas  Her birthday is [id number] and she saw the PA, Dayan Trivedi, recently  I'm calling to confirm that either he or a doctor will write will sign home care orders when we send them over  I can be reached at [phone]  Thank you

## 2023-03-30 NOTE — PROGRESS NOTES
Spoke with Tad Serrano, advised INR high, states patient took 7 5 mg last night, advised to hold today, 5 mg Fri, 7 5 mg Sat, 5 mg Sun and recheck Mon 4/3/23  Asked to try to get to lab earlier in the day so we get the results back the same day

## 2023-03-31 ENCOUNTER — PATIENT OUTREACH (OUTPATIENT)
Dept: FAMILY MEDICINE CLINIC | Facility: CLINIC | Age: 68
End: 2023-03-31

## 2023-03-31 NOTE — PROGRESS NOTES
[facility]- Chart Review to close referral as Alec Juárez was approved with Welcome packet mailed to patient   EC Daughter assist patient with scheduling trips and setting up eco pay      [facility] closing referral this day and routing note to OP CM MSW status of closing referral-

## 2023-03-31 NOTE — TELEPHONE ENCOUNTER
Notified Carlitos Carvalho (physical therapist) Melissa De La Torre would sign the orders for patient home care

## 2023-04-03 ENCOUNTER — APPOINTMENT (OUTPATIENT)
Dept: LAB | Facility: CLINIC | Age: 68
End: 2023-04-03

## 2023-04-03 ENCOUNTER — ANTICOAG VISIT (OUTPATIENT)
Dept: CARDIOLOGY CLINIC | Facility: CLINIC | Age: 68
End: 2023-04-03

## 2023-04-03 NOTE — PROGRESS NOTES
Left message for Kortney Dove, advised INR high again, advised to hold tonight, then take 7 5 mg Fri, 5 mg all other days, and recheck next week 4/10/23 or 4/11/23  Advised to call with questions or concerns

## 2023-04-03 NOTE — PROGRESS NOTES
Cardiology Office Follow Up  Ray Mariscal  1955  [id number]    ASSESSMENT:  · Cerebrovascular accident due to embolism of left posterior cerebral artery   - CTA head and neck 3/6/2023: occlusion of distal P2 segment of the left posterior cerebral artery   - Brain MRI 3/7/2023: large acute/subacute left PCA territory infarct with associated edema without significant mass effect or hemorrhagic transformation 2 tiny acute/subacute cortical infarcts in the posterior left frontal lobe   - appeared to be cardioembolic in the setting of new onset atrial fibrillation  · New onset atrial fibrillation/flutter   - diagnosed march 2023    - anticoagulation with warfarin  - current prescribed Rx, Toprol-XL 25 mg twice daily, Cardizem 120 mg every 6 hours, digoxin 0 125 mg daily  - TTE 3/7/2023: EF 54%, mildly dilated RV, mild LA and RA, mild TR  · Hypercholesterolemia  · Tobacco abuse, recent cessation   · Migraine with aura     PLAN:  · Medications reviewed with the patient and daughter  Patient is currently prescribed Cardizem 120 mg every 6 hours, digoxin 0 125 mg daily, Toprol-XL 25 mg twice daily  · Per daughter, she has been giving the Cardizem 3 times daily and not the 4 times daily  We will decrease Cardizem to twice daily dosing  · We will have patient return to the office in 1 week for repeat ECG to ensure heart rate stability as patient was to be discharged on Cardizem 120 mg daily  · We will repeat digoxin level prior to next office visit  · We will complete 2-week ZIO monitor to assess atrial fibrillation burden  · Continue anticoagulation with warfarin per guidance by the Coumadin clinic  INR is greater than 2 thus aspirin can be discontinued  · Continue statin therapy with atorvastatin 40 mg daily  · Patient will have repeat ECG in 1 week with nurse  · Patient will follow up with Dr Dell Fritz in approximately 6 weeks      Interval History/ HPI:   81-year-old female presents for follow-up "post recent hospitalization  Patient was hospitalized at the [facility] on 3/6/23 through 3/11/23 after presenting with a headache  She was found to have embolic strokes and newly diagnosed atrial fibrillation  Patient without insurance coverage for NOAC so she was initiated on Coumadin and follows with the Coumadin clinic  Upon office visit today, patient states she has no shortness of breath, chest pain, palpitations  She does endorse lightheadedness and dizziness in the evenings  She denies syncope or presyncope  She is not eating much, but she is drinking shakes  Vitals:  /50 (BP Location: Right arm, Patient Position: Sitting, Cuff Size: Adult)   Pulse 58   Ht 5' 4\" (1 626 m)   Wt 67 6 kg (149 lb)   BMI 25 58 kg/m²     Past Medical History:   Diagnosis Date   • Migraine    • Premature supraventricular beats      Social History     Socioeconomic History   • Marital status:      Spouse name: Not on file   • Number of children: 1   • Years of education: Not on file   • Highest education level: Not on file   Occupational History   • Occupation: EMPLOYED   Tobacco Use   • Smoking status: Every Day     Packs/day: 1 00     Years: 40 00     Pack years: 40 00     Types: Cigarettes   • Smokeless tobacco: Never   Vaping Use   • Vaping Use: Every day   Substance and Sexual Activity   • Alcohol use: No     Comment: (HISTORY)   • Drug use: No   • Sexual activity: Not Currently   Other Topics Concern   • Not on file   Social History Narrative     AS PER [facility]     Social Determinants of Health     Financial Resource Strain: Not on file   Food Insecurity: No Food Insecurity   • Worried About Running Out of Food in the Last Year: Never true   • Ran Out of Food in the Last Year: Never true   Transportation Needs: No Transportation Needs   • Lack of Transportation (Medical): No   • Lack of Transportation (Non-Medical):  No   Physical Activity: Not on file   Stress: Not on file   Social " Connections: Not on file   Intimate Partner Violence: Not on file   Housing Stability: Low Risk    • Unable to Pay for Housing in the Last Year: No   • Number of Places Lived in the Last Year: 1   • Unstable Housing in the Last Year: No      Family History   Problem Relation Age of Onset   • Colon cancer Father    • Colon cancer Brother      No past surgical history on file  Current Outpatient Medications:   •  aspirin (ECOTRIN LOW STRENGTH) 81 mg EC tablet, Take 1 tablet (81 mg total) by mouth daily STOP aspirin once you INR is > 2 Do not start before March 12, 2023 , Disp: 14 tablet, Rfl: 0  •  atorvastatin (LIPITOR) 40 mg tablet, Take 1 tablet (40 mg total) by mouth daily with dinner, Disp: 30 tablet, Rfl: 1  •  digoxin (LANOXIN) 0 125 mg tablet, Take 1 tablet (125 mcg total) by mouth daily Do not start before March 12, 2023 , Disp: 30 tablet, Rfl: 0  •  diltiazem (CARDIZEM) 120 MG tablet, Take 1 tablet (120 mg total) by mouth every 6 (six) hours, Disp: 30 tablet, Rfl: 1  •  metoprolol succinate (TOPROL-XL) 25 mg 24 hr tablet, Take 1 tablet (25 mg total) by mouth every 12 (twelve) hours, Disp: 60 tablet, Rfl: 0  •  nicotine (NICODERM CQ) 21 mg/24 hr TD 24 hr patch, Place 1 patch on the skin over 24 hours daily Do not start before March 12, 2023 , Disp: 28 patch, Rfl: 0  •  warfarin (Coumadin) 5 mg tablet, Take 1 tablet (5 mg total) by mouth daily Do not start before March 19, 2023 , Disp: 30 tablet, Rfl: 0      Review of Systems:  Review of Systems   Constitutional: Positive for appetite change  Negative for activity change  Respiratory: Negative for chest tightness, shortness of breath and wheezing  Cardiovascular: Negative for chest pain, palpitations and leg swelling  Neurological: Positive for dizziness and light-headedness  Negative for syncope and weakness  Psychiatric/Behavioral: Negative for confusion  Physical Exam:  Physical Exam  Constitutional:       Appearance: Normal appearance  Cardiovascular:      Rate and Rhythm: Bradycardia present  Rhythm irregular  Pulses: Normal pulses  Pulmonary:      Effort: Pulmonary effort is normal  No respiratory distress  Abdominal:      General: Bowel sounds are normal  There is no distension  Musculoskeletal:         General: No swelling  Right lower leg: No edema  Left lower leg: No edema  Skin:     General: Skin is warm  Neurological:      General: No focal deficit present  Mental Status: She is alert  Mental status is at baseline  Psychiatric:         Mood and Affect: Mood normal          Thought Content: Thought content normal          This note was completed in part utilizing M-Modal Fluency Direct Software  Grammatical errors, random word insertions, spelling mistakes, and incomplete sentences can be an occasional consequence of this system secondary to software limitations, ambient noise, and hardware issues  If you have any questions or concerns about the content, text, or information contained within the body of this dictation, please contact the provider for clarification

## 2023-04-04 ENCOUNTER — OFFICE VISIT (OUTPATIENT)
Dept: CARDIOLOGY CLINIC | Facility: CLINIC | Age: 68
End: 2023-04-04

## 2023-04-04 VITALS
SYSTOLIC BLOOD PRESSURE: 110 MMHG | DIASTOLIC BLOOD PRESSURE: 50 MMHG | BODY MASS INDEX: 25.44 KG/M2 | HEART RATE: 58 BPM | HEIGHT: 64 IN | WEIGHT: 149 LBS

## 2023-04-04 DIAGNOSIS — I48.91 NEW ONSET A-FIB (HCC): ICD-10-CM

## 2023-04-04 DIAGNOSIS — I63.40 CEREBROVASCULAR ACCIDENT (CVA) DUE TO EMBOLISM OF CEREBRAL ARTERY (HCC): ICD-10-CM

## 2023-04-04 RX ORDER — METOPROLOL SUCCINATE 25 MG/1
25 TABLET, EXTENDED RELEASE ORAL EVERY 12 HOURS SCHEDULED
Qty: 60 TABLET | Refills: 2 | Status: SHIPPED | OUTPATIENT
Start: 2023-04-04

## 2023-04-04 RX ORDER — DIGOXIN 125 MCG
125 TABLET ORAL DAILY
Qty: 30 TABLET | Refills: 2 | Status: SHIPPED | OUTPATIENT
Start: 2023-04-04

## 2023-04-04 RX ORDER — DILTIAZEM HYDROCHLORIDE 120 MG/1
120 TABLET, FILM COATED ORAL EVERY 12 HOURS SCHEDULED
Qty: 30 TABLET | Refills: 2 | Status: SHIPPED | OUTPATIENT
Start: 2023-04-04

## 2023-04-04 RX ORDER — ATORVASTATIN CALCIUM 40 MG/1
40 TABLET, FILM COATED ORAL
Qty: 30 TABLET | Refills: 2 | Status: SHIPPED | OUTPATIENT
Start: 2023-04-04

## 2023-04-06 ENCOUNTER — TELEPHONE (OUTPATIENT)
Dept: FAMILY MEDICINE CLINIC | Facility: CLINIC | Age: 68
End: 2023-04-06

## 2023-04-06 NOTE — TELEPHONE ENCOUNTER
Jennifer from [facility] called to request an order for behavioral health visits  They are requesting 9 scheduled visits, and 3 visits as needed  Can an order please be entered? Thank you!

## 2023-04-10 NOTE — PROGRESS NOTES
4/10/23~Sharmaine called, states patient unable to have INR today, may not be able to get INR until 4/13/23  Advised that it is important to test more frequently until INR is within goal  Advised to continue 5 mg daily until we can get INR

## 2023-04-18 ENCOUNTER — TELEPHONE (OUTPATIENT)
Dept: LAB | Facility: HOSPITAL | Age: 68
End: 2023-04-18

## 2023-04-25 ENCOUNTER — APPOINTMENT (OUTPATIENT)
Dept: LAB | Facility: HOSPITAL | Age: 68
End: 2023-04-25
Attending: INTERNAL MEDICINE

## 2023-04-25 ENCOUNTER — ANTICOAG VISIT (OUTPATIENT)
Dept: CARDIOLOGY CLINIC | Facility: CLINIC | Age: 68
End: 2023-04-25

## 2023-04-25 NOTE — PROGRESS NOTES
Spoke with [facility], advised INR even lower, when verifying dose, patient was getting 5 mg Mon Fri, 2 5 mg all other days, advised to have patient take 2 5 mg Mon Wed Fri, 5 mg all other days, will recheck next week 5/2/23

## 2023-04-28 ENCOUNTER — CLINICAL SUPPORT (OUTPATIENT)
Dept: CARDIOLOGY CLINIC | Facility: CLINIC | Age: 68
End: 2023-04-28

## 2023-04-28 DIAGNOSIS — I48.91 NEW ONSET A-FIB (HCC): ICD-10-CM

## 2023-05-02 ENCOUNTER — APPOINTMENT (OUTPATIENT)
Dept: LAB | Facility: HOSPITAL | Age: 68
End: 2023-05-02
Attending: INTERNAL MEDICINE

## 2023-05-02 ENCOUNTER — ANTICOAG VISIT (OUTPATIENT)
Dept: CARDIOLOGY CLINIC | Facility: CLINIC | Age: 68
End: 2023-05-02

## 2023-05-02 NOTE — PROGRESS NOTES
Spoke with Eleni Lewis, advised INR coming up, advised to have patient take 7 5 mg tonight, then 2 5 mg only Wed, 5 mg all other days, will recheck next week 5/9/23

## 2023-05-09 ENCOUNTER — ANTICOAG VISIT (OUTPATIENT)
Dept: CARDIOLOGY CLINIC | Facility: CLINIC | Age: 68
End: 2023-05-09

## 2023-05-09 ENCOUNTER — APPOINTMENT (OUTPATIENT)
Dept: LAB | Facility: HOSPITAL | Age: 68
End: 2023-05-09
Attending: INTERNAL MEDICINE

## 2023-05-11 ENCOUNTER — TELEPHONE (OUTPATIENT)
Dept: NEUROLOGY | Facility: CLINIC | Age: 68
End: 2023-05-11

## 2023-05-11 NOTE — TELEPHONE ENCOUNTER
Patient is scheduled for a neurology hospital on 6/2/23  Called patient and spoke with Dick Velázquez  Offered a sooner appointment with Obey Villegas at [facility] for tomorrow on 5/12/23 at 10:15 am  She declined since she believes the patient has another appointment that day  Offered her a sooner appointment on 5/22/23 with Annette Arboleda  She declined this appointment too since the appointment needs to be on a Tuesday or Friday  Will keep patient on the wait list  She was appreciative

## 2023-05-13 PROBLEM — I48.91 NEW ONSET A-FIB (HCC): Status: RESOLVED | Noted: 2023-03-06 | Resolved: 2023-05-13

## 2023-05-13 PROBLEM — I48.19 PERSISTENT ATRIAL FIBRILLATION (HCC): Status: ACTIVE | Noted: 2023-03-06

## 2023-05-13 PROBLEM — I49.3 PVC'S (PREMATURE VENTRICULAR CONTRACTIONS): Status: ACTIVE | Noted: 2023-05-13

## 2023-05-13 PROBLEM — I49.1 PREMATURE SUPRAVENTRICULAR BEATS: Status: RESOLVED | Noted: 2019-04-25 | Resolved: 2023-05-13

## 2023-05-16 ENCOUNTER — APPOINTMENT (OUTPATIENT)
Dept: LAB | Facility: HOSPITAL | Age: 68
End: 2023-05-16
Attending: INTERNAL MEDICINE

## 2023-05-17 ENCOUNTER — ANTICOAG VISIT (OUTPATIENT)
Dept: CARDIOLOGY CLINIC | Facility: CLINIC | Age: 68
End: 2023-05-17

## 2023-05-17 NOTE — PROGRESS NOTES
Spoke with Ge Contreras, advised INR good, advised to have patient take 2 5 mg Mon Wed Fri, 5 mg all other days, will recheck next week, 5/23/23

## 2023-05-23 ENCOUNTER — ANTICOAG VISIT (OUTPATIENT)
Dept: CARDIOLOGY CLINIC | Facility: CLINIC | Age: 68
End: 2023-05-23

## 2023-05-23 ENCOUNTER — APPOINTMENT (OUTPATIENT)
Dept: LAB | Facility: HOSPITAL | Age: 68
End: 2023-05-23
Attending: INTERNAL MEDICINE

## 2023-05-23 NOTE — PROGRESS NOTES
Left message for Alison Villasenor, advised INR good, continue 2 5 mg Mon Wed Fri, 5 mg all other days, will recheck in 2 weeks 6/6/23    Patient scheduled weekly for mobile lab, advised she can cancel next week and get INR 6/6/23  Advised to call with questions or concerns

## 2023-05-25 ENCOUNTER — TELEPHONE (OUTPATIENT)
Dept: NEUROLOGY | Facility: CLINIC | Age: 68
End: 2023-05-25

## 2023-05-25 NOTE — TELEPHONE ENCOUNTER
Patient is scheduled for 6/2/23  There was a cancellation for tomorrow on 5/26/23 with [facility] and patient is on the wait list  Called patient to offer the sooner appointment  No answer  Left a voice message requesting for a return call if they are interested in the sooner appointment  Provided the office's phone number

## 2023-05-26 DIAGNOSIS — I48.91 NEW ONSET A-FIB (HCC): ICD-10-CM

## 2023-05-26 RX ORDER — DILTIAZEM HYDROCHLORIDE 120 MG/1
TABLET, FILM COATED ORAL
Qty: 30 TABLET | Refills: 2 | Status: SHIPPED | OUTPATIENT
Start: 2023-05-26

## 2023-05-30 ENCOUNTER — APPOINTMENT (OUTPATIENT)
Dept: LAB | Facility: HOSPITAL | Age: 68
End: 2023-05-30
Attending: INTERNAL MEDICINE

## 2023-05-30 ENCOUNTER — ANTICOAG VISIT (OUTPATIENT)
Dept: CARDIOLOGY CLINIC | Facility: CLINIC | Age: 68
End: 2023-05-30

## 2023-05-30 NOTE — PROGRESS NOTES
Spoke with patient, advised INR good, continue 2 5 mg Mon Wed Fri, 5 mg all other days, can recheck in 2 weeks 6/13/23 or ok next week 6/6/23

## 2023-06-02 NOTE — TELEPHONE ENCOUNTER
Patients daughter called and rescheduled patients appointment that was originally today due to the patient not feeling well   Patient has been rescheduled and placed on waitlist

## 2023-06-06 ENCOUNTER — OFFICE VISIT (OUTPATIENT)
Dept: FAMILY MEDICINE CLINIC | Facility: CLINIC | Age: 68
End: 2023-06-06
Payer: MEDICARE

## 2023-06-06 ENCOUNTER — TELEPHONE (OUTPATIENT)
Dept: FAMILY MEDICINE CLINIC | Facility: CLINIC | Age: 68
End: 2023-06-06

## 2023-06-06 VITALS
OXYGEN SATURATION: 99 % | WEIGHT: 146 LBS | BODY MASS INDEX: 24.92 KG/M2 | HEART RATE: 60 BPM | HEIGHT: 64 IN | DIASTOLIC BLOOD PRESSURE: 60 MMHG | SYSTOLIC BLOOD PRESSURE: 100 MMHG

## 2023-06-06 DIAGNOSIS — F33.9 DEPRESSION, RECURRENT (HCC): ICD-10-CM

## 2023-06-06 DIAGNOSIS — E78.00 HYPERCHOLESTEROLEMIA: ICD-10-CM

## 2023-06-06 DIAGNOSIS — Z12.4 SCREENING FOR CERVICAL CANCER: ICD-10-CM

## 2023-06-06 DIAGNOSIS — Z12.11 SCREEN FOR COLON CANCER: ICD-10-CM

## 2023-06-06 DIAGNOSIS — Z59.82 INABILITY TO ACQUIRE TRANSPORTATION: ICD-10-CM

## 2023-06-06 DIAGNOSIS — I48.19 PERSISTENT ATRIAL FIBRILLATION (HCC): ICD-10-CM

## 2023-06-06 DIAGNOSIS — Z59.9 FINANCIAL DIFFICULTIES: ICD-10-CM

## 2023-06-06 DIAGNOSIS — Z00.00 HEALTHCARE MAINTENANCE: ICD-10-CM

## 2023-06-06 DIAGNOSIS — I63.40 CEREBROVASCULAR ACCIDENT (CVA) DUE TO EMBOLISM OF CEREBRAL ARTERY (HCC): Primary | ICD-10-CM

## 2023-06-06 DIAGNOSIS — Z12.31 ENCOUNTER FOR SCREENING MAMMOGRAM FOR MALIGNANT NEOPLASM OF BREAST: ICD-10-CM

## 2023-06-06 PROCEDURE — 99214 OFFICE O/P EST MOD 30 MIN: CPT | Performed by: PHYSICIAN ASSISTANT

## 2023-06-06 PROCEDURE — G0438 PPPS, INITIAL VISIT: HCPCS | Performed by: PHYSICIAN ASSISTANT

## 2023-06-06 RX ORDER — ESCITALOPRAM OXALATE 5 MG/1
5 TABLET ORAL DAILY
Qty: 30 TABLET | Refills: 5 | Status: SHIPPED | OUTPATIENT
Start: 2023-06-06

## 2023-06-06 SDOH — ECONOMIC STABILITY - INCOME SECURITY: PROBLEM RELATED TO HOUSING AND ECONOMIC CIRCUMSTANCES, UNSPECIFIED: Z59.9

## 2023-06-06 SDOH — ECONOMIC STABILITY - TRANSPORTATION SECURITY: TRANSPORTATION INSECURITY: Z59.82

## 2023-06-06 NOTE — PROGRESS NOTES
Assessment and Plan:     Patient Instructions   Assessment/plan:  1  CVA-patient has been stable with atorvastatin and warfarin therapy  Patient has follow-up with neurology in August  2  Persistent atrial fibrillation-stable on warfarin and rate control with diltiazem and metoprolol and digoxin  Patient follows with cardiology in July  3  Hyperlipidemia-stable on statin therapy, no medication changes  4   Recurrent major depression-not at goal   Patient is struggling through a an episode  Would recommend starting on Lexapro 5 mg daily  Possible interaction with warfarin may known to patient  Recommend continued monitoring weekly until dosage can be adjusted properly  Patient is having financial concerns and living off of Social Security  She is having difficulty with transportation since she had a stroke and is unable to drive  Would recommend  to reach out for any community services that would be helpful with transportation issues or financial concerns  Also consider help with behavioral health/counseling coordination in the future  5   Foot edema-this is trace amount  Likely venous insufficiency bilaterally  Recommend support stockings, avoiding salt, and elevating feet  6   Healthcare maintenance-annual Medicare wellness visit questionnaire completed  Recommend follow-up in 1 month to reevaluate depression therapy            Problem List Items Addressed This Visit        Cardiovascular and Mediastinum    Cerebrovascular accident (CVA) due to embolism of cerebral artery ([facility] [address] ) - Primary    Persistent atrial fibrillation (HCC)       Other    Depression, recurrent ([facility] [address] )    Relevant Medications    escitalopram (LEXAPRO) 5 mg tablet    Hypercholesterolemia   Other Visit Diagnoses     Healthcare maintenance        Financial difficulties        Relevant Orders    Ambulatory referral to social work care management program    Inability to acquire transportation        Relevant Orders Ambulatory referral to social work care management program    Screen for colon cancer        Relevant Orders    Cologuard    Encounter for screening mammogram for malignant neoplasm of breast        Relevant Orders    Mammo screening bilateral w 3d & cad    Screening for cervical cancer        Relevant Orders    Ambulatory Referral to Gynecology           Preventive health issues were discussed with patient, and age appropriate screening tests were ordered as noted in patient's After Visit Summary  Personalized health advice and appropriate referrals for health education or preventive services given if needed, as noted in patient's After Visit Summary  History of Present Illness:     Patient presents for a Medicare Wellness Visit    HPI: This is a 59-year-old female who presents to the office for follow-up of chronic health conditions as well as annual Medicare wellness visit  She does complain of feeling down and depressed and having some increased anxiety symptoms  She does have history of recurrent depression but is not currently on medication for this  She has also had history of stroke related to cerebral embolism from atrial fibrillation  Her atrial fibrillation is treated by cardiology with digoxin, diltiazem, metoprolol, and warfarin  She has also noted bilateral foot swelling  Patient is not able to drive currently and relies on others for transportation  She feels that she just sits at home and does nothing and is afraid to go outside because she has quit smoking and across from her house there is a high-rise where there are people smoking constantly  She feels anxious about using a city bus on her own  She feels that she is a burden to others and relying on others too much to help her with things       Patient Care Team:  Vivian Lopez PA-C as PCP - General (Family Medicine)  Mary Ann Degroot MD (Cardiology)  Vivien Braga MD (Neurology)     Review of Systems:     Review of Systems Constitutional: Negative for chills, fatigue and fever  HENT: Negative for congestion, ear pain and sinus pressure  Eyes: Negative for visual disturbance  Respiratory: Negative for cough, chest tightness and shortness of breath  Cardiovascular: Negative for chest pain and palpitations  Gastrointestinal: Negative for diarrhea, nausea and vomiting  Endocrine: Negative for polyuria  Genitourinary: Negative for dysuria and frequency  Musculoskeletal: Negative for arthralgias and myalgias  Skin: Negative for pallor and rash  Neurological: Negative for dizziness, weakness, light-headedness, numbness and headaches  Psychiatric/Behavioral: Positive for dysphoric mood  Negative for agitation, behavioral problems, self-injury, sleep disturbance and suicidal ideas  The patient is nervous/anxious  All other systems reviewed and are negative  Problem List:     Patient Active Problem List   Diagnosis   • Allergic rhinitis   • Depression, recurrent ([facility] [address] )   • Hypercholesterolemia   • Tobacco abuse   • Long QT interval   • Tremor of both hands   • Head movements abnormal   • Acute upper respiratory infection   • Persistent atrial fibrillation (HCC)   • Cerebrovascular accident (CVA) due to embolism of cerebral artery (HCC)   • Migraine with aura and without status migrainosus, not intractable   • Abnormal magnetic resonance imaging of kidney   • Cerebral edema (HCC)   • PVC's (premature ventricular contractions)      Past Medical and Surgical History:     Past Medical History:   Diagnosis Date   • Migraine    • Premature supraventricular beats      History reviewed  No pertinent surgical history  Family History:     Family History   Problem Relation Age of Onset   • Colon cancer Father    • Colon cancer Brother       Social History:     Social History     Socioeconomic History   • Marital status:       Spouse name: None   • Number of children: 1   • Years of education: None   • Highest education level: None   Occupational History   • Occupation: EMPLOYED   Tobacco Use   • Smoking status: Former     Packs/day: 1 00     Years: 40 00     Total pack years: 40 00     Types: Cigarettes     Quit date: 3/1/2023     Years since quittin 2   • Smokeless tobacco: Never   Vaping Use   • Vaping Use: Every day   Substance and Sexual Activity   • Alcohol use: No     Comment: (HISTORY)   • Drug use: No   • Sexual activity: Not Currently   Other Topics Concern   • None   Social History Narrative     AS PER [employer]     Social Determinants of Health     Financial Resource Strain: Medium Risk (2023)    Overall Financial Resource Strain (CARDIA)    • Difficulty of Paying Living Expenses: Somewhat hard   Food Insecurity: No Food Insecurity (3/7/2023)    Hunger Vital Sign    • Worried About Running Out of Food in the Last Year: Never true    • Ran Out of Food in the Last Year: Never true   Transportation Needs: Unmet Transportation Needs (2023)    PRAPARE - Transportation    • Lack of Transportation (Medical):  Yes    • Lack of Transportation (Non-Medical): Yes   Physical Activity: Not on file   Stress: Not on file   Social Connections: Not on file   Intimate Partner Violence: Not on file   Housing Stability: Low Risk  (3/7/2023)    Housing Stability Vital Sign    • Unable to Pay for Housing in the Last Year: No    • Number of Places Lived in the Last Year: 1    • Unstable Housing in the Last Year: No      Medications and Allergies:     Current Outpatient Medications   Medication Sig Dispense Refill   • atorvastatin (LIPITOR) 40 mg tablet Take 1 tablet (40 mg total) by mouth daily with dinner 30 tablet 2   • digoxin (LANOXIN) 0 125 mg tablet Take 1 tablet (125 mcg total) by mouth daily 30 tablet 2   • diltiazem (CARDIZEM) 120 MG tablet take 1 tablet by mouth every 12 hours 30 tablet 2   • escitalopram (LEXAPRO) 5 mg tablet Take 1 tablet (5 mg total) by mouth daily 30 tablet 5   • metoprolol succinate (TOPROL-XL) 25 mg 24 hr tablet Take 1 tablet (25 mg total) by mouth every 12 (twelve) hours 60 tablet 2   • warfarin (Coumadin) 5 mg tablet Take 1 tablet (5 mg total) by mouth daily 30 tablet 5     No current facility-administered medications for this visit  No Known Allergies   Immunizations: There is no immunization history on file for this patient  Health Maintenance:         Topic Date Due   • Hepatitis C Screening  Never done   • Cervical Cancer Screening  Never done   • Breast Cancer Screening: Mammogram  Never done   • Colorectal Cancer Screening  Never done   • Lung Cancer Screening  03/08/2024         Topic Date Due   • COVID-19 Vaccine (1) Never done   • Pneumococcal Vaccine: 65+ Years (1 - PCV) Never done   • Influenza Vaccine (Season Ended) 09/01/2023      Medicare Screening Tests and Risk Assessments:     Magaly Anaya is here for her Initial Wellness visit  Health Risk Assessment:   Patient rates overall health as fair  Patient feels that their physical health rating is slightly worse  Patient is dissatisfied with their life  Eyesight was rated as slightly worse  Hearing was rated as same  Patient feels that their emotional and mental health rating is slightly worse  Patients states they are sometimes angry  Patient states they are sometimes unusually tired/fatigued  Pain experienced in the last 7 days has been some  Patient's pain rating has been 5/10  Patient states that she has experienced weight loss or gain in last 6 months  Depression Screening:   PHQ-9 Score: 13      Fall Risk Screening: In the past year, patient has experienced: history of falling in past year    Number of falls: 1  Injured during fall?: Yes    Feels unsteady when standing or walking?: No    Worried about falling?: Yes      Urinary Incontinence Screening:   Patient has not leaked urine accidently in the last six months  Home Safety:  Patient does not have trouble with stairs inside or outside of their home  Patient has working smoke alarms and has no working carbon monoxide detector  Home safety hazards include: none  Nutrition:   Current diet is Frequent junk food  Medications:   Patient is currently taking over-the-counter supplements  OTC medications include: see medication list  Patient is able to manage medications  Activities of Daily Living (ADLs)/Instrumental Activities of Daily Living (IADLs):   Walk and transfer into and out of bed and chair?: Yes  Dress and groom yourself?: Yes    Bathe or shower yourself?: Yes    Feed yourself?  Yes  Do your laundry/housekeeping?: Yes  Manage your money, pay your bills and track your expenses?: No  Make your own meals?: Yes    Do your own shopping?: No    Previous Hospitalizations:   Any hospitalizations or ED visits within the last 12 months?: Yes    How many hospitalizations have you had in the last year?: 1-2    Advance Care Planning:   Living will: No    Durable POA for healthcare: No    Advanced directive: No    Advanced directive counseling given: Yes    Five wishes given: Yes      Cognitive Screening:   Provider or family/friend/caregiver concerned regarding cognition?: No    PREVENTIVE SCREENINGS      Cardiovascular Screening:    General: Screening Not Indicated and History Lipid Disorder      Diabetes Screening:     General: Screening Current      Colorectal Cancer Screening:     General: Risks and Benefits Discussed and Patient Declines      Breast Cancer Screening:     General: Risks and Benefits Discussed and Patient Declines      Cervical Cancer Screening:    General: Screening Not Indicated      Osteoporosis Screening:    General: Risks and Benefits Discussed and Patient Declines      Abdominal Aortic Aneurysm (AAA) Screening:        General: Risks and Benefits Discussed and Patient Declines      Lung Cancer Screening:     General: Screening Current      Hepatitis C Screening:    General: Risks and Benefits Discussed and Patient "Declines    Screening, Brief Intervention, and Referral to Treatment (SBIRT)    Screening  Typical number of drinks in a day: 0  Typical number of drinks in a week: 0  Interpretation: Low risk drinking behavior  Single Item Drug Screening:  How often have you used an illegal drug (including marijuana) or a prescription medication for non-medical reasons in the past year? never    Single Item Drug Screen Score: 0  Interpretation: Negative screen for possible drug use disorder    No results found  Physical Exam:     /60 (BP Location: Left arm, Patient Position: Sitting, Cuff Size: Standard)   Pulse 60   Ht 5' 4\" (1 626 m)   Wt 66 2 kg (146 lb)   SpO2 99%   BMI 25 06 kg/m²     Physical Exam  Constitutional:       General: She is not in acute distress  Appearance: Normal appearance  HENT:      Head: Normocephalic and atraumatic  Right Ear: Tympanic membrane normal       Left Ear: Tympanic membrane normal       Nose: No congestion or rhinorrhea  Eyes:      Conjunctiva/sclera: Conjunctivae normal       Pupils: Pupils are equal, round, and reactive to light  Neck:      Vascular: No carotid bruit  Cardiovascular:      Rate and Rhythm: Normal rate and regular rhythm  Heart sounds: No murmur heard  Pulmonary:      Effort: Pulmonary effort is normal  No respiratory distress  Breath sounds: Normal breath sounds  Abdominal:      Palpations: Abdomen is soft  Musculoskeletal:         General: Normal range of motion  Cervical back: Normal range of motion and neck supple  No muscular tenderness  Lymphadenopathy:      Cervical: No cervical adenopathy  Skin:     General: Skin is warm  Capillary Refill: Capillary refill takes less than 2 seconds  Neurological:      General: No focal deficit present  Mental Status: She is alert and oriented to person, place, and time     Psychiatric:         Mood and Affect: Mood normal           Azalea Ruano PA-C  "

## 2023-06-06 NOTE — TELEPHONE ENCOUNTER
[facility] pharmacy called advising contra indication between patient Lexapro, warfarin and metoprolol  Pharmacist was advised to fill medication, provider made aware

## 2023-06-06 NOTE — PATIENT INSTRUCTIONS
Assessment/plan:  1  CVA-patient has been stable with atorvastatin and warfarin therapy  Patient has follow-up with neurology in August  2  Persistent atrial fibrillation-stable on warfarin and rate control with diltiazem and metoprolol and digoxin  Patient follows with cardiology in July  3  Hyperlipidemia-stable on statin therapy, no medication changes  4   Recurrent major depression-not at goal   Patient is struggling through a an episode  Would recommend starting on Lexapro 5 mg daily  Possible interaction with warfarin may known to patient  Recommend continued monitoring weekly until dosage can be adjusted properly  Patient is having financial concerns and living off of Social Security  She is having difficulty with transportation since she had a stroke and is unable to drive  Would recommend  to reach out for any community services that would be helpful with transportation issues or financial concerns  Also consider help with behavioral health/counseling coordination in the future  5   Foot edema-this is trace amount  Likely venous insufficiency bilaterally  Recommend support stockings, avoiding salt, and elevating feet  6   Healthcare maintenance-annual Medicare wellness visit questionnaire completed  Recommend follow-up in 1 month to reevaluate depression therapy

## 2023-06-08 ENCOUNTER — TELEPHONE (OUTPATIENT)
Dept: FAMILY MEDICINE CLINIC | Facility: CLINIC | Age: 68
End: 2023-06-08

## 2023-06-08 ENCOUNTER — PATIENT OUTREACH (OUTPATIENT)
Dept: FAMILY MEDICINE CLINIC | Facility: CLINIC | Age: 68
End: 2023-06-08

## 2023-06-12 ENCOUNTER — PATIENT OUTREACH (OUTPATIENT)
Dept: FAMILY MEDICINE CLINIC | Facility: CLINIC | Age: 68
End: 2023-06-12

## 2023-06-12 NOTE — PROGRESS NOTES
OP CM called to pt again and LM in regards to consult for transportation  [facility]

## 2023-06-12 NOTE — LETTER
June 12, 2023     Cori Villanueva    Patient: Cori Villanueva   YOB: 1955   Date of Visit:        Dear Mrs Nguyen:    I am the  that covers [facility]  I was reaching out to you to see if you need assistance with transportation  Please feel free to call me at [phone]          Sincerely,        MERYL Castellanos        CC: No Recipients

## 2023-06-13 ENCOUNTER — APPOINTMENT (OUTPATIENT)
Dept: LAB | Facility: HOSPITAL | Age: 68
End: 2023-06-13
Attending: INTERNAL MEDICINE
Payer: MEDICARE

## 2023-06-13 ENCOUNTER — ANTICOAG VISIT (OUTPATIENT)
Dept: CARDIOLOGY CLINIC | Facility: CLINIC | Age: 68
End: 2023-06-13

## 2023-06-20 ENCOUNTER — ANTICOAG VISIT (OUTPATIENT)
Dept: CARDIOLOGY CLINIC | Facility: CLINIC | Age: 68
End: 2023-06-20

## 2023-06-20 ENCOUNTER — APPOINTMENT (OUTPATIENT)
Dept: LAB | Facility: HOSPITAL | Age: 68
End: 2023-06-20
Attending: INTERNAL MEDICINE
Payer: MEDICARE

## 2023-06-20 NOTE — PROGRESS NOTES
Left message for Don Simmons, advised INR a little low, advised to have patient take 7 5 mg tonight only instead of 5 mg then go back to 2 5 mg Mon Wed Fri, 5 mg all other days, will recheck next week 6/27/23  Advised to call with questions or concerns

## 2023-06-21 ENCOUNTER — PATIENT OUTREACH (OUTPATIENT)
Dept: FAMILY MEDICINE CLINIC | Facility: CLINIC | Age: 68
End: 2023-06-21

## 2023-06-21 DIAGNOSIS — Z78.9 NEEDS ASSISTANCE WITH COMMUNITY RESOURCES: Primary | ICD-10-CM

## 2023-06-21 NOTE — PROGRESS NOTES
MAGDA CHOUDHURY received a voicemail from patient requesting a call in return on 6/20/23  On 6/21/23 MAGDA CHOUDHURY contacted patient, however, patient could not seem to remember the reason for her call or what she needed assistance with  MAGDA CHOUDHURY encouraged patient to call back when she is able to remember  --    MAGDA CHOUDHURY received a call in return from patient  Patient states her niece currently takes her to medical appointments so she does not need transportation assistance at this time  Patient does state that she has some financial difficulties  She recieves $1,090 in social security, however, she pays $795 in rent which does not leave much left for medications, food and bills  Patient statges she is behind on both PPL and UGI bills-she has signed up for payment plans  Patient states she is interested in assistance with completing application for Medicaid as a secondary insurance as well as SNAP  Patient state she is still suffering from effects from her stroke-she has difficulty seeing things, completing paperwork on her own, and she tends to be forgetful  Denies needing assistance with ADLs  MAGDA CHOUDHURY explained that [facility] referral will be placed   MAGDA CHOUDHURY has added SW CM Dino Merlin to the care team

## 2023-06-27 ENCOUNTER — PATIENT MESSAGE (OUTPATIENT)
Dept: FAMILY MEDICINE CLINIC | Facility: CLINIC | Age: 68
End: 2023-06-27

## 2023-06-27 ENCOUNTER — APPOINTMENT (OUTPATIENT)
Dept: LAB | Facility: HOSPITAL | Age: 68
End: 2023-06-27
Attending: INTERNAL MEDICINE
Payer: MEDICARE

## 2023-06-27 ENCOUNTER — ANTICOAG VISIT (OUTPATIENT)
Dept: CARDIOLOGY CLINIC | Facility: CLINIC | Age: 68
End: 2023-06-27

## 2023-06-27 ENCOUNTER — PATIENT OUTREACH (OUTPATIENT)
Dept: FAMILY MEDICINE CLINIC | Facility: CLINIC | Age: 68
End: 2023-06-27

## 2023-06-27 NOTE — PROGRESS NOTES
Left message for Wiley Addison, advised INR good, continue 2 5 mg Mon Wed Fri, 5 mg all other days, can recheck in 2 weeks 7/11/23  Advised to call with questions or concerns

## 2023-06-27 NOTE — PROGRESS NOTES
[facility] completed a chart review prior to contacting patient  [facility] called patient to discuss referral received  Referral need: Medical assistant and SNAP    Unable to reach patient, left a message requesting a return call  If no return call is received [facility] will make a second attempt to reach patient in about a week      Anticipated next contact date: 7/4/23

## 2023-07-02 DIAGNOSIS — I63.40 CEREBROVASCULAR ACCIDENT (CVA) DUE TO EMBOLISM OF CEREBRAL ARTERY (HCC): ICD-10-CM

## 2023-07-02 DIAGNOSIS — I48.91 NEW ONSET A-FIB (HCC): ICD-10-CM

## 2023-07-03 ENCOUNTER — ANTICOAG VISIT (OUTPATIENT)
Dept: CARDIOLOGY CLINIC | Facility: CLINIC | Age: 68
End: 2023-07-03

## 2023-07-03 ENCOUNTER — APPOINTMENT (OUTPATIENT)
Dept: LAB | Facility: HOSPITAL | Age: 68
End: 2023-07-03
Attending: INTERNAL MEDICINE
Payer: MEDICARE

## 2023-07-03 RX ORDER — ATORVASTATIN CALCIUM 40 MG/1
TABLET, FILM COATED ORAL
Qty: 30 TABLET | Refills: 5 | Status: SHIPPED | OUTPATIENT
Start: 2023-07-03

## 2023-07-03 RX ORDER — DILTIAZEM HYDROCHLORIDE 120 MG/1
TABLET, FILM COATED ORAL
Qty: 60 TABLET | Refills: 5 | Status: SHIPPED | OUTPATIENT
Start: 2023-07-03

## 2023-07-03 RX ORDER — METOPROLOL SUCCINATE 25 MG/1
TABLET, EXTENDED RELEASE ORAL
Qty: 60 TABLET | Refills: 5 | Status: SHIPPED | OUTPATIENT
Start: 2023-07-03

## 2023-07-03 RX ORDER — DIGOXIN 125 MCG
TABLET ORAL
Qty: 30 TABLET | Refills: 5 | Status: SHIPPED | OUTPATIENT
Start: 2023-07-03

## 2023-07-05 ENCOUNTER — PATIENT OUTREACH (OUTPATIENT)
Dept: FAMILY MEDICINE CLINIC | Facility: CLINIC | Age: 68
End: 2023-07-05

## 2023-07-05 NOTE — LETTER
07/05/23    Dear Jude Prado,    I am a  with [facility]. I have made several attempts to contact you by phone. Please call me back at your earliest convenient time so that I can assist you with your care needs.  My direct number is [phone]    Sincerely,         María Chowdhury

## 2023-07-05 NOTE — PROGRESS NOTES
[facility] completed a chart review prior to contacting patient. [facility] called patient to discuss referral received. Referral need: Medical assistance and SNAP. Unable to reach patient, left a message requesting a call back. If no return call is received [facility] will make a third attempt to contact patient in about 2 weeks. Unable to reach letter sent to patients my chart account.     Anticipated next contact date: 7/19/23

## 2023-07-06 ENCOUNTER — PATIENT OUTREACH (OUTPATIENT)
Dept: FAMILY MEDICINE CLINIC | Facility: CLINIC | Age: 68
End: 2023-07-06

## 2023-07-06 NOTE — PROGRESS NOTES
[facility] received a voicemail from patient apologizing for missing [facility] [employer] call and requesting a call back. [employer] completed a chart review prior to contacting patient. [facility] patient to discuss referral received. Referral need: SNAP/ Medical assistance    [facility] introduced myself and explained my role. Patient agrees to CHW services. [facility] offer patient options to complete application for Medical assistance, in person, over the phone or online with assistance of [employer] [facility] [employer]. Patient agreed on completing application online with the assistance of [facility]. [facility] successfully completed and submitted application for medical assistance and SNAP benefits on the [employer] web site while on the phone with patient. All information provided and confirmed by patient. Application number#   E-form [id number]    Patient to provide the following required documents no later than August 6. Identification, Medical bills, proof of income( Bank statements) or social security award letter. Electric bill, Gas bill,telephone bill, Rent receipt or lease. Documents to be mailed out to the [address] [place] [address]., [address] [place] [address], [address] [place] [address]. Patient verbalized understanding and was agreeable with the plan. List of required documents sent to patient's daughter emailed per patient's request.    Patient will contact [employer] with any questions or concerns, [employer] will continue to follow up with patient and assist as needed.     Anticipated next contact date: 7/13/23

## 2023-07-09 PROBLEM — Z87.891 HISTORY OF TOBACCO ABUSE: Status: ACTIVE | Noted: 2017-03-01

## 2023-07-11 ENCOUNTER — OFFICE VISIT (OUTPATIENT)
Dept: CARDIOLOGY CLINIC | Facility: CLINIC | Age: 68
End: 2023-07-11
Payer: MEDICARE

## 2023-07-11 VITALS
DIASTOLIC BLOOD PRESSURE: 59 MMHG | SYSTOLIC BLOOD PRESSURE: 110 MMHG | WEIGHT: 144.4 LBS | BODY MASS INDEX: 24.79 KG/M2 | HEART RATE: 49 BPM

## 2023-07-11 DIAGNOSIS — I49.3 PVC'S (PREMATURE VENTRICULAR CONTRACTIONS): ICD-10-CM

## 2023-07-11 DIAGNOSIS — R94.31 LONG QT INTERVAL: ICD-10-CM

## 2023-07-11 DIAGNOSIS — G43.109 MIGRAINE WITH AURA AND WITHOUT STATUS MIGRAINOSUS, NOT INTRACTABLE: ICD-10-CM

## 2023-07-11 DIAGNOSIS — I48.19 PERSISTENT ATRIAL FIBRILLATION (HCC): ICD-10-CM

## 2023-07-11 DIAGNOSIS — E78.00 HYPERCHOLESTEROLEMIA: ICD-10-CM

## 2023-07-11 DIAGNOSIS — R25.1 TREMOR OF BOTH HANDS: ICD-10-CM

## 2023-07-11 DIAGNOSIS — Z87.891 HISTORY OF TOBACCO ABUSE: ICD-10-CM

## 2023-07-11 DIAGNOSIS — I63.40 CEREBROVASCULAR ACCIDENT (CVA) DUE TO EMBOLISM OF CEREBRAL ARTERY (HCC): Primary | ICD-10-CM

## 2023-07-11 PROCEDURE — 93000 ELECTROCARDIOGRAM COMPLETE: CPT | Performed by: INTERNAL MEDICINE

## 2023-07-11 PROCEDURE — 99214 OFFICE O/P EST MOD 30 MIN: CPT | Performed by: INTERNAL MEDICINE

## 2023-07-11 NOTE — PROGRESS NOTES
[facility]  [address] [place] [address] [address], [address] [facility] [address]  Phone#  [phone]   Fax#  [phone]  *-*-*-*-*-*-*-*-*-*-*-*-*-*-*-*-*-*-*-*-*-*-*-*-*-*-*-*-*-*-*-*-*-*-*-*-*-*-*-*-*-*-*-*-*-*-*-*-*-*-*-*-*-*                                   Cardiology Follow Up      ENCOUNTER DATE: 23 9:45 AM  PATIENT NAME: Philipp Clemente   : 1955    MRN: [id number]  AGE:68 y.o. SEX: female  [address] MD Erika     PRIMARY CARE PHYSICIAN: Sarabjit Payne PA-C    ACTIVE DIAGNOSIS THIS VISIT  1. Cerebrovascular accident (CVA) due to embolism of cerebral artery (HCC)        2. Persistent atrial fibrillation ([address])  POCT ECG      3. Hypercholesterolemia        4. History of tobacco abuse with recent cessation        5. PVC's (premature ventricular contractions)        6. Tremor of both hands        7. Long QT interval        8. Migraine with aura and without status migrainosus, not intractable          ACTIVE PROBLEM LIST  Patient Active Problem List   Diagnosis   • Allergic rhinitis   • Depression, recurrent ([address])   • Hypercholesterolemia   • History of tobacco abuse with recent cessation   • Long QT interval   • Tremor of both hands   • Head movements abnormal   • Acute upper respiratory infection   • Persistent atrial fibrillation (HCC)   • Cerebrovascular accident (CVA) due to embolism of cerebral artery (HCC)   • Migraine with aura and without status migrainosus, not intractable   • Abnormal magnetic resonance imaging of kidney   • Cerebral edema (HCC)   • PVC's (premature ventricular contractions)       CARDIOLOGY SPECIALTY COMMENTS  3/6-3/11/2023 [facility]: 27-year-old female presents for follow-up post recent hospitalization. Patient presenting with a headache. She was found to have embolic strokes and newly diagnosed atrial fibrillation.  Patient without insurance coverage for NOAC so she was initiated on Coumadin and follows with the Coumadin clinic.    2023 echocardiogram: Normal left ventricular systolic function, EF 46%. RVE, CIRO AND LAE. Mild TR.    4/28/2023 ambulatory extended Holter monitor:  - Atrial Fibrillation occurred continuously (100% burden), ranging from 39-  143 bpm (avg of 69 bpm). - Isolated VEs were rare (<1.0%, 5353), VE Couplets were rare (<1.0%, 195), and VE Triplets were rare (<1.0%, 7). Ventricular Bigeminy and Trigeminy were present. INTERVAL HISTORY:        Patient with a history of new onset atrial fibrillation and cerebrovascular accident due to embolic event 6/4/3346. She is on Coumadin anticoagulation. An ambulatory extended Holter monitor demonstrated an atrial fibrillation burden of 100% with heart rate ranging from 39 to 143 bpm and average rate of 69 bpm.  Today patient's resting heart rate is 49 bpm.    Over the last several weeks with the increase in heat for the summer, she has had some mild swelling of her ankles. Her left ankle is slightly more swollen than her right ankle. However neither are more than trace edema.     DISCUSSION/PLAN:          · Decrease digoxin 125 mcg to 1 tablet every other day  · Return in 6 weeks  · EKG on return      Lab Studies:    Lab Results   Component Value Date    CHOLESTEROL 227 (H) 03/06/2023    CHOLESTEROL 268 (H) 04/26/2019     Lab Results   Component Value Date    TRIG 73 03/06/2023    TRIG 85 04/26/2019     Lab Results   Component Value Date    HDL 54 03/06/2023    HDL 65 04/26/2019     Lab Results   Component Value Date    LDLCALC 158 (H) 03/06/2023    LDLCALC 184 (H) 04/26/2019     Lab Results   Component Value Date    NONHDL 203 (H) 04/26/2019       Lab Results   Component Value Date    HGBA1C 5.6 03/06/2023      Lab Results   Component Value Date    EGFR 96 03/11/2023    EGFR 97 03/07/2023    EGFR 93 03/06/2023    SODIUM 139 03/11/2023    SODIUM 138 03/07/2023    SODIUM 137 03/06/2023    K 4.0 03/11/2023    K 3.8 03/07/2023    K 3.8 03/06/2023     03/11/2023     03/07/2023     03/06/2023    CO2 26 03/11/2023    CO2 23 03/07/2023    CO2 22 03/06/2023    BUN 12 03/11/2023    BUN 10 03/07/2023    BUN 12 03/06/2023    CREATININE 0.56 (L) 03/11/2023    CREATININE 0.54 (L) 03/07/2023    CREATININE 0.62 03/06/2023     Lab Results   Component Value Date    WBC 9.65 03/11/2023    WBC 11.05 (H) 03/07/2023    WBC 11.26 (H) 03/06/2023    HGB 13.2 03/11/2023    HGB 12.8 03/07/2023    HGB 13.5 03/06/2023    HCT 40.4 03/11/2023    HCT 38.4 03/07/2023    HCT 40.6 03/06/2023    MCV 88 03/11/2023    MCV 88 03/07/2023    MCV 86 03/06/2023    MCH 28.9 03/11/2023    MCH 29.3 03/07/2023    MCH 28.6 03/06/2023    MCHC 32.7 03/11/2023    MCHC 33.3 03/07/2023    MCHC 33.3 03/06/2023     03/11/2023     03/07/2023     03/06/2023      Lab Results   Component Value Date    CALCIUM 9.1 03/11/2023    CALCIUM 8.7 03/07/2023    CALCIUM 9.3 03/06/2023    AST 21 03/06/2023    AST 21 04/26/2019    ALT 12 03/06/2023    ALT 15 04/26/2019    ALKPHOS 72 03/06/2023    ALKPHOS 64 04/26/2019    MG 2.0 03/06/2023       Lab Results   Component Value Date    DIGOXIN 1.5 04/11/2023    DIGOXIN 0.9 03/11/2023     Results for orders placed or performed in visit on 07/11/23   POCT ECG    Narrative    Atrial fibrillation with a slow ventricular response of 49 bpm.  Competing junctional pacemaker. Incomplete right abnormal EKG.          Current Outpatient Medications:   •  atorvastatin (LIPITOR) 40 mg tablet, take 1 tablet by mouth once daily with dinner, Disp: 30 tablet, Rfl: 5  •  digoxin (LANOXIN) 0.125 mg tablet, take 1 tablet by mouth once daily, Disp: 30 tablet, Rfl: 5  •  diltiazem (CARDIZEM) 120 MG tablet, take 1 tablet by mouth every 12 hours, Disp: 60 tablet, Rfl: 5  •  escitalopram (LEXAPRO) 5 mg tablet, Take 1 tablet (5 mg total) by mouth daily, Disp: 30 tablet, Rfl: 5  •  metoprolol succinate (TOPROL-XL) 25 mg 24 hr tablet, take 1 tablet by mouth every 12 hours, Disp: 60 tablet, Rfl: 5  •  warfarin (Coumadin) 5 mg tablet, Take 1 tablet (5 mg total) by mouth daily, Disp: 30 tablet, Rfl: 5  No Known Allergies    Past Medical History:   Diagnosis Date   • Migraine    • Premature supraventricular beats      Social History     Socioeconomic History   • Marital status:      Spouse name: Not on file   • Number of children: 1   • Years of education: Not on file   • Highest education level: Not on file   Occupational History   • Occupation: EMPLOYED   Tobacco Use   • Smoking status: Former     Packs/day: 1.00     Years: 40.00     Total pack years: 40.00     Types: Cigarettes     Quit date: 3/1/2023     Years since quittin.3   • Smokeless tobacco: Never   Vaping Use   • Vaping Use: Every day   Substance and Sexual Activity   • Alcohol use: No     Comment: (HISTORY)   • Drug use: No   • Sexual activity: Not Currently   Other Topics Concern   • Not on file   Social History Narrative     [facility]     Social Determinants of Health     Financial Resource Strain: Medium Risk (2023)    Overall Financial Resource Strain (CARDIA)    • Difficulty of Paying Living Expenses: Somewhat hard   Food Insecurity: No Food Insecurity (3/7/2023)    Hunger Vital Sign    • Worried About Running Out of Food in the Last Year: Never true    • Ran Out of Food in the Last Year: Never true   Transportation Needs: Unmet Transportation Needs (2023)    PRAPARE - Transportation    • Lack of Transportation (Medical):  Yes    • Lack of Transportation (Non-Medical): Yes   Physical Activity: Not on file   Stress: Not on file   Social Connections: Not on file   Intimate Partner Violence: Not on file   Housing Stability: Low Risk  (3/7/2023)    Housing Stability Vital Sign    • Unable to Pay for Housing in the Last Year: No    • Number of Places Lived in the Last Year: 1    • Unstable Housing in the Last Year: No      Family History   Problem Relation Age of Onset   • Colon cancer Father    • Colon cancer Brother      History reviewed. No pertinent surgical history. PREVIOUS WEIGHTS:   Wt Readings from Last 10 Encounters:   07/11/23 65.5 kg (144 lb 6.4 oz)   06/06/23 66.2 kg (146 lb)   04/04/23 67.6 kg (149 lb)   03/17/23 70.3 kg (155 lb)   03/11/23 69.7 kg (153 lb 9.6 oz)   03/07/23 78.4 kg (172 lb 13.5 oz)   03/05/23 70.7 kg (155 lb 13.8 oz)   11/11/19 69.9 kg (154 lb 3.2 oz)   04/30/19 65 kg (143 lb 6.4 oz)   04/29/19 66.2 kg (146 lb)        Review of Systems:  Review of Systems   Respiratory: Negative for cough, choking, chest tightness, shortness of breath and wheezing. Cardiovascular: Positive for leg swelling. Negative for chest pain and palpitations. Musculoskeletal: Negative for gait problem. Skin: Negative for rash. Neurological: Negative for dizziness, tremors, syncope, weakness, light-headedness, numbness and headaches. Psychiatric/Behavioral: Negative for agitation and behavioral problems. The patient is not hyperactive. Physical Exam:  /59 (BP Location: Right arm, Patient Position: Sitting, Cuff Size: Adult)   Pulse (!) 49   Wt 65.5 kg (144 lb 6.4 oz)   BMI 24.79 kg/m²     Physical Exam  Constitutional:       General: She is not in acute distress. Appearance: She is well-developed. HENT:      Head: Normocephalic and atraumatic. Neck:      Thyroid: No thyromegaly. Vascular: No carotid bruit or JVD. Trachea: No tracheal deviation. Cardiovascular:      Rate and Rhythm: Normal rate and regular rhythm. Pulses: Normal pulses. Heart sounds: Normal heart sounds. No murmur heard. No friction rub. No gallop. Pulmonary:      Effort: Pulmonary effort is normal. No respiratory distress. Breath sounds: Normal breath sounds. No wheezing, rhonchi or rales. Chest:      Chest wall: No tenderness. Abdominal:      Palpations: Abdomen is soft. Musculoskeletal:         General: Normal range of motion.       Cervical back: Normal range of motion and neck supple. Comments: Trace bilateral ankle edema more on left side. Skin:     General: Skin is warm and dry. Neurological:      General: No focal deficit present. Mental Status: She is alert and oriented to person, place, and time. Psychiatric:         Mood and Affect: Mood normal.         Behavior: Behavior normal.         Thought Content: Thought content normal.         Judgment: Judgment normal.       ======================================================  Imaging:   I have personally reviewed pertinent reports. I spent 30 minutes on the patient's office visit. This time was spent on the day of the visit. I had direct contact with the patient in the office on the day of the visit. Greater than 50% of the total time was spent obtaining a history, examining patient, answering all patient questions, arranging and discussing plan of care with patient as well as directly providing instructions. Additional time then spent on orders and office chart. Portions of the record may have been created with voice recognition software. Occasional wrong word or "sound a like" substitutions may have occurred due to the inherent limitations of voice recognition software. Read the chart carefully and recognize, using context, where substitutions have occurred.     SIGNATURES:   Sawyer Elmore MD

## 2023-07-13 ENCOUNTER — PATIENT OUTREACH (OUTPATIENT)
Dept: FAMILY MEDICINE CLINIC | Facility: CLINIC | Age: 68
End: 2023-07-13

## 2023-07-13 NOTE — PROGRESS NOTES
[facility] received a return call from patient. Patient was informed her medical assistant and food stamps application were approved. Per patient she received a call from department of human services  Ms Brigido Toney who informed her she was going to be approved for food stamps and her medical assistance will be paying for the amount is currently being taken out of her Social security to pay for Medicare. Patient express concerns about her past due medical bills with [facility] from when she was hospitalize back in March. Patient concern those bills will be sent out for collection. [facility] offer patient to apply for [facility] Care/[facility]arsh. Patient agreeable to [facility] application. [facility] e-mail Melany  counselor department requesting an application to be mailed out to patient home. [facility] will continue to follow up and assist patient as needed. Patient verbalized understanding and was agreeable to the plan.     Anticipated next contact date:   7/20/23

## 2023-07-13 NOTE — PROGRESS NOTES
[facility] completed a chart review prior to contacting patient. [facility] called patient to follow up on Medical assistance and Snap application. [facility] review statues of application on the [facility] web site. Application was approved as of today. Unable to contact patient, left a message requesting a call back. If no returned call is received, [facility] will make a second attempt to reach patient  in about a week.     Anticipated next contact date:  7/20/23

## 2023-07-18 ENCOUNTER — ANTICOAG VISIT (OUTPATIENT)
Dept: CARDIOLOGY CLINIC | Facility: CLINIC | Age: 68
End: 2023-07-18

## 2023-07-18 ENCOUNTER — APPOINTMENT (OUTPATIENT)
Dept: LAB | Facility: HOSPITAL | Age: 68
End: 2023-07-18
Attending: INTERNAL MEDICINE
Payer: MEDICARE

## 2023-07-18 NOTE — PROGRESS NOTES
attempted to contact Harika Jaimes, unable to leave message, mailbox full  Will attempt later    Will advised INR good, continue 2.5 mg Mon Fri, 5 mg all other days, will recheck in 2 weeks 8/1/23

## 2023-07-19 NOTE — PROGRESS NOTES
7/19/23~attempted to contact Margrette Severance, no answer, unable to leave message, mailbox full. [phone]~was able to speak with Margrette Severance, advised above.

## 2023-07-20 ENCOUNTER — PATIENT OUTREACH (OUTPATIENT)
Dept: FAMILY MEDICINE CLINIC | Facility: CLINIC | Age: 68
End: 2023-07-20

## 2023-07-20 NOTE — PROGRESS NOTES
[address] [place] [address] completed a chart review prior to contacting patient. [address] [place] [address] 165 called patient to follow up on [facility] application. Per patient she received the application in the mailed yesterday. Patient daughter Paula Khan will be assisting patient with  applicationt. [facility] will remain available may any questions or concerns arise. Patient verbalized understanding and was agreeable to the plan.     Anticipated next contact date: 8/3/23

## 2023-07-25 ENCOUNTER — ANTICOAG VISIT (OUTPATIENT)
Dept: CARDIOLOGY CLINIC | Facility: CLINIC | Age: 68
End: 2023-07-25

## 2023-07-25 ENCOUNTER — APPOINTMENT (OUTPATIENT)
Dept: LAB | Facility: HOSPITAL | Age: 68
End: 2023-07-25
Attending: INTERNAL MEDICINE
Payer: MEDICARE

## 2023-07-25 NOTE — PROGRESS NOTES
Attempted to contact Griselda Brock, unable to leave message, mailbox full  Will attempt tomorrow.     Will advised INR good, continue 2.5 mg Mon Fri,. 5 mg all other days, will recheck in 2 weeks 8/8/23

## 2023-07-27 NOTE — PROGRESS NOTES
7/27/23 0830~attempted to contact Sharmaine, no answer, unable to leave message, mailbox full    Was able to leave message on home #, advised above instructions and to call with questions or concerns.  21

## 2023-08-01 ENCOUNTER — APPOINTMENT (OUTPATIENT)
Dept: LAB | Facility: HOSPITAL | Age: 68
End: 2023-08-01
Attending: INTERNAL MEDICINE
Payer: MEDICARE

## 2023-08-01 ENCOUNTER — ANTICOAG VISIT (OUTPATIENT)
Dept: CARDIOLOGY CLINIC | Facility: CLINIC | Age: 68
End: 2023-08-01

## 2023-08-03 ENCOUNTER — PATIENT OUTREACH (OUTPATIENT)
Dept: FAMILY MEDICINE CLINIC | Facility: CLINIC | Age: 68
End: 2023-08-03

## 2023-08-03 NOTE — PROGRESS NOTES
[facility] completed a chart review prior to calling patient. [facility] called patient to follow up on [facility] appplication. Per patient her daughter Jacqueline Ramsey completed the application for her. She is only missing patient W2 before she can mail out application. Per patient, Jacqueline Ramsey will be coming to her home to  missing document. [facility] will continue to follow up and assist patient as needed. Patient verbalized understanding and was agreeable to the plan. Patient will contact [facility] may any questions or concerns arise.     Anticipated next contact date: 8/17

## 2023-08-08 ENCOUNTER — APPOINTMENT (OUTPATIENT)
Dept: LAB | Facility: HOSPITAL | Age: 68
End: 2023-08-08
Attending: INTERNAL MEDICINE
Payer: MEDICARE

## 2023-08-08 ENCOUNTER — ANTICOAG VISIT (OUTPATIENT)
Dept: CARDIOLOGY CLINIC | Facility: CLINIC | Age: 68
End: 2023-08-08

## 2023-08-08 NOTE — PROGRESS NOTES
Left message for Bentley Guerrero, advised INR good, continue 2.5 mg Mon Fri, 5 mg all other days, can recheck in 2 weeks 8/22/23.    Advised to call with questions or concerns

## 2023-08-10 ENCOUNTER — OFFICE VISIT (OUTPATIENT)
Dept: NEUROLOGY | Facility: CLINIC | Age: 68
End: 2023-08-10

## 2023-08-10 VITALS
TEMPERATURE: 98.2 F | DIASTOLIC BLOOD PRESSURE: 64 MMHG | HEIGHT: 64 IN | WEIGHT: 145.2 LBS | OXYGEN SATURATION: 98 % | HEART RATE: 67 BPM | SYSTOLIC BLOOD PRESSURE: 112 MMHG | BODY MASS INDEX: 24.79 KG/M2

## 2023-08-10 DIAGNOSIS — I48.19 PERSISTENT ATRIAL FIBRILLATION (HCC): ICD-10-CM

## 2023-08-10 DIAGNOSIS — E78.00 HYPERCHOLESTEROLEMIA: ICD-10-CM

## 2023-08-10 DIAGNOSIS — I63.40 CEREBROVASCULAR ACCIDENT (CVA) DUE TO EMBOLISM OF CEREBRAL ARTERY (HCC): Primary | ICD-10-CM

## 2023-08-10 NOTE — PROGRESS NOTES
Patient ID: Jm Malhotra is a 80 y.o. female who presents to the [address] [place] [address]. 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 She is presenting today for a Hospital follow-up in regards to her most recent stroke that affected the left occipital lobe. In regards to residual deficits, she does have some right homonymous hemianopsia from the recent infarct that she had. She does have a significant visual field cut out of her right visual field. No new strokelike symptoms or any other residual symptoms of stroke at this time. Patient has still not been driving and she has had her license revoked by [employer] at this time. She is not ready to drive at this point in time, however have explained to her the steps of potentially getting her license back. If she felt that she would be willing to drive in the future, we would need her to have an ophthalmology evaluation and a fitness to drive before we could even consider her getting her [place] 's license back. -Before next visit I would recommend is that Abe Milton has a lipid panel. She can have his lipid panel done at home when they come to get her PT/INR drawls for her Coumadin. We can take a look at this and make sure that the atorvastatin that she is taking has lowered her LDL to a more therapeutic level. If we need to increase it at the next visit we certainly can.  -Would recommend that she continues to follow with her Coumadin clinic in regards to the medication that she has to take on a daily basis. Should check with them to make sure if there are ever any medication adjustments that need to be made based on her PT/INR.   She should also make sure that they are always coming to the house to check her PT and INR every week and also making sure that she has enough warfarin for the week as well.  -Encouraged the patient to try to be a little bit more active, her vision does impair her to an extent with being able to go outside and walk. However, I stated that if she could try to slowly work up her walking per day. She could try to walk about like 10 to 15 minutes outside for a week or 2 and then increase that to 20 or 30 minutes over time. It would be good for her to get out of the house and she is inside for the most part the majority of the day. -Encouraged her to have a more healthy well-balanced diet, and encouraged her to try to follow the Mediterranean diet if possible. If she needs any advice she can always look it up on her phone.      - For ongoing stroke prevention continue: Warfarin 5 mg once daily at this time, atorvastatin 40 mg once daily  - Discussed the importance of antiplatelet management with the patient to prevent future strokes. - Recommend to check blood pressure occasionally away from the doctor's office to make sure that those numbers are typically less than 130/80. If they are frequently higher than that, we recommend checking a little more often and to follow up with primary care team   - Will defer to primary care team for monitoring of cholesterol panel and blood sugar numbers with target LDL cholesterol of less than 70 and hemoglobin A1c less than 7%  - Recommend following a low salt, mediterranean diet   - Recommend routine physical exercise as tolerated     We will plan for her to return to the office in 4 to 5-months time to see on of the APPs or Dr. Mickey Mancera but would be happy to see her sooner if the need should arise.   If she has any symptoms concerning for TIA or stroke including sudden painless loss of vision or double vision, difficulty speaking or swallowing, vertigo/room spinning that does not quickly resolve, or weakness/numbness/loss of coordination affecting 1 side of the face or body she should proceed by ambulance to the nearest emergency room immediately. Subjective:    HPI      For Review/Hospital Course:    "80year old female with remote history of migraines, presenting on 3/5 (and again yesterday), after experiencing few days ofa typical headache and vision disturbances (patient non-specific in describing vision changes, but suggests visual field distortion on her R side).      Neuroimaging reveals presence of L occipital/PCA territory infarct with L P2/PCA occlusion from vascular standpoint. Likely cardio-embolic in the setting of new onset a-fib/a-flutter.     Neuro exam with R homonymous hemianopsia, otherwise intact." - Laron Harkins PA-C, 03/07/2023    Patient CT head on 3/5 showed left occipital hypoattenuation. CTA head/neck with left P2/PCA occlusion. MRI brain with and without contrast showed moderate/large left PCA territory infarct. Patient was placed on aspirin initially while in the hospital, recommended initiating oral anticoagulation 14 days poststroke. Patient was going to be price check for Eliquis full dose at that time. When the patient was to initiate anticoagulation she could stop taking aspirin 81 mg. Due to right homonymous hemianopsia, patient was deemed unsafe to drive and [employer] had ended up provoking the patient's license. Patient will need ophthalmology evaluation/fitness to drive testing to be deemed safe to drive in the future. Upon reviewing medication before the appointment, appears that the patient was not able to receive Eliquis or any other DOACs at this time. Recommended that she start on warfarin for anticoagulation, she is currently on warfarin and is following with PT/INR draws on what appears to be a weekly basis. Interval History:      New stroke symptoms/residual symptoms:    Any new, sudden onset weakness, numbness, facial droop, slurred speech, difficulty speaking, trouble swallowing, persistent vertigo, or sudden double vision or vision loss?  No new stroke like symptoms    Residual symptoms include: vision loss, right homonymous hemianopsia     Stroke Etiology and Risk Factor modification: This was a(n) embolic stroke, most likely related to Cardioembolic: Non-Valvular A-Fib    Stroke risk factors were evaluated including: atrial fibrillation, hyperlipidemia    AP/AC therapy: Warfarin 5 mg once daily. Managing her INR and getting blood work once weekly. No significant bleeding or brusing noted at this time. Statin therapy: Lipitor 40 mg once daily     Blood pressure today and as of late: 112/64, usually around the same at home when she checks this. Most recent LDL: 158 mg/dL    Most recent hemoglobin A1C: 5.6%    Cardiology evaluation? Any cardiac monitoring required?:  Patient did have a Zio patch after leaving the hospital, it was noted that the patient had atrial fibrillation continuously (100% burden), currently on warfarin therapy. Endocrinology evaluation? Following proper glycemic treatment/diet? No endocrinology    Lifestyle history/modifications:    Diet/Exercise regimen: Patient states that she could be eating healthier, sometimes she is eating what ever is around the house if her daughter had not brought her food yet for the day. Some meals are on healthier than others she states. Has not really been exercising or walking at this time, especially due to the fact that her vision is impaired. Any physical therapy, occupational therapy or speech therapy performed/required at this time?:  No PT/OT at this time    Any difficulty with sleep? No significant issues with sleep    Any history of sleep apnea apnea? CPAP compliance?:  No snoring    Post-stroke depression/anxiety? Does have some depression/anxiety currently having a hard time adjusting to not being able to do what she used to do before the stroke occurred.   States is very hard for her to be able to not drive at this time and she was a previous [employer]  and states that it is hard for her not being able to get back to that at this time. Any history of smoking? Smoking before the stroke, started very young at around 17years old.       Lab Results   Component Value Date/Time    CHOLESTEROL 227 (H) 03/06/2023 10:32 PM    CHOLESTEROL 268 (H) 04/26/2019 09:03 AM     Lab Results   Component Value Date/Time    TRIG 73 03/06/2023 10:32 PM    TRIG 85 04/26/2019 09:03 AM     Lab Results   Component Value Date/Time    HDL 54 03/06/2023 10:32 PM    HDL 65 04/26/2019 09:03 AM     Lab Results   Component Value Date/Time    LDLCALC 158 (H) 03/06/2023 10:32 PM    LDLCALC 184 (H) 04/26/2019 09:03 AM       Lab Results   Component Value Date/Time    HGBA1C 5.6 03/06/2023 06:30 PM     Lab Results   Component Value Date/Time     03/06/2023 06:30 PM           Past Medical History:   Diagnosis Date   • Migraine    • Premature supraventricular beats        Current Outpatient Medications:   •  atorvastatin (LIPITOR) 40 mg tablet, take 1 tablet by mouth once daily with dinner, Disp: 30 tablet, Rfl: 5  •  digoxin (LANOXIN) 0.125 mg tablet, take 1 tablet by mouth once daily, Disp: 30 tablet, Rfl: 5  •  diltiazem (CARDIZEM) 120 MG tablet, take 1 tablet by mouth every 12 hours, Disp: 60 tablet, Rfl: 5  •  escitalopram (LEXAPRO) 5 mg tablet, Take 1 tablet (5 mg total) by mouth daily, Disp: 30 tablet, Rfl: 5  •  metoprolol succinate (TOPROL-XL) 25 mg 24 hr tablet, take 1 tablet by mouth every 12 hours, Disp: 60 tablet, Rfl: 5  •  warfarin (Coumadin) 5 mg tablet, Take 1 tablet (5 mg total) by mouth daily, Disp: 30 tablet, Rfl: 5     Objective:    Physical Exam:                                                                 Vitals:            Constitutional:    /64 (BP Location: Right arm, Patient Position: Sitting, Cuff Size: Adult)   Pulse 67   Temp 98.2 °F (36.8 °C) (Temporal)   Ht 5' 4" (1.626 m)   Wt 65.9 kg (145 lb 3.2 oz)   SpO2 98%   BMI 24.92 kg/m²   BP Readings from Last 3 Encounters:   08/10/23 112/64 07/11/23 110/59   06/06/23 100/60     Pulse Readings from Last 3 Encounters:   08/10/23 67   07/11/23 (!) 49   06/06/23 60         Well developed, well nourished, well groomed. No dysmorphic features. Psychiatric:  Normal behavior and appropriate affect        Neurological Examination:     Mental status/cognitive function:   Orientated to time, place and person. Recent and remote memory intact. Attention span and concentration as well as fund of knowledge are appropriate for age. Normal language and spontaneous speech. Cranial Nerves:  II-visual fields full except for right visual field cut. Right homonymous hemianopsia  III, IV, VI-Pupils were equal, round, and reactive to light and accomodation. Extraocular movements were full and conjugate without nystagmus. Conjugate gaze, normal smooth pursuits, normal saccades   V-facial sensation symmetric. VII-facial expression symmetric, intact forehead wrinkle, strong eye closure, symmetric smile    VIII-hearing grossly intact bilaterally   IX, X-palate elevation symmetric, no dysarthria. XI-shoulder shrug strength intact    XII-tongue protrusion midline. Motor Exam: symmetric bulk and tone throughout, no pronator drift. Power/strength 5/5 bilateral upper and lower extremities, no atrophy, fasciculations or abnormal movements noted. Sensory: grossly intact light touch in all extremities. Reflexes: brachioradialis 2+, biceps 2+, knee 2+, bilaterally  Coordination: Finger nose finger intact bilaterally, no apparent dysmetria, ataxia or tremor noted  Gait: steady casual and tandem gait. ROS:    Review of Systems   Constitutional: Negative for appetite change, fatigue and fever. HENT: Negative. Negative for hearing loss, tinnitus, trouble swallowing and voice change. Eyes: Positive for photophobia. Negative for pain and visual disturbance. Respiratory: Negative. Negative for shortness of breath. Cardiovascular: Negative.   Negative for palpitations. Gastrointestinal: Negative. Negative for nausea and vomiting. Endocrine: Negative. Negative for cold intolerance. Genitourinary: Negative. Negative for dysuria, frequency and urgency. Musculoskeletal: Negative for back pain, gait problem, myalgias and neck pain. Skin: Negative. Negative for rash. Allergic/Immunologic: Negative. Neurological: Negative. Negative for dizziness, tremors, seizures, syncope, facial asymmetry, speech difficulty, weakness, light-headedness, numbness and headaches. Hematological: Negative. Does not bruise/bleed easily. Psychiatric/Behavioral: Negative. Negative for confusion, hallucinations and sleep disturbance.          I have spent 45 minutes today on this case including chart review, performing history and exam, patient counseling, and documentation/communication      Shayne Hudson PA-C  8/10/2023 10:30 AM

## 2023-08-10 NOTE — PATIENT INSTRUCTIONS
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 She is presenting today for a Hospital follow-up in regards to her most recent stroke that affected the left occipital lobe. In regards to residual deficits, she does have some right homonymous hemianopsia from the recent infarct that she had. She does have a significant visual field cut out of her right visual field. No new strokelike symptoms or any other residual symptoms of stroke at this time. Patient has still not been driving and she has had her license revoked by [employer] at this time. She is not ready to drive at this point in time, however have explained to her the steps of potentially getting her license back. If she felt that she would be willing to drive in the future, we would need her to have an ophthalmology evaluation and a fitness to drive before we could even consider her getting her [place] 's license back. -Before next visit I would recommend is that Mis Duran has a lipid panel. She can have his lipid panel done at home when they come to get her PT/INR drawls for her Coumadin. We can take a look at this and make sure that the atorvastatin that she is taking has lowered her LDL to a more therapeutic level. If we need to increase it at the next visit we certainly can.  -Would recommend that she continues to follow with her Coumadin clinic in regards to the medication that she has to take on a daily basis. Should check with them to make sure if there are ever any medication adjustments that need to be made based on her PT/INR.   She should also make sure that they are always coming to the house to check her PT and INR every week and also making sure that she has enough warfarin for the week as well.  -Encouraged the patient to try to be a little bit more active, her vision does impair her to an extent with being able to go outside and walk. However, I stated that if she could try to slowly work up her walking per day. She could try to walk about like 10 to 15 minutes outside for a week or 2 and then increase that to 20 or 30 minutes over time. It would be good for her to get out of the house and she is inside for the most part the majority of the day. -Encouraged her to have a more healthy well-balanced diet, and encouraged her to try to follow the Mediterranean diet if possible. If she needs any advice she can always look it up on her phone.      - For ongoing stroke prevention continue: Warfarin 5 mg once daily at this time, atorvastatin 40 mg once daily  - Discussed the importance of antiplatelet management with the patient to prevent future strokes. - Recommend to check blood pressure occasionally away from the doctor's office to make sure that those numbers are typically less than 130/80. If they are frequently higher than that, we recommend checking a little more often and to follow up with primary care team   - Will defer to primary care team for monitoring of cholesterol panel and blood sugar numbers with target LDL cholesterol of less than 70 and hemoglobin A1c less than 7%  - Recommend following a low salt, mediterranean diet   - Recommend routine physical exercise as tolerated     We will plan for her to return to the office in 4 to 5-months time to see on of the APPs or Dr. Phoebe Hester but would be happy to see her sooner if the need should arise. If she has any symptoms concerning for TIA or stroke including sudden painless loss of vision or double vision, difficulty speaking or swallowing, vertigo/room spinning that does not quickly resolve, or weakness/numbness/loss of coordination affecting 1 side of the face or body she should proceed by ambulance to the nearest emergency room immediately.

## 2023-08-10 NOTE — PROGRESS NOTES
Review of Systems   Constitutional: Negative for appetite change, fatigue and fever. HENT: Negative. Negative for hearing loss, tinnitus, trouble swallowing and voice change. Eyes: Positive for photophobia. Negative for pain and visual disturbance. Respiratory: Negative. Negative for shortness of breath. Cardiovascular: Negative. Negative for palpitations. Gastrointestinal: Negative. Negative for nausea and vomiting. Endocrine: Negative. Negative for cold intolerance. Genitourinary: Negative. Negative for dysuria, frequency and urgency. Musculoskeletal: Negative for back pain, gait problem, myalgias and neck pain. Skin: Negative. Negative for rash. Allergic/Immunologic: Negative. Neurological: Negative. Negative for dizziness, tremors, seizures, syncope, facial asymmetry, speech difficulty, weakness, light-headedness, numbness and headaches. Hematological: Negative. Does not bruise/bleed easily. Psychiatric/Behavioral: Negative. Negative for confusion, hallucinations and sleep disturbance.     White dot/aura in the left eye every once in a while

## 2023-08-15 ENCOUNTER — ANTICOAG VISIT (OUTPATIENT)
Dept: CARDIOLOGY CLINIC | Facility: CLINIC | Age: 68
End: 2023-08-15

## 2023-08-15 ENCOUNTER — APPOINTMENT (OUTPATIENT)
Dept: LAB | Facility: HOSPITAL | Age: 68
End: 2023-08-15
Attending: INTERNAL MEDICINE
Payer: MEDICARE

## 2023-08-15 DIAGNOSIS — I63.40 CEREBROVASCULAR ACCIDENT (CVA) DUE TO EMBOLISM OF CEREBRAL ARTERY (HCC): ICD-10-CM

## 2023-08-15 DIAGNOSIS — E78.00 HYPERCHOLESTEROLEMIA: ICD-10-CM

## 2023-08-15 LAB
CHOLEST SERPL-MCNC: 139 MG/DL
HDLC SERPL-MCNC: 57 MG/DL
LDLC SERPL CALC-MCNC: 74 MG/DL (ref 0–100)
TRIGL SERPL-MCNC: 39 MG/DL

## 2023-08-15 PROCEDURE — 80061 LIPID PANEL: CPT

## 2023-08-15 NOTE — PROGRESS NOTES
Left message for [place], advised INR good, continue 2.5 mg Mon Fri, 5 mg all other days, will recheck next week 8/22/23

## 2023-08-17 ENCOUNTER — PATIENT OUTREACH (OUTPATIENT)
Dept: FAMILY MEDICINE CLINIC | Facility: CLINIC | Age: 68
End: 2023-08-17

## 2023-08-17 NOTE — PROGRESS NOTES
[facility] completed a chart review prior to calling patient. [facility] called patient to follow up on [facility] application. Per patient her daughter Patty Poag help her obtain all required documents to complete application. [facility] application was mailed to [facility] this past Friday 8/11. [facility] will continue to follow up and assist patient as needed.      Anticipated next contact date: 8/31/23

## 2023-08-22 ENCOUNTER — APPOINTMENT (OUTPATIENT)
Dept: LAB | Facility: HOSPITAL | Age: 68
End: 2023-08-22
Attending: INTERNAL MEDICINE
Payer: MEDICARE

## 2023-08-22 ENCOUNTER — ANTICOAG VISIT (OUTPATIENT)
Dept: CARDIOLOGY CLINIC | Facility: CLINIC | Age: 68
End: 2023-08-22

## 2023-08-22 NOTE — PROGRESS NOTES
Left message for Bradley Degroot, advised INR good, continue 2.5 mg Mon Fri, 5 mg all other days, will recheck next week 8/29/23  Advised to call with questions or concerns.

## 2023-08-29 ENCOUNTER — ANTICOAG VISIT (OUTPATIENT)
Dept: CARDIOLOGY CLINIC | Facility: CLINIC | Age: 68
End: 2023-08-29

## 2023-08-29 ENCOUNTER — APPOINTMENT (OUTPATIENT)
Dept: LAB | Facility: HOSPITAL | Age: 68
End: 2023-08-29
Attending: INTERNAL MEDICINE
Payer: MEDICARE

## 2023-08-29 NOTE — PROGRESS NOTES
Left message for Chao Holman, advised INR good, continue 2.5 mg Mon Fri, 5 mg all other days, will recheck next week 9/5/23

## 2023-08-31 ENCOUNTER — PATIENT OUTREACH (OUTPATIENT)
Dept: FAMILY MEDICINE CLINIC | Facility: CLINIC | Age: 68
End: 2023-08-31

## 2023-08-31 NOTE — PROGRESS NOTES
[address] [place] [address] completed a chart review prior to contacting. [address] [place] [address] called patient to follow up on [facility] application. Unable to reach patient, left a message requesting a call back. If no return call is received, [address] [place] [address] will make a second attempt to reach patient in about 1 week.      Anticipated next contact date: 9/7/23

## 2023-09-01 ENCOUNTER — PATIENT OUTREACH (OUTPATIENT)
Dept: FAMILY MEDICINE CLINIC | Facility: CLINIC | Age: 68
End: 2023-09-01

## 2023-09-05 ENCOUNTER — APPOINTMENT (OUTPATIENT)
Dept: LAB | Facility: HOSPITAL | Age: 68
End: 2023-09-05
Attending: INTERNAL MEDICINE
Payer: MEDICARE

## 2023-09-05 ENCOUNTER — ANTICOAG VISIT (OUTPATIENT)
Dept: CARDIOLOGY CLINIC | Facility: CLINIC | Age: 68
End: 2023-09-05

## 2023-09-05 NOTE — PROGRESS NOTES
[facility] completed a chart review prior to  contacting  patient. [facility] called patient to follow up on [facility] application. Per patient she has not received any updates on her application. [facility] placed a call out to the [facility] fianacial counselor. According to the [facility] counselor  they never received patients application. [facility] requested a new application to be sent to patient home. Call patient and informed, states she did put her completed application on the to go mail on her mailbox. Patient will complete new application and take it to the post office this time. [facility] will continue to follow up and assist patient as needed.      Anticipated next contact date: 9/12

## 2023-09-12 ENCOUNTER — PATIENT OUTREACH (OUTPATIENT)
Dept: FAMILY MEDICINE CLINIC | Facility: CLINIC | Age: 68
End: 2023-09-12

## 2023-09-12 ENCOUNTER — APPOINTMENT (OUTPATIENT)
Dept: LAB | Facility: HOSPITAL | Age: 68
End: 2023-09-12
Attending: INTERNAL MEDICINE
Payer: MEDICARE

## 2023-09-12 ENCOUNTER — ANTICOAG VISIT (OUTPATIENT)
Dept: CARDIOLOGY CLINIC | Facility: CLINIC | Age: 68
End: 2023-09-12

## 2023-09-12 NOTE — PROGRESS NOTES
Left message for Janelle Eldridge, advised INR good, continue 2.5 mg Mon Fri, 5 mg all other days, will recheck next week 9/19/23  Advised to call with questions or concerns.

## 2023-09-13 NOTE — PROGRESS NOTES
[address] [place] [address] completed a chart review prior to calling patient. [facility] called follow up on [facility] application. Unable to reach patient, left a message requesting a call back. If no return call is received [employer] will attempt to contact patient in about 1 week.

## 2023-09-19 ENCOUNTER — APPOINTMENT (OUTPATIENT)
Dept: LAB | Facility: HOSPITAL | Age: 68
End: 2023-09-19
Attending: INTERNAL MEDICINE
Payer: MEDICARE

## 2023-09-19 ENCOUNTER — ANTICOAG VISIT (OUTPATIENT)
Dept: CARDIOLOGY CLINIC | Facility: CLINIC | Age: 68
End: 2023-09-19

## 2023-09-19 NOTE — PROGRESS NOTES
Left message for Ole Pompa, advised INR good, continue 2.5 mg Mon Fri, 5 mg all other days, will recheck next week 9/26/23

## 2023-09-20 ENCOUNTER — PATIENT OUTREACH (OUTPATIENT)
Dept: FAMILY MEDICINE CLINIC | Facility: CLINIC | Age: 68
End: 2023-09-20

## 2023-09-21 NOTE — PROGRESS NOTES
[address] [place] [address] completed a chart review prior to calling patient.     [facility] called follow up on [facility] application.     Unable to reach patient, left a message requesting a call back. If no return call is received [employer] will attempt to contact patient in about 2 week.       Anticipated next contact date: 10/05/23

## 2023-09-22 ENCOUNTER — OFFICE VISIT (OUTPATIENT)
Dept: CARDIOLOGY CLINIC | Facility: CLINIC | Age: 68
End: 2023-09-22
Payer: MEDICARE

## 2023-09-22 VITALS
WEIGHT: 145.8 LBS | HEART RATE: 57 BPM | BODY MASS INDEX: 25.03 KG/M2 | DIASTOLIC BLOOD PRESSURE: 72 MMHG | SYSTOLIC BLOOD PRESSURE: 115 MMHG

## 2023-09-22 DIAGNOSIS — I48.19 PERSISTENT ATRIAL FIBRILLATION (HCC): ICD-10-CM

## 2023-09-22 DIAGNOSIS — E78.00 HYPERCHOLESTEROLEMIA: ICD-10-CM

## 2023-09-22 DIAGNOSIS — I49.3 PVC'S (PREMATURE VENTRICULAR CONTRACTIONS): ICD-10-CM

## 2023-09-22 DIAGNOSIS — R25.1 TREMOR OF BOTH HANDS: ICD-10-CM

## 2023-09-22 DIAGNOSIS — Z87.891 HISTORY OF TOBACCO ABUSE: ICD-10-CM

## 2023-09-22 DIAGNOSIS — R94.31 LONG QT INTERVAL: ICD-10-CM

## 2023-09-22 DIAGNOSIS — I63.40 CEREBROVASCULAR ACCIDENT (CVA) DUE TO EMBOLISM OF CEREBRAL ARTERY (HCC): Primary | ICD-10-CM

## 2023-09-22 PROCEDURE — 93000 ELECTROCARDIOGRAM COMPLETE: CPT | Performed by: INTERNAL MEDICINE

## 2023-09-22 PROCEDURE — 99214 OFFICE O/P EST MOD 30 MIN: CPT | Performed by: INTERNAL MEDICINE

## 2023-09-22 NOTE — PROGRESS NOTES
[facility]  [address] [facility] [address] [place] [address]  Phone#  [phone]   Fax#  [phone]  *-*-*-*-*-*-*-*-*-*-*-*-*-*-*-*-*-*-*-*-*-*-*-*-*-*-*-*-*-*-*-*-*-*-*-*-*-*-*-*-*-*-*-*-*-*-*-*-*-*-*-*-*-*                                   Cardiology Follow Up      ENCOUNTER DATE: 23 12:32 PM  PATIENT NAME: Ara Castro   : 1955    MRN: [id number]  AGE:68 y.o. SEX: female  [address], [place]     PRIMARY CARE PHYSICIAN: Geremias Mckeon PA-C    ACTIVE DIAGNOSIS THIS VISIT  1. Cerebrovascular accident (CVA) due to embolism of cerebral artery (HCC)        2. Persistent atrial fibrillation (HCC)  POCT ECG      3. PVC's (premature ventricular contractions)        4. Hypercholesterolemia        5. History of tobacco abuse with recent cessation        6. Long QT interval        7. Tremor of both hands          ACTIVE PROBLEM LIST  Patient Active Problem List   Diagnosis   • Allergic rhinitis   • Depression, recurrent ([address])   • Hypercholesterolemia   • History of tobacco abuse with recent cessation   • Long QT interval   • Tremor of both hands   • Head movements abnormal   • Acute upper respiratory infection   • Persistent atrial fibrillation (HCC)   • Cerebrovascular accident (CVA) due to embolism of cerebral artery (HCC)   • Migraine with aura and without status migrainosus, not intractable   • Abnormal magnetic resonance imaging of kidney   • Cerebral edema (HCC)   • PVC's (premature ventricular contractions)       CARDIOLOGY SPECIALTY COMMENTS  3/6-3/11/2023 [facility]: 45-year-old female presents for follow-up post recent hospitalization. Patient presenting with a headache. She was found to have embolic strokes and newly diagnosed atrial fibrillation. Patient without insurance coverage for NOAC so she was initiated on Coumadin and follows with the Coumadin clinic.    2023 echocardiogram: Normal left ventricular systolic function, EF 36%.   RVE, CIRO AND LAE. Mild TR.    4/28/2023 ambulatory extended Holter monitor:  - Atrial Fibrillation occurred continuously (100% burden), ranging from 39-  143 bpm (avg of 69 bpm). - Isolated VEs were rare (<1.0%, 5353), VE Couplets were rare (<1.0%, 195), and VE Triplets were rare (<1.0%, 7). Ventricular Bigeminy and Trigeminy were present. INTERVAL HISTORY:        Patient with chronic atrial fibrillation returns. Her EKG demonstrated atrial fibrillation with a slow ventricular response of 57 bpm.  In April she had an ambulatory Holter monitor which demonstrated a range of heart rates from 39 to 143 bpm with an average of 69 bpm.  She had rare PVCs and couplets. She denies cardiac symptoms. Patient denies chest discomfort or shortness of breath. Patient has no palpitations. Patient denies symptoms of dizziness, lightheadedness or near-syncope/syncope. Patient denies leg edema. Patient denies symptoms of orthopnea or paroxysmal nocturnal dyspnea. Her cholesterol is much improved with a total cholesterol 139, triglycerides 39 and LDL 74. Blood pressure is 115/72.     DISCUSSION/PLAN:          · Recommend discontinuing metoprolol  · Continue digoxin 125 mcg every other day and Cardizem 120 mg every 12 hours  · Return in 6 months  · EKG on return    Lab Studies:    Lab Results   Component Value Date    CHOLESTEROL 139 08/15/2023    CHOLESTEROL 227 (H) 03/06/2023    CHOLESTEROL 268 (H) 04/26/2019     Lab Results   Component Value Date    TRIG 39 08/15/2023    TRIG 73 03/06/2023    TRIG 85 04/26/2019     Lab Results   Component Value Date    HDL 57 08/15/2023    HDL 54 03/06/2023    HDL 65 04/26/2019     Lab Results   Component Value Date    LDLCALC 74 08/15/2023    LDLCALC 158 (H) 03/06/2023    LDLCALC 184 (H) 04/26/2019     Lab Results   Component Value Date    NONHDL 203 (H) 04/26/2019       Lab Results   Component Value Date    HGBA1C 5.6 03/06/2023      Lab Results   Component Value Date    EGFR 96 03/11/2023 EGFR 97 03/07/2023    EGFR 93 03/06/2023    SODIUM 139 03/11/2023    SODIUM 138 03/07/2023    SODIUM 137 03/06/2023    K 4.0 03/11/2023    K 3.8 03/07/2023    K 3.8 03/06/2023     03/11/2023     03/07/2023     03/06/2023    CO2 26 03/11/2023    CO2 23 03/07/2023    CO2 22 03/06/2023    BUN 12 03/11/2023    BUN 10 03/07/2023    BUN 12 03/06/2023    CREATININE 0.56 (L) 03/11/2023    CREATININE 0.54 (L) 03/07/2023    CREATININE 0.62 03/06/2023     Lab Results   Component Value Date    WBC 9.65 03/11/2023    WBC 11.05 (H) 03/07/2023    WBC 11.26 (H) 03/06/2023    HGB 13.2 03/11/2023    HGB 12.8 03/07/2023    HGB 13.5 03/06/2023    HCT 40.4 03/11/2023    HCT 38.4 03/07/2023    HCT 40.6 03/06/2023    MCV 88 03/11/2023    MCV 88 03/07/2023    MCV 86 03/06/2023    MCH 28.9 03/11/2023    MCH 29.3 03/07/2023    MCH 28.6 03/06/2023    MCHC 32.7 03/11/2023    MCHC 33.3 03/07/2023    MCHC 33.3 03/06/2023     03/11/2023     03/07/2023     03/06/2023      Lab Results   Component Value Date    CALCIUM 9.1 03/11/2023    CALCIUM 8.7 03/07/2023    CALCIUM 9.3 03/06/2023    AST 21 03/06/2023    AST 21 04/26/2019    ALT 12 03/06/2023    ALT 15 04/26/2019    ALKPHOS 72 03/06/2023    ALKPHOS 64 04/26/2019    MG 2.0 03/06/2023       Lab Results   Component Value Date    DIGOXIN 1.5 04/11/2023    DIGOXIN 0.9 03/11/2023       Results for orders placed or performed in visit on 09/22/23   POCT ECG    Narrative    Atrial fibrillation with a slow ventricular response of 57 bpm.  Abnormal EKG.          Current Outpatient Medications:   •  atorvastatin (LIPITOR) 40 mg tablet, take 1 tablet by mouth once daily with dinner, Disp: 30 tablet, Rfl: 5  •  digoxin (LANOXIN) 0.125 mg tablet, take 1 tablet by mouth once daily (Patient taking differently: Take 125 mcg by mouth every other day), Disp: 30 tablet, Rfl: 5  •  diltiazem (CARDIZEM) 120 MG tablet, take 1 tablet by mouth every 12 hours, Disp: 60 tablet, Rfl: 5  •  escitalopram (LEXAPRO) 5 mg tablet, Take 1 tablet (5 mg total) by mouth daily, Disp: 30 tablet, Rfl: 5  •  warfarin (Coumadin) 5 mg tablet, Take 1 tablet (5 mg total) by mouth daily, Disp: 30 tablet, Rfl: 5  No Known Allergies    Past Medical History:   Diagnosis Date   • Migraine    • Premature supraventricular beats      Social History     Socioeconomic History   • Marital status:      Spouse name: Not on file   • Number of children: 1   • Years of education: Not on file   • Highest education level: Not on file   Occupational History   • Occupation: EMPLOYED   Tobacco Use   • Smoking status: Former     Packs/day: 1.00     Years: 40.00     Total pack years: 40.00     Types: Cigarettes     Quit date: 3/1/2023     Years since quittin.5   • Smokeless tobacco: Never   Vaping Use   • Vaping Use: Every day   Substance and Sexual Activity   • Alcohol use: No     Comment: (HISTORY)   • Drug use: No   • Sexual activity: Not Currently   Other Topics Concern   • Not on file   Social History Narrative     AS PER [facility]     Social Determinants of Health     Financial Resource Strain: Medium Risk (2023)    Overall Financial Resource Strain (CARDIA)    • Difficulty of Paying Living Expenses: Somewhat hard   Food Insecurity: No Food Insecurity (3/7/2023)    Hunger Vital Sign    • Worried About Running Out of Food in the Last Year: Never true    • Ran Out of Food in the Last Year: Never true   Transportation Needs: Unmet Transportation Needs (2023)    PRAPARE - Transportation    • Lack of Transportation (Medical):  Yes    • Lack of Transportation (Non-Medical): Yes   Physical Activity: Not on file   Stress: Not on file   Social Connections: Not on file   Intimate Partner Violence: Not on file   Housing Stability: Low Risk  (3/7/2023)    Housing Stability Vital Sign    • Unable to Pay for Housing in the Last Year: No    • Number of Places Lived in the Last Year: 1    • Unstable Housing in the Last Year: No      Family History   Problem Relation Age of Onset   • Colon cancer Father    • Colon cancer Brother      No past surgical history on file. PREVIOUS WEIGHTS:   Wt Readings from Last 10 Encounters:   09/22/23 66.1 kg (145 lb 12.8 oz)   08/10/23 65.9 kg (145 lb 3.2 oz)   07/11/23 65.5 kg (144 lb 6.4 oz)   06/06/23 66.2 kg (146 lb)   04/04/23 67.6 kg (149 lb)   03/17/23 70.3 kg (155 lb)   03/11/23 69.7 kg (153 lb 9.6 oz)   03/07/23 78.4 kg (172 lb 13.5 oz)   03/05/23 70.7 kg (155 lb 13.8 oz)   11/11/19 69.9 kg (154 lb 3.2 oz)        Review of Systems:  Review of Systems   Respiratory: Negative for cough, choking, chest tightness, shortness of breath and wheezing. Cardiovascular: Negative for chest pain, palpitations and leg swelling. Musculoskeletal: Negative for gait problem. Skin: Negative for rash. Neurological: Negative for dizziness, tremors, syncope, weakness, light-headedness, numbness and headaches. Psychiatric/Behavioral: Negative for agitation and behavioral problems. The patient is not hyperactive. Physical Exam:  /72 (BP Location: Left arm, Patient Position: Sitting, Cuff Size: Large)   Pulse 57   Wt 66.1 kg (145 lb 12.8 oz)   BMI 25.03 kg/m²     Physical Exam  Constitutional:       General: She is not in acute distress. Appearance: She is well-developed. HENT:      Head: Normocephalic and atraumatic. Neck:      Thyroid: No thyromegaly. Vascular: No carotid bruit or JVD. Trachea: No tracheal deviation. Cardiovascular:      Rate and Rhythm: Normal rate. Rhythm irregularly irregular. Pulses: Normal pulses. Heart sounds: Normal heart sounds. No murmur heard. No friction rub. No gallop. Pulmonary:      Effort: Pulmonary effort is normal. No respiratory distress. Breath sounds: Normal breath sounds. No wheezing, rhonchi or rales. Chest:      Chest wall: No tenderness. Musculoskeletal:         General: Normal range of motion. Cervical back: Normal range of motion and neck supple. Right lower leg: No edema. Left lower leg: No edema. Skin:     General: Skin is warm and dry. Neurological:      General: No focal deficit present. Mental Status: She is alert and oriented to person, place, and time. Psychiatric:         Mood and Affect: Mood normal.         Behavior: Behavior normal.         Thought Content: Thought content normal.         Judgment: Judgment normal.       ======================================================  Imaging:   I have personally reviewed pertinent reports. I spent 30 minutes on the patient's office visit. This time was spent on the day of the visit. I had direct contact with the patient in the office on the day of the visit. Greater than 50% of the total time was spent obtaining a history, examining patient, answering all patient questions, arranging and discussing plan of care with patient as well as directly providing instructions. Additional time then spent on orders and office chart. Portions of the record may have been created with voice recognition software. Occasional wrong word or "sound a like" substitutions may have occurred due to the inherent limitations of voice recognition software. Read the chart carefully and recognize, using context, where substitutions have occurred.     SIGNATURES:   Mc Mitchell MD

## 2023-09-25 ENCOUNTER — TELEPHONE (OUTPATIENT)
Dept: LAB | Facility: HOSPITAL | Age: 68
End: 2023-09-25

## 2023-09-26 ENCOUNTER — ANTICOAG VISIT (OUTPATIENT)
Dept: CARDIOLOGY CLINIC | Facility: CLINIC | Age: 68
End: 2023-09-26

## 2023-09-26 ENCOUNTER — APPOINTMENT (OUTPATIENT)
Dept: LAB | Facility: HOSPITAL | Age: 68
End: 2023-09-26
Attending: INTERNAL MEDICINE
Payer: MEDICARE

## 2023-09-26 NOTE — PROGRESS NOTES
Called daughter Jose Gmili Huynh same dose recheck one week as per neurology or 2 weeks is okay from our standpoint.

## 2023-10-05 ENCOUNTER — PATIENT OUTREACH (OUTPATIENT)
Dept: FAMILY MEDICINE CLINIC | Facility: CLINIC | Age: 68
End: 2023-10-05

## 2023-10-05 NOTE — PROGRESS NOTES
[facility] completed a chart review prior to contacting patient. Called patient to follow up on [facility] application    Per patient she completed a new application and call the hospital financial counselors. She was told she needed to submit a copy of her income tax return and a copy of her bank statement. Pt was requested to e-mail this documents to the financial counselors however pt not sure if she wrote down the e-mail correctly. Pt daughter Clemente Silva is assisting her with the e-mail situation, pt not familiar with technology. E-mail sent to pt daughter advising to sent documents to [facility] Pt will have daughter Clemente Silva sent documents to [facility] via e-mail. No other needs concerns identify at this time. [facility] Will continue to follow up and assist patient as needed with [facility] application.       Anticipated next contact date: 10/12/23

## 2023-10-10 ENCOUNTER — ANTICOAG VISIT (OUTPATIENT)
Dept: CARDIOLOGY CLINIC | Facility: CLINIC | Age: 68
End: 2023-10-10

## 2023-10-10 ENCOUNTER — APPOINTMENT (OUTPATIENT)
Dept: LAB | Facility: HOSPITAL | Age: 68
End: 2023-10-10
Attending: INTERNAL MEDICINE
Payer: MEDICARE

## 2023-10-12 ENCOUNTER — PATIENT OUTREACH (OUTPATIENT)
Dept: FAMILY MEDICINE CLINIC | Facility: CLINIC | Age: 68
End: 2023-10-12

## 2023-10-12 NOTE — PROGRESS NOTES
Incoming email received from patient daughter Curtis Harris with patients Bank statements. Pt still missing tax return documents. Spoke to patient, inform documents received however she is still missing her tax return. per patient she will have her daughter Curtis Harris email tax return. [facility] will continue to follow up and assist patient  as needed with [facility] care application.      Anticipated next contact date: 10/19/23

## 2023-10-19 ENCOUNTER — PATIENT OUTREACH (OUTPATIENT)
Dept: FAMILY MEDICINE CLINIC | Facility: CLINIC | Age: 68
End: 2023-10-19

## 2023-10-19 NOTE — PROGRESS NOTES
[facility] that she continues to work with pt to get héctor care. Called to pt and she denies further needs. Pts dtr is assisting her with documents. OP KEI to remain available.

## 2023-10-19 NOTE — PROGRESS NOTES
Incoming email received from patient daughter Danette Urena with pt patient tax return documents. Incoming call also received from patient daughter Danette Urena to confirmed [facility] received tax return information. All forms received from patient daughter email to  the [facility] counselors email. Confirmation email received from [address] [place] [address] stating pt documents were received and scan into pt application. [facility] will continue to follow up and assist patient as needed.     Anticipated next contact date:  11/2/23

## 2023-10-24 ENCOUNTER — APPOINTMENT (OUTPATIENT)
Dept: LAB | Facility: HOSPITAL | Age: 68
End: 2023-10-24
Attending: INTERNAL MEDICINE
Payer: MEDICARE

## 2023-10-24 ENCOUNTER — ANTICOAG VISIT (OUTPATIENT)
Dept: CARDIOLOGY CLINIC | Facility: CLINIC | Age: 68
End: 2023-10-24

## 2023-10-31 DIAGNOSIS — I48.91 NEW ONSET A-FIB (HCC): ICD-10-CM

## 2023-10-31 DIAGNOSIS — I63.40 CEREBROVASCULAR ACCIDENT (CVA) DUE TO EMBOLISM OF CEREBRAL ARTERY (HCC): ICD-10-CM

## 2023-11-01 ENCOUNTER — TELEPHONE (OUTPATIENT)
Dept: FAMILY MEDICINE CLINIC | Facility: CLINIC | Age: 68
End: 2023-11-01

## 2023-11-01 RX ORDER — WARFARIN SODIUM 5 MG/1
5 TABLET ORAL DAILY
Qty: 30 TABLET | Refills: 5 | Status: SHIPPED | OUTPATIENT
Start: 2023-11-01

## 2023-11-01 NOTE — TELEPHONE ENCOUNTER
Pt called stating her gas company was threatening to shut it off tomorrow (11/2), and needed our fax number. Gave fax number of front's machine. Stated they might fax a form of some sort, advised her MS leaves at 4pm today but would let him know to expect some sort of communication.

## 2023-11-02 ENCOUNTER — PATIENT OUTREACH (OUTPATIENT)
Dept: FAMILY MEDICINE CLINIC | Facility: CLINIC | Age: 68
End: 2023-11-02

## 2023-11-02 NOTE — TELEPHONE ENCOUNTER
Pt called back, made appointment for 11/3 in case MS needed to see her - virtual appointment. States [employer] rep told her they needed form by eod Friday or first thing Monday morning. YURI also faxed paperwork, placed in MS's folder.

## 2023-11-03 NOTE — PROGRESS NOTES
[facility] completed a chart review prior to contacting patient. Called patient to follow up on [facility] and also offer to apply for [facility]. Per chart review pt call primary care provider office stating she received a shut off notice from [facility] and is requesting an extension. Spoke to patient states she was finally approved for [facility] care application and was appreciative for [facility] assistance. Offer patient to apply for [facility] to help with her gas bill. Pt agreeable. [employer] application successfully completed and submitted on the [facility] web site. Application Number e-Form # [id number]     [facility] will continue to follow up and assist patient as needed.      Anticipated next contact date: 11/10/2023

## 2023-11-07 ENCOUNTER — APPOINTMENT (OUTPATIENT)
Dept: LAB | Facility: HOSPITAL | Age: 68
End: 2023-11-07
Attending: INTERNAL MEDICINE
Payer: MEDICARE

## 2023-11-07 ENCOUNTER — ANTICOAG VISIT (OUTPATIENT)
Dept: CARDIOLOGY CLINIC | Facility: CLINIC | Age: 68
End: 2023-11-07

## 2023-11-08 ENCOUNTER — TELEPHONE (OUTPATIENT)
Dept: LAB | Facility: HOSPITAL | Age: 68
End: 2023-11-08

## 2023-11-10 ENCOUNTER — PATIENT OUTREACH (OUTPATIENT)
Dept: FAMILY MEDICINE CLINIC | Facility: CLINIC | Age: 68
End: 2023-11-10

## 2023-11-10 NOTE — PROGRESS NOTES
[address] [place] [address] completed a chart review prior to contacting patient. Called patient to follow up on [facility] application. Informed patient application for [facility] was approved. Per chart review patient having difficulty getting to doctors appt. Offer patient to apply for [employer] with [employer] however pt declined states she does not feel comfortable getting on a bus. Pt will think about it and give [address] a call back. No other needs identify at this time. Closing pt referral at this time. Anticipated next contact date: N/A referral closed.

## 2023-11-13 ENCOUNTER — PATIENT OUTREACH (OUTPATIENT)
Dept: FAMILY MEDICINE CLINIC | Facility: CLINIC | Age: 68
End: 2023-11-13

## 2023-11-13 ENCOUNTER — TELEPHONE (OUTPATIENT)
Dept: LAB | Facility: HOSPITAL | Age: 68
End: 2023-11-13

## 2023-11-13 NOTE — PROGRESS NOTES
OP CM rcvd inbasket from [facility] that pt was approved for [facility] and héctor care. Pt denies any other needs at this time. [facility] offered pt [facility] application but pt does not want to apply at this time. Pt aware she can call back OP CM or [facility] if she decides to apply in the future. Will close case at this time. Pt has both our contact info.

## 2023-11-14 ENCOUNTER — APPOINTMENT (OUTPATIENT)
Dept: LAB | Facility: HOSPITAL | Age: 68
End: 2023-11-14
Attending: INTERNAL MEDICINE
Payer: MEDICARE

## 2023-11-16 ENCOUNTER — ANTICOAG VISIT (OUTPATIENT)
Dept: CARDIOLOGY CLINIC | Facility: CLINIC | Age: 68
End: 2023-11-16

## 2023-11-16 ENCOUNTER — TELEPHONE (OUTPATIENT)
Dept: LAB | Facility: HOSPITAL | Age: 68
End: 2023-11-16

## 2023-11-16 LAB
INR PPP: 2.08 (ref 0.84–1.19)
INR PPP: 2.08 (ref 0.84–1.19)

## 2023-11-16 NOTE — PROGRESS NOTES
Patient calling for her INR which was 2.08 and good. Advised to continue same dosing and get another INR in two weeks but patient states lab comes on Tues to her home.   She will retest on 11/28/23

## 2023-11-27 ENCOUNTER — TELEPHONE (OUTPATIENT)
Dept: LAB | Facility: HOSPITAL | Age: 68
End: 2023-11-27

## 2023-11-30 DIAGNOSIS — F33.9 DEPRESSION, RECURRENT (HCC): ICD-10-CM

## 2023-11-30 RX ORDER — ESCITALOPRAM OXALATE 5 MG/1
5 TABLET ORAL DAILY
Qty: 30 TABLET | Refills: 5 | Status: SHIPPED | OUTPATIENT
Start: 2023-11-30

## 2023-12-05 ENCOUNTER — APPOINTMENT (OUTPATIENT)
Dept: LAB | Facility: HOSPITAL | Age: 68
End: 2023-12-05
Attending: INTERNAL MEDICINE
Payer: MEDICARE

## 2023-12-19 ENCOUNTER — APPOINTMENT (OUTPATIENT)
Dept: LAB | Facility: HOSPITAL | Age: 68
End: 2023-12-19
Attending: INTERNAL MEDICINE
Payer: MEDICARE

## 2023-12-19 ENCOUNTER — TELEPHONE (OUTPATIENT)
Dept: CARDIOLOGY CLINIC | Facility: CLINIC | Age: 68
End: 2023-12-19

## 2023-12-19 ENCOUNTER — TELEPHONE (OUTPATIENT)
Dept: LAB | Facility: HOSPITAL | Age: 68
End: 2023-12-19

## 2023-12-19 DIAGNOSIS — I48.91 NEW ONSET A-FIB (HCC): ICD-10-CM

## 2023-12-19 DIAGNOSIS — I48.19 PERSISTENT ATRIAL FIBRILLATION (HCC): Primary | ICD-10-CM

## 2023-12-19 DIAGNOSIS — I63.40 CEREBROVASCULAR ACCIDENT (CVA) DUE TO EMBOLISM OF CEREBRAL ARTERY (HCC): ICD-10-CM

## 2023-12-19 NOTE — TELEPHONE ENCOUNTER
Patient called, states the mobile lab told her she does not have orders for INR, advised her  to call office.    Standing INR order placed.

## 2023-12-19 NOTE — TELEPHONE ENCOUNTER
Contacted pt and confirmed that she needs to speak with Dr. Cisneros to have them put in a standing order for an INR

## 2023-12-29 DIAGNOSIS — I63.40 CEREBROVASCULAR ACCIDENT (CVA) DUE TO EMBOLISM OF CEREBRAL ARTERY (HCC): ICD-10-CM

## 2023-12-29 DIAGNOSIS — I48.91 NEW ONSET A-FIB (HCC): ICD-10-CM

## 2023-12-29 RX ORDER — DILTIAZEM HYDROCHLORIDE 120 MG/1
TABLET, FILM COATED ORAL
Qty: 60 TABLET | Refills: 5 | Status: SHIPPED | OUTPATIENT
Start: 2023-12-29

## 2023-12-29 RX ORDER — ATORVASTATIN CALCIUM 40 MG/1
TABLET, FILM COATED ORAL
Qty: 30 TABLET | Refills: 5 | Status: SHIPPED | OUTPATIENT
Start: 2023-12-29

## 2024-01-02 ENCOUNTER — APPOINTMENT (OUTPATIENT)
Dept: LAB | Facility: HOSPITAL | Age: 69
End: 2024-01-02
Attending: INTERNAL MEDICINE
Payer: MEDICARE

## 2024-01-03 ENCOUNTER — ANTICOAG VISIT (OUTPATIENT)
Dept: CARDIOLOGY CLINIC | Facility: CLINIC | Age: 69
End: 2024-01-03

## 2024-01-19 ENCOUNTER — ANTICOAG VISIT (OUTPATIENT)
Dept: CARDIOLOGY CLINIC | Facility: CLINIC | Age: 69
End: 2024-01-19

## 2024-01-19 ENCOUNTER — APPOINTMENT (OUTPATIENT)
Dept: LAB | Facility: HOSPITAL | Age: 69
End: 2024-01-19
Attending: INTERNAL MEDICINE
Payer: MEDICARE

## 2024-01-26 ENCOUNTER — TELEPHONE (OUTPATIENT)
Dept: LAB | Facility: HOSPITAL | Age: 69
End: 2024-01-26

## 2024-02-14 ENCOUNTER — ANTICOAG VISIT (OUTPATIENT)
Dept: CARDIOLOGY CLINIC | Facility: CLINIC | Age: 69
End: 2024-02-14

## 2024-02-14 ENCOUNTER — APPOINTMENT (OUTPATIENT)
Dept: LAB | Facility: HOSPITAL | Age: 69
End: 2024-02-14
Attending: INTERNAL MEDICINE
Payer: MEDICARE

## 2024-02-21 PROBLEM — J06.9 ACUTE UPPER RESPIRATORY INFECTION: Status: RESOLVED | Noted: 2019-11-11 | Resolved: 2024-02-21

## 2024-02-27 DIAGNOSIS — I48.91 NEW ONSET A-FIB (HCC): ICD-10-CM

## 2024-02-29 RX ORDER — DIGOXIN 125 MCG
125 TABLET ORAL EVERY OTHER DAY
Qty: 45 TABLET | Refills: 1 | Status: SHIPPED | OUTPATIENT
Start: 2024-02-29

## 2024-03-12 ENCOUNTER — APPOINTMENT (OUTPATIENT)
Dept: LAB | Facility: HOSPITAL | Age: 69
End: 2024-03-12
Attending: INTERNAL MEDICINE
Payer: MEDICARE

## 2024-03-12 ENCOUNTER — ANTICOAG VISIT (OUTPATIENT)
Dept: CARDIOLOGY CLINIC | Facility: CLINIC | Age: 69
End: 2024-03-12

## 2024-03-13 ENCOUNTER — TELEPHONE (OUTPATIENT)
Dept: LAB | Facility: HOSPITAL | Age: 69
End: 2024-03-13

## 2024-04-02 ENCOUNTER — APPOINTMENT (OUTPATIENT)
Dept: LAB | Facility: HOSPITAL | Age: 69
End: 2024-04-02
Attending: INTERNAL MEDICINE
Payer: MEDICARE

## 2024-04-02 ENCOUNTER — ANTICOAG VISIT (OUTPATIENT)
Dept: CARDIOLOGY CLINIC | Facility: CLINIC | Age: 69
End: 2024-04-02

## 2024-04-05 ENCOUNTER — TELEPHONE (OUTPATIENT)
Dept: LAB | Facility: HOSPITAL | Age: 69
End: 2024-04-05

## 2024-04-23 ENCOUNTER — ANTICOAG VISIT (OUTPATIENT)
Dept: CARDIOLOGY CLINIC | Facility: CLINIC | Age: 69
End: 2024-04-23

## 2024-04-23 ENCOUNTER — APPOINTMENT (OUTPATIENT)
Dept: LAB | Facility: HOSPITAL | Age: 69
End: 2024-04-23
Payer: MEDICARE

## 2024-04-26 DIAGNOSIS — I63.40 CEREBROVASCULAR ACCIDENT (CVA) DUE TO EMBOLISM OF CEREBRAL ARTERY (HCC): ICD-10-CM

## 2024-04-26 DIAGNOSIS — I48.91 NEW ONSET A-FIB (HCC): ICD-10-CM

## 2024-04-26 RX ORDER — WARFARIN SODIUM 5 MG/1
5 TABLET ORAL DAILY
Qty: 30 TABLET | Refills: 1 | Status: SHIPPED | OUTPATIENT
Start: 2024-04-26

## 2024-04-26 NOTE — TELEPHONE ENCOUNTER
Patient's daughter is calling for an appointment.  Justine was due back in March but was unable to get one.

## 2024-05-03 ENCOUNTER — TELEPHONE (OUTPATIENT)
Dept: LAB | Facility: HOSPITAL | Age: 69
End: 2024-05-03

## 2024-05-13 ENCOUNTER — TELEPHONE (OUTPATIENT)
Dept: LAB | Facility: HOSPITAL | Age: 69
End: 2024-05-13

## 2024-05-14 ENCOUNTER — OFFICE VISIT (OUTPATIENT)
Dept: FAMILY MEDICINE CLINIC | Facility: CLINIC | Age: 69
End: 2024-05-14
Payer: MEDICARE

## 2024-05-14 ENCOUNTER — APPOINTMENT (OUTPATIENT)
Dept: LAB | Facility: CLINIC | Age: 69
End: 2024-05-14
Payer: MEDICARE

## 2024-05-14 VITALS
BODY MASS INDEX: 31.07 KG/M2 | HEART RATE: 73 BPM | WEIGHT: 182 LBS | DIASTOLIC BLOOD PRESSURE: 68 MMHG | SYSTOLIC BLOOD PRESSURE: 120 MMHG | OXYGEN SATURATION: 97 % | HEIGHT: 64 IN

## 2024-05-14 DIAGNOSIS — I63.40 CEREBROVASCULAR ACCIDENT (CVA) DUE TO EMBOLISM OF CEREBRAL ARTERY (HCC): ICD-10-CM

## 2024-05-14 DIAGNOSIS — I48.19 PERSISTENT ATRIAL FIBRILLATION (HCC): Primary | ICD-10-CM

## 2024-05-14 DIAGNOSIS — F33.9 DEPRESSION, RECURRENT (HCC): ICD-10-CM

## 2024-05-14 DIAGNOSIS — R60.0 LEG EDEMA, RIGHT: ICD-10-CM

## 2024-05-14 DIAGNOSIS — E78.00 HYPERCHOLESTEROLEMIA: ICD-10-CM

## 2024-05-14 DIAGNOSIS — Z12.31 ENCOUNTER FOR SCREENING MAMMOGRAM FOR MALIGNANT NEOPLASM OF BREAST: ICD-10-CM

## 2024-05-14 DIAGNOSIS — Z12.11 SCREENING FOR COLON CANCER: ICD-10-CM

## 2024-05-14 PROCEDURE — 99214 OFFICE O/P EST MOD 30 MIN: CPT | Performed by: PHYSICIAN ASSISTANT

## 2024-05-14 PROCEDURE — G2211 COMPLEX E/M VISIT ADD ON: HCPCS | Performed by: PHYSICIAN ASSISTANT

## 2024-05-14 RX ORDER — FUROSEMIDE 20 MG/1
20 TABLET ORAL DAILY
Qty: 30 TABLET | Refills: 0 | Status: SHIPPED | OUTPATIENT
Start: 2024-05-14

## 2024-05-14 NOTE — PROGRESS NOTES
Name: Justine Nguyen      : 1955      MRN: [id number]  Encounter Provider: Manuel Willett PA-C  Encounter Date: 2024   Encounter department: [facility]    Assessment & Plan     Patient Instructions   Assessment/plan:  1.  Persistent atrial fibrillation-presently stable per cardiology, currently on warfarin therapy and rate control with diltiazem and digoxin.  2.  Hyperlipidemia-stable on atorvastatin 40 mg daily.  3.  History of CVA-stable on statin and Coumadin therapy.  4.  Moderate major depression-stable on Lexapro 5 mg daily.  5.  Edema of the right lower extremity-patient does have quite a bit of asymmetry.  She states this was like this since she had a fall several years back.  Would recommend venous duplex ultrasound to rule out chronic DVT.  She is currently on warfarin therapy.  Would recommend Lasix 20 mg daily and will assess labs in about 3 to 4 weeks including potassium and renal function.  She will also be encouraged to eat potassium rich foods such as bananas over-the-counter.    Patient Instructions   Assessment/plan:  1.  Persistent atrial fibrillation-presently stable per cardiology, currently on warfarin therapy and rate control with diltiazem and digoxin.  2.  Hyperlipidemia-stable on atorvastatin 40 mg daily.  3.  History of CVA-stable on statin and Coumadin therapy.  4.  Moderate major depression-stable on Lexapro 5 mg daily.  5.  Edema of the right lower extremity-patient does have quite a bit of asymmetry.  She states this was like this since she had a fall several years back.  Would recommend venous duplex ultrasound to rule out chronic DVT.  She is currently on warfarin therapy.  Would recommend Lasix 20 mg daily and will assess labs in about 3 to 4 weeks including potassium and renal function.  She will also be encouraged to eat potassium rich foods such as bananas over-the-counter.    1. Persistent atrial fibrillation (HCC)  -     CBC and  differential  -     Comprehensive metabolic panel  -     Lipid Panel with Direct LDL reflex  -     TSH, 3rd generation with Free T4 reflex    2. Cerebrovascular accident (CVA) due to embolism of cerebral artery (HCC)  -     CBC and differential  -     Comprehensive metabolic panel  -     Lipid Panel with Direct LDL reflex  -     TSH, 3rd generation with Free T4 reflex    3. Depression, recurrent (HCC)  -     CBC and differential  -     Comprehensive metabolic panel  -     Lipid Panel with Direct LDL reflex  -     TSH, 3rd generation with Free T4 reflex    4. Hypercholesterolemia  -     CBC and differential  -     Comprehensive metabolic panel  -     Lipid Panel with Direct LDL reflex  -     TSH, 3rd generation with Free T4 reflex    5. Encounter for screening mammogram for malignant neoplasm of breast  -     Mammo screening bilateral w 3d & cad; Future    6. Screening for colon cancer  -     Ambulatory Referral to Gastroenterology; Future    7. Leg edema, right  -     VAS VENOUS DUPLEX -LOWER LIMB UNILATERAL; Future; Expected date: 05/14/2024  -     furosemide (LASIX) 20 mg tablet; Take 1 tablet (20 mg total) by mouth daily           Subjective      HPI: This is a 69-year-old female who presents to the office for follow-up of chronic health conditions.  It has been almost a year since she has been seen.  At her last visit in summer 2023 she was started on Lexapro 5 mg daily.  She does feel that the medication has been helping with anxiety symptoms and depression.  She had a stroke around that time and was started on multiple medications.  She has been doing well and continues to follow with cardiologist for Coumadin therapy as well as digoxin.  Her diltiazem has been stopped because her blood pressure was going too low.  She also continues atorvastatin for stroke prevention.  She does complain of swelling in her right lower extremity which has been present for over a year.  She believes this started after a fall  "that she had quite sometime ago.      Review of Systems   Constitutional:  Negative for chills, fatigue and fever.   HENT:  Negative for congestion, ear pain and sinus pressure.    Eyes:  Negative for visual disturbance.   Respiratory:  Negative for cough, chest tightness and shortness of breath.    Cardiovascular:  Positive for leg swelling. Negative for chest pain and palpitations.   Gastrointestinal:  Negative for diarrhea, nausea and vomiting.   Endocrine: Negative for polyuria.   Genitourinary:  Negative for dysuria and frequency.   Musculoskeletal:  Negative for arthralgias and myalgias.   Skin:  Negative for pallor and rash.   Neurological:  Negative for dizziness, weakness, light-headedness, numbness and headaches.   Psychiatric/Behavioral:  Negative for agitation, behavioral problems and sleep disturbance.    All other systems reviewed and are negative.      Current Outpatient Medications on File Prior to Visit   Medication Sig   • atorvastatin (LIPITOR) 40 mg tablet take 1 tablet by mouth once daily with dinner   • digoxin (LANOXIN) 0.125 mg tablet Take 1 tablet (125 mcg total) by mouth every other day   • escitalopram (LEXAPRO) 5 mg tablet take 1 tablet by mouth once daily   • warfarin (COUMADIN) 5 mg tablet take 1 tablet by mouth once daily   • [DISCONTINUED] diltiazem (CARDIZEM) 120 MG tablet take 1 tablet by mouth every 12 hours       Objective     /68 (BP Location: Left arm, Patient Position: Sitting, Cuff Size: Standard)   Pulse 73   Ht 5' 4\" (1.626 m)   Wt 82.6 kg (182 lb)   SpO2 97%   BMI 31.24 kg/m²     Physical Exam  Vitals and nursing note reviewed.   Constitutional:       General: She is not in acute distress.     Appearance: She is well-developed.   HENT:      Head: Normocephalic and atraumatic.      Right Ear: External ear normal.      Left Ear: External ear normal.      Nose: Nose normal.      Mouth/Throat:      Pharynx: No oropharyngeal exudate.   Eyes:      Conjunctiva/sclera: " Conjunctivae normal.      Pupils: Pupils are equal, round, and reactive to light.   Neck:      Thyroid: No thyromegaly.      Trachea: No tracheal deviation.   Cardiovascular:      Rate and Rhythm: Normal rate and regular rhythm.      Heart sounds: Normal heart sounds. No murmur heard.     No friction rub.   Pulmonary:      Effort: Pulmonary effort is normal. No respiratory distress.      Breath sounds: Normal breath sounds. No wheezing or rales.   Abdominal:      General: Bowel sounds are normal. There is no distension.      Palpations: Abdomen is soft.      Tenderness: There is no abdominal tenderness. There is no guarding or rebound.   Musculoskeletal:         General: No tenderness. Normal range of motion.      Cervical back: Normal range of motion and neck supple.      Right lower leg: Edema present.      Left lower leg: No edema.      Comments: 2+ pitting edema to the mid tibia on the right lower extremity.   Lymphadenopathy:      Cervical: No cervical adenopathy.   Skin:     General: Skin is warm and dry.      Findings: No erythema or rash.   Neurological:      Mental Status: She is alert and oriented to person, place, and time.      Cranial Nerves: No cranial nerve deficit.      Coordination: Coordination normal.   Psychiatric:         Behavior: Behavior normal.         Thought Content: Thought content normal.         PHQ-9 Depression Screening    Little interest or pleasure in doing things: 1 - several days  Feeling down, depressed, or hopeless: 1 - several days  Trouble falling or staying asleep, or sleeping too much: 0 - not at all  Feeling tired or having little energy: 1 - several days  Poor appetite or overeatin - not at all  Feeling bad about yourself - or that you are a failure or have let yourself or your family down: 1 - several days  Trouble concentrating on things, such as reading the newspaper or watching television: 0 - not at all  Moving or speaking so slowly that other people could have  noticed. Or the opposite - being so fidgety or restless that you have been moving around a lot more than usual: 0 - not at all  Thoughts that you would be better off dead, or of hurting yourself in some way: 0 - not at all  PHQ-9 Score: 4  Score Interpretation: No or Minimal depression        Manuel Willett PA-C

## 2024-05-14 NOTE — PATIENT INSTRUCTIONS
Assessment/plan:  1.  Persistent atrial fibrillation-presently stable per cardiology, currently on warfarin therapy and rate control with diltiazem and digoxin.  2.  Hyperlipidemia-stable on atorvastatin 40 mg daily.  3.  History of CVA-stable on statin and Coumadin therapy.  4.  Moderate major depression-stable on Lexapro 5 mg daily.  5.  Edema of the right lower extremity-patient does have quite a bit of asymmetry.  She states this was like this since she had a fall several years back.  Would recommend venous duplex ultrasound to rule out chronic DVT.  She is currently on warfarin therapy.  Would recommend Lasix 20 mg daily and will assess labs in about 3 to 4 weeks including potassium and renal function.  She will also be encouraged to eat potassium rich foods such as bananas over-the-counter.

## 2024-05-15 ENCOUNTER — TELEPHONE (OUTPATIENT)
Dept: LAB | Facility: HOSPITAL | Age: 69
End: 2024-05-15

## 2024-05-15 ENCOUNTER — ANTICOAG VISIT (OUTPATIENT)
Dept: CARDIOLOGY CLINIC | Facility: CLINIC | Age: 69
End: 2024-05-15

## 2024-05-28 ENCOUNTER — ANTICOAG VISIT (OUTPATIENT)
Dept: CARDIOLOGY CLINIC | Facility: CLINIC | Age: 69
End: 2024-05-28

## 2024-05-28 ENCOUNTER — APPOINTMENT (OUTPATIENT)
Dept: LAB | Facility: HOSPITAL | Age: 69
End: 2024-05-28
Payer: MEDICARE

## 2024-05-28 DIAGNOSIS — F33.9 DEPRESSION, RECURRENT (HCC): ICD-10-CM

## 2024-05-28 LAB
ALBUMIN SERPL BCP-MCNC: 4.3 G/DL (ref 3.5–5)
ALP SERPL-CCNC: 101 U/L (ref 34–104)
ALT SERPL W P-5'-P-CCNC: 20 U/L (ref 7–52)
ANION GAP SERPL CALCULATED.3IONS-SCNC: 13 MMOL/L (ref 4–13)
AST SERPL W P-5'-P-CCNC: 26 U/L (ref 13–39)
BASOPHILS # BLD AUTO: 0.09 THOUSANDS/ÂΜL (ref 0–0.1)
BASOPHILS NFR BLD AUTO: 1 % (ref 0–1)
BILIRUB SERPL-MCNC: 0.52 MG/DL (ref 0.2–1)
BUN SERPL-MCNC: 14 MG/DL (ref 5–25)
CALCIUM SERPL-MCNC: 9.2 MG/DL (ref 8.4–10.2)
CHLORIDE SERPL-SCNC: 103 MMOL/L (ref 96–108)
CHOLEST SERPL-MCNC: 178 MG/DL
CO2 SERPL-SCNC: 24 MMOL/L (ref 21–32)
CREAT SERPL-MCNC: 0.62 MG/DL (ref 0.6–1.3)
EOSINOPHIL # BLD AUTO: 0.21 THOUSAND/ÂΜL (ref 0–0.61)
EOSINOPHIL NFR BLD AUTO: 3 % (ref 0–6)
ERYTHROCYTE [DISTWIDTH] IN BLOOD BY AUTOMATED COUNT: 14.3 % (ref 11.6–15.1)
GFR SERPL CREATININE-BSD FRML MDRD: 92 ML/MIN/1.73SQ M
GLUCOSE SERPL-MCNC: 87 MG/DL (ref 65–140)
HCT VFR BLD AUTO: 45.2 % (ref 34.8–46.1)
HDLC SERPL-MCNC: 54 MG/DL
HGB BLD-MCNC: 14.3 G/DL (ref 11.5–15.4)
IMM GRANULOCYTES # BLD AUTO: 0.04 THOUSAND/UL (ref 0–0.2)
IMM GRANULOCYTES NFR BLD AUTO: 1 % (ref 0–2)
LDLC SERPL CALC-MCNC: 99 MG/DL (ref 0–100)
LYMPHOCYTES # BLD AUTO: 1.89 THOUSANDS/ÂΜL (ref 0.6–4.47)
LYMPHOCYTES NFR BLD AUTO: 27 % (ref 14–44)
MCH RBC QN AUTO: 28.7 PG (ref 26.8–34.3)
MCHC RBC AUTO-ENTMCNC: 31.6 G/DL (ref 31.4–37.4)
MCV RBC AUTO: 91 FL (ref 82–98)
MONOCYTES # BLD AUTO: 0.53 THOUSAND/ÂΜL (ref 0.17–1.22)
MONOCYTES NFR BLD AUTO: 7 % (ref 4–12)
NEUTROPHILS # BLD AUTO: 4.36 THOUSANDS/ÂΜL (ref 1.85–7.62)
NEUTS SEG NFR BLD AUTO: 61 % (ref 43–75)
NRBC BLD AUTO-RTO: 0 /100 WBCS
PLATELET # BLD AUTO: 327 THOUSANDS/UL (ref 149–390)
PMV BLD AUTO: 10.5 FL (ref 8.9–12.7)
POTASSIUM SERPL-SCNC: 3.9 MMOL/L (ref 3.5–5.3)
PROT SERPL-MCNC: 7.5 G/DL (ref 6.4–8.4)
RBC # BLD AUTO: 4.98 MILLION/UL (ref 3.81–5.12)
SODIUM SERPL-SCNC: 140 MMOL/L (ref 135–147)
TRIGL SERPL-MCNC: 126 MG/DL
TSH SERPL DL<=0.05 MIU/L-ACNC: 2.26 UIU/ML (ref 0.45–4.5)
WBC # BLD AUTO: 7.12 THOUSAND/UL (ref 4.31–10.16)

## 2024-05-28 NOTE — PROGRESS NOTES
Spoke with patient, advised INR good, current dose verified, continue 2.5 mg Mon Wed Fri, 5 mg all other days, will recheck in 2 weeks 6/11/24  Also left message with Sharmaine with instructions.

## 2024-05-29 RX ORDER — ESCITALOPRAM OXALATE 5 MG/1
5 TABLET ORAL DAILY
Qty: 30 TABLET | Refills: 5 | Status: SHIPPED | OUTPATIENT
Start: 2024-05-29

## 2024-06-01 DIAGNOSIS — R60.0 LEG EDEMA, RIGHT: ICD-10-CM

## 2024-06-02 RX ORDER — FUROSEMIDE 20 MG/1
20 TABLET ORAL DAILY
Qty: 30 TABLET | Refills: 5 | Status: SHIPPED | OUTPATIENT
Start: 2024-06-02

## 2024-06-03 ENCOUNTER — TELEPHONE (OUTPATIENT)
Age: 69
End: 2024-06-03

## 2024-06-03 ENCOUNTER — TELEPHONE (OUTPATIENT)
Dept: CARDIOLOGY CLINIC | Facility: CLINIC | Age: 69
End: 2024-06-03

## 2024-06-03 NOTE — TELEPHONE ENCOUNTER
Patient called the RX Refill Line. Message is being forwarded to the office.     Patient is requesting diltiazem be refilled, she state she got a vm and is returning the call because she need her diltiazem and the pharmacy is telling her she need to have us call the script in, per VM left for patient I advise the medication was discontinued on 05.14. due to BP being low, patient state she was not aware of this and she is still taking diltiazem and need a refill sent to [facility] in [place] on [facility]     Please contact patient at [phone] with any questions or concerns

## 2024-06-03 NOTE — TELEPHONE ENCOUNTER
CTS Can Discuss.     The office received a fax from [facility] in [place] stating that the patient is need a refill for her Diltiazem 120MG. When looking into the chart, this medication is no longer on the patient's list. On May 14th kvng brown PA-C discontinued this medication due to her BP's being to low. I reached out to patient to verify if this patient is still taking this or not and if she needs this refill. Patient did not answer phone. Left voicemail to have patient return office phone call.

## 2024-06-03 NOTE — TELEPHONE ENCOUNTER
Caller: Justine Nguyen    Doctor: Neo    Reason for call: Pt called stating there is a mix up regarding her medication. The medication is Diltiazem. There is a note from [facility] in the chart saying it isn't on her med list anymore. Please call pt at:    Call back#: [phone]

## 2024-06-06 NOTE — TELEPHONE ENCOUNTER
Sharmaine the daughter called went over the telephone call on 6/3/2024.   Per Afsaneh they were not aware that Diltiazem had been discontinued by pcp , has not stopped it.    She has been taking is q12 hrs.   She does not take BP at home. Did advise if able, might be a good option to see what her BP/HR are ranging.   Verbally understood     Please advise asking for refill

## 2024-06-09 PROBLEM — R94.31 LONG QT INTERVAL: Status: RESOLVED | Noted: 2019-04-25 | Resolved: 2024-06-09

## 2024-06-09 NOTE — ASSESSMENT & PLAN NOTE
Smoking Status  Former  Started  3/1/1983 - 3/1/2023 Quit  Types  Cigarettes from 3/1/1983 to 3/1/2023  Amount  Average: 1 pack/day for 40.0 years; Total pack years: 40.0

## 2024-06-09 NOTE — ASSESSMENT & PLAN NOTE
Digoxin 125 mcg every other day, digoxin level 3/11/2023, 0.9  Warfarin anticoagulation  Furosemide 20 mg daily p.o.

## 2024-06-09 NOTE — ASSESSMENT & PLAN NOTE
Lab Results   Component Value Date    CHOLESTEROL 178 05/28/2024     Lab Results   Component Value Date    TRIG 126 05/28/2024     Lab Results   Component Value Date    HDL 54 05/28/2024     Lab Results   Component Value Date    LDLCALC 99 05/28/2024     Atorvastatin 40 mg daily

## 2024-06-09 NOTE — ASSESSMENT & PLAN NOTE
Less than 1% with no ventricular tachycardia  4/28/2023 Zio patch monitor  - Isolated VEs were rare (<1.0%, 5353), VE Couplets were rare (<1.0%, 195), and VE Triplets were rare (<1.0%, 7). Ventricular Bigeminy and Trigeminy were present.

## 2024-06-11 ENCOUNTER — APPOINTMENT (OUTPATIENT)
Dept: LAB | Facility: HOSPITAL | Age: 69
End: 2024-06-11
Payer: MEDICARE

## 2024-06-11 ENCOUNTER — OFFICE VISIT (OUTPATIENT)
Dept: CARDIOLOGY CLINIC | Facility: CLINIC | Age: 69
End: 2024-06-11
Payer: MEDICARE

## 2024-06-11 ENCOUNTER — ANTICOAG VISIT (OUTPATIENT)
Dept: CARDIOLOGY CLINIC | Facility: CLINIC | Age: 69
End: 2024-06-11

## 2024-06-11 VITALS
SYSTOLIC BLOOD PRESSURE: 120 MMHG | HEIGHT: 66 IN | HEART RATE: 82 BPM | DIASTOLIC BLOOD PRESSURE: 80 MMHG | WEIGHT: 181 LBS | BODY MASS INDEX: 29.09 KG/M2

## 2024-06-11 DIAGNOSIS — I48.19 PERSISTENT ATRIAL FIBRILLATION (HCC): ICD-10-CM

## 2024-06-11 DIAGNOSIS — Z87.891 HISTORY OF TOBACCO ABUSE: ICD-10-CM

## 2024-06-11 DIAGNOSIS — I49.3 PVC'S (PREMATURE VENTRICULAR CONTRACTIONS): ICD-10-CM

## 2024-06-11 DIAGNOSIS — E78.00 HYPERCHOLESTEROLEMIA: ICD-10-CM

## 2024-06-11 DIAGNOSIS — I63.40 CEREBROVASCULAR ACCIDENT (CVA) DUE TO EMBOLISM OF CEREBRAL ARTERY (HCC): ICD-10-CM

## 2024-06-11 DIAGNOSIS — I48.19 PERSISTENT ATRIAL FIBRILLATION (HCC): Primary | ICD-10-CM

## 2024-06-11 LAB — DIGOXIN SERPL-MCNC: 0.3 NG/ML (ref 0.8–2)

## 2024-06-11 PROCEDURE — 80162 ASSAY OF DIGOXIN TOTAL: CPT

## 2024-06-11 PROCEDURE — 99214 OFFICE O/P EST MOD 30 MIN: CPT | Performed by: INTERNAL MEDICINE

## 2024-06-11 PROCEDURE — 93000 ELECTROCARDIOGRAM COMPLETE: CPT | Performed by: INTERNAL MEDICINE

## 2024-06-11 NOTE — PROGRESS NOTES
[facility]  [address] [place] [address]  Phone#  [phone]   Fax#  [phone]  *-*-*-*-*-*-*-*-*-*-*-*-*-*-*-*-*-*-*-*-*-*-*-*-*-*-*-*-*-*-*-*-*-*-*-*-*-*-*-*-*-*-*-*-*-*-*-*-*-*-*-*-*-*                                   Cardiology Follow Up      ENCOUNTER DATE: 24 9:09 AM  PATIENT NAME: Justine Nguyen   : 1955    MRN: [id number]  AGE:69 y.o.      SEX: female  ENCOUNTER PROVIDER:Dave Larson MD     PRIMARY CARE PHYSICIAN: Manuel Willett PA-C    CARDIOLOGY SPECIALTY COMMENTS  3/6-3/11/2023 [facility]: 68-year-old female presents for follow-up post recent hospitalization. Patient presenting with a headache. She was found to have embolic strokes and newly diagnosed atrial fibrillation. Patient without insurance coverage for NOAC so she was initiated on Coumadin and follows with the Coumadin clinic.    2023 echocardiogram: Normal left ventricular systolic function, EF 54%.  RVE, CIRO AND LAE.  Mild TR.    2023 ambulatory extended Holter monitor:  - Atrial Fibrillation occurred continuously (100% burden), ranging from 39-  143 bpm (avg of 69 bpm).   - Isolated VEs were rare (<1.0%, 5353), VE Couplets were rare (<1.0%, 195), and VE Triplets were rare (<1.0%, 7). Ventricular Bigeminy and Trigeminy were present.    ACTIVE PROBLEM LIST  Patient Active Problem List   Diagnosis    Allergic rhinitis    Depression, recurrent (HCC)    Hypercholesterolemia    Former history of tobacco abuse    Tremor of both hands    Head movements abnormal    Persistent atrial fibrillation (HCC)    Cerebrovascular accident (CVA) due to embolism of cerebral artery (HCC)    Migraine with aura and without status migrainosus, not intractable    Abnormal magnetic resonance imaging of kidney    Cerebral edema (HCC)    PVC's (premature ventricular contractions)       ACTIVE DIAGNOSIS AND PLAN    1. Persistent atrial fibrillation (HCC)  Assessment & Plan:  Digoxin 125 mcg every  other day, digoxin level 3/11/2023, 0.9  Warfarin anticoagulation  Furosemide 20 mg daily p.o.  Orders:  -     POCT ECG  -     Digoxin level; Future  -     Holter monitor; Future; Expected date: 06/18/2024  2. Cerebrovascular accident (CVA) due to embolism of cerebral artery (HCC)  Assessment & Plan:  Most likely secondary to persistent atrial fibrillation  Coumadin anticoagulation  3. Hypercholesterolemia  Assessment & Plan:  Lab Results   Component Value Date    CHOLESTEROL 178 05/28/2024     Lab Results   Component Value Date    TRIG 126 05/28/2024     Lab Results   Component Value Date    HDL 54 05/28/2024     Lab Results   Component Value Date    LDLCALC 99 05/28/2024     Atorvastatin 40 mg daily  4. PVC's (premature ventricular contractions)  Assessment & Plan:  Less than 1% with no ventricular tachycardia  4/28/2023 Zio patch monitor  - Isolated VEs were rare (<1.0%, 5353), VE Couplets were rare (<1.0%, 195), and VE Triplets were rare (<1.0%, 7). Ventricular Bigeminy and Trigeminy were present.   Orders:  -     Holter monitor; Future; Expected date: 06/18/2024  5. Former history of tobacco abuse  Assessment & Plan:  Smoking Status  Former  Started  3/1/1983 - 3/1/2023 Quit  Types  Cigarettes from 3/1/1983 to 3/1/2023  Amount  Average: 1 pack/day for 40.0 years; Total pack years: 40.0     INTERVAL HISTORY:        Patient with chronic atrial fibrillation was on digoxin and diltiazem  mg daily.  Her family doctor stopped the diltiazem because her blood pressure was low although the patient's daughter said it was in the 120s.  Not sure whether she needs a diltiazem or not at the present time.    Patient denies cardiac symptoms. She denies chest discomfort or shortness of breath.  She has no palpitations.  She denies symptoms of dizziness, lightheadedness or near-syncope/syncope.  She denies leg edema.  She denies symptoms of orthopnea or paroxysmal nocturnal dyspnea.    DISCUSSION/PLAN:          Digoxin  level  24-hour Holter monitor  Return in 2 months    Results for orders placed or performed in visit on 06/11/24   POCT ECG    Narrative    Atrial fibrillation with controlled rate 82 bpm.  Incomplete right bundle branch block pattern.  Abnormal EKG.         Lab Studies:    Lab Results   Component Value Date    CHOLESTEROL 178 05/28/2024    CHOLESTEROL 139 08/15/2023    CHOLESTEROL 227 (H) 03/06/2023     Lab Results   Component Value Date    TRIG 126 05/28/2024    TRIG 39 08/15/2023    TRIG 73 03/06/2023     Lab Results   Component Value Date    HDL 54 05/28/2024    HDL 57 08/15/2023    HDL 54 03/06/2023     Lab Results   Component Value Date    LDLCALC 99 05/28/2024    LDLCALC 74 08/15/2023    LDLCALC 158 (H) 03/06/2023     Lab Results   Component Value Date    NONHDL 203 (H) 04/26/2019       Lab Results   Component Value Date    HGBA1C 5.6 03/06/2023      Lab Results   Component Value Date    EGFR 92 05/28/2024    EGFR 96 03/11/2023    EGFR 97 03/07/2023    SODIUM 140 05/28/2024    SODIUM 139 03/11/2023    SODIUM 138 03/07/2023    K 3.9 05/28/2024    K 4.0 03/11/2023    K 3.8 03/07/2023     05/28/2024     03/11/2023     03/07/2023    CO2 24 05/28/2024    CO2 26 03/11/2023    CO2 23 03/07/2023    BUN 14 05/28/2024    BUN 12 03/11/2023    BUN 10 03/07/2023    CREATININE 0.62 05/28/2024    CREATININE 0.56 (L) 03/11/2023    CREATININE 0.54 (L) 03/07/2023    MG 2.0 03/06/2023     Lab Results   Component Value Date    WBC 7.12 05/28/2024    WBC 9.65 03/11/2023    WBC 11.05 (H) 03/07/2023    HGB 14.3 05/28/2024    HGB 13.2 03/11/2023    HGB 12.8 03/07/2023    HCT 45.2 05/28/2024    HCT 40.4 03/11/2023    HCT 38.4 03/07/2023    MCV 91 05/28/2024    MCV 88 03/11/2023    MCV 88 03/07/2023    MCH 28.7 05/28/2024    MCH 28.9 03/11/2023    MCH 29.3 03/07/2023    MCHC 31.6 05/28/2024    MCHC 32.7 03/11/2023    MCHC 33.3 03/07/2023     05/28/2024     03/11/2023     03/07/2023      Lab  Results   Component Value Date    CALCIUM 9.2 2024    CALCIUM 9.1 2023    CALCIUM 8.7 2023    ALB 4.3 2024    ALB 4.2 2023    ALB 4.5 2019    TP 7.5 2024    TP 7.4 2023    TP 7.1 2019    AST 26 2024    AST 21 2023    AST 21 2019    ALT 20 2024    ALT 12 2023    ALT 15 2019    ALKPHOS 101 2024    ALKPHOS 72 2023    ALKPHOS 64 2019       Lab Results   Component Value Date     (H) 2023    DIGOXIN 1.5 2023    DIGOXIN 0.9 2023       Current Outpatient Medications:     atorvastatin (LIPITOR) 40 mg tablet, take 1 tablet by mouth once daily with dinner, Disp: 30 tablet, Rfl: 5    digoxin (LANOXIN) 0.125 mg tablet, Take 1 tablet (125 mcg total) by mouth every other day, Disp: 45 tablet, Rfl: 1    escitalopram (LEXAPRO) 5 mg tablet, take 1 tablet by mouth once daily, Disp: 30 tablet, Rfl: 5    warfarin (COUMADIN) 5 mg tablet, take 1 tablet by mouth once daily, Disp: 30 tablet, Rfl: 1  No Known Allergies    Past Medical History:   Diagnosis Date    Migraine     Premature supraventricular beats      Social History     Socioeconomic History    Marital status:      Spouse name: Not on file    Number of children: 1    Years of education: Not on file    Highest education level: Not on file   Occupational History    Occupation: EMPLOYED   Tobacco Use    Smoking status: Former     Current packs/day: 0.00     Average packs/day: 1 pack/day for 40.0 years (40.0 ttl pk-yrs)     Types: Cigarettes     Start date: 3/1/1983     Quit date: 3/1/2023     Years since quittin.2     Passive exposure: Past    Smokeless tobacco: Never   Vaping Use    Vaping status: Every Day   Substance and Sexual Activity    Alcohol use: No     Comment: (HISTORY)    Drug use: No    Sexual activity: Not Currently   Other Topics Concern    Not on file   Social History Narrative     [facility]     Social  Determinants of Health     Financial Resource Strain: Medium Risk (6/6/2023)    Overall Financial Resource Strain (CARDIA)     Difficulty of Paying Living Expenses: Somewhat hard   Food Insecurity: No Food Insecurity (3/7/2023)    Hunger Vital Sign     Worried About Running Out of Food in the Last Year: Never true     Ran Out of Food in the Last Year: Never true   Transportation Needs: Unmet Transportation Needs (6/6/2023)    PRAPARE - Transportation     Lack of Transportation (Medical): Yes     Lack of Transportation (Non-Medical): Yes   Physical Activity: Not on file   Stress: Not on file   Social Connections: Not on file   Intimate Partner Violence: Not on file   Housing Stability: Low Risk  (3/7/2023)    Housing Stability Vital Sign     Unable to Pay for Housing in the Last Year: No     Number of Places Lived in the Last Year: 1     Unstable Housing in the Last Year: No      Family History   Problem Relation Age of Onset    Colon cancer Father     Colon cancer Brother      No past surgical history on file.    PREVIOUS WEIGHTS:   Wt Readings from Last 10 Encounters:   06/11/24 82.1 kg (181 lb)   05/14/24 82.6 kg (182 lb)   09/22/23 66.1 kg (145 lb 12.8 oz)   08/10/23 65.9 kg (145 lb 3.2 oz)   07/11/23 65.5 kg (144 lb 6.4 oz)   06/06/23 66.2 kg (146 lb)   04/04/23 67.6 kg (149 lb)   03/17/23 70.3 kg (155 lb)   03/11/23 69.7 kg (153 lb 9.6 oz)   03/07/23 78.4 kg (172 lb 13.5 oz)        Review of Systems:  Review of Systems   Respiratory:  Negative for cough, choking, chest tightness, shortness of breath and wheezing.    Cardiovascular:  Negative for chest pain, palpitations and leg swelling.   Musculoskeletal:  Negative for gait problem.   Skin:  Negative for rash.   Neurological:  Negative for dizziness, tremors, syncope, weakness, light-headedness, numbness and headaches.   Psychiatric/Behavioral:  Negative for agitation and behavioral problems. The patient is not hyperactive.        Physical Exam:  /80    "Pulse 82   Ht 5' 6\" (1.676 m)   Wt 82.1 kg (181 lb)   BMI 29.21 kg/m²     Physical Exam  Constitutional:       General: She is not in acute distress.     Appearance: She is well-developed.   HENT:      Head: Normocephalic and atraumatic.   Neck:      Thyroid: No thyromegaly.      Vascular: No carotid bruit or JVD.      Trachea: No tracheal deviation.   Cardiovascular:      Rate and Rhythm: Normal rate and regular rhythm.      Pulses: Normal pulses.      Heart sounds: Normal heart sounds. No murmur heard.     No friction rub. No gallop.   Pulmonary:      Effort: Pulmonary effort is normal. No respiratory distress.      Breath sounds: Normal breath sounds. No wheezing, rhonchi or rales.   Chest:      Chest wall: No tenderness.   Musculoskeletal:         General: Normal range of motion.      Cervical back: Normal range of motion and neck supple.      Right lower leg: No edema.      Left lower leg: No edema.   Skin:     General: Skin is warm and dry.   Neurological:      General: No focal deficit present.      Mental Status: She is alert and oriented to person, place, and time.   Psychiatric:         Mood and Affect: Mood normal.         Behavior: Behavior normal.         Thought Content: Thought content normal.         Judgment: Judgment normal.       ======================================================  Imaging:       Portions of the record may have been created with voice recognition software. Occasional wrong word or \"sound a like\" substitutions may have occurred due to the inherent limitations of voice recognition software. Read the chart carefully and recognize, using context, where substitutions have occurred.    SIGNATURES:   Dave Larson MD   "

## 2024-06-12 ENCOUNTER — TELEPHONE (OUTPATIENT)
Dept: LAB | Facility: HOSPITAL | Age: 69
End: 2024-06-12

## 2024-06-17 ENCOUNTER — TELEPHONE (OUTPATIENT)
Dept: CARDIOLOGY CLINIC | Facility: CLINIC | Age: 69
End: 2024-06-17

## 2024-06-17 NOTE — TELEPHONE ENCOUNTER
----- Message from Dave Larson MD sent at 6/17/2024  9:29 AM EDT -----  Please call patient and see if she has been taking her digoxin faithfully every other day.  Her digoxin level is very low compared to what it was a year ago.  That would suggest that she is either not taking it or maybe she is taking it with a lot of food and it is not being absorbed.  It needs to be taken on an empty stomach and wait 1/2-hour before eating anything.  ----- Message -----  From: Lab, Background User  Sent: 6/11/2024   3:58 PM EDT  To: Dave Larson MD

## 2024-06-17 NOTE — TELEPHONE ENCOUNTER
Lm message for daughter Mallory with message from Dr Larson in reference to dig level. Left my name and number if she has any questions or concerns.

## 2024-06-24 DIAGNOSIS — I63.40 CEREBROVASCULAR ACCIDENT (CVA) DUE TO EMBOLISM OF CEREBRAL ARTERY (HCC): ICD-10-CM

## 2024-06-25 ENCOUNTER — ANTICOAG VISIT (OUTPATIENT)
Dept: CARDIOLOGY CLINIC | Facility: CLINIC | Age: 69
End: 2024-06-25

## 2024-06-25 ENCOUNTER — APPOINTMENT (OUTPATIENT)
Dept: LAB | Facility: HOSPITAL | Age: 69
End: 2024-06-25
Attending: INTERNAL MEDICINE
Payer: MEDICARE

## 2024-06-25 RX ORDER — ATORVASTATIN CALCIUM 40 MG/1
TABLET, FILM COATED ORAL
Qty: 30 TABLET | Refills: 5 | Status: SHIPPED | OUTPATIENT
Start: 2024-06-25

## 2024-07-02 ENCOUNTER — TELEPHONE (OUTPATIENT)
Dept: LAB | Facility: HOSPITAL | Age: 69
End: 2024-07-02

## 2024-07-09 ENCOUNTER — APPOINTMENT (OUTPATIENT)
Dept: LAB | Facility: HOSPITAL | Age: 69
End: 2024-07-09
Payer: MEDICARE

## 2024-07-09 ENCOUNTER — ANTICOAG VISIT (OUTPATIENT)
Dept: CARDIOLOGY CLINIC | Facility: CLINIC | Age: 69
End: 2024-07-09

## 2024-07-18 ENCOUNTER — TELEPHONE (OUTPATIENT)
Dept: LAB | Facility: HOSPITAL | Age: 69
End: 2024-07-18

## 2024-07-23 ENCOUNTER — APPOINTMENT (OUTPATIENT)
Dept: LAB | Facility: HOSPITAL | Age: 69
End: 2024-07-23
Attending: INTERNAL MEDICINE
Payer: MEDICARE

## 2024-07-23 ENCOUNTER — ANTICOAG VISIT (OUTPATIENT)
Dept: CARDIOLOGY CLINIC | Facility: CLINIC | Age: 69
End: 2024-07-23

## 2024-07-30 ENCOUNTER — TELEPHONE (OUTPATIENT)
Dept: LAB | Facility: HOSPITAL | Age: 69
End: 2024-07-30

## 2024-08-06 ENCOUNTER — APPOINTMENT (OUTPATIENT)
Dept: LAB | Facility: HOSPITAL | Age: 69
End: 2024-08-06
Attending: INTERNAL MEDICINE
Payer: MEDICARE

## 2024-08-06 ENCOUNTER — ANTICOAG VISIT (OUTPATIENT)
Dept: CARDIOLOGY CLINIC | Facility: CLINIC | Age: 69
End: 2024-08-06

## 2024-08-12 ENCOUNTER — TELEPHONE (OUTPATIENT)
Dept: LAB | Facility: HOSPITAL | Age: 69
End: 2024-08-12

## 2024-08-20 ENCOUNTER — APPOINTMENT (OUTPATIENT)
Dept: LAB | Facility: HOSPITAL | Age: 69
End: 2024-08-20
Attending: INTERNAL MEDICINE
Payer: MEDICARE

## 2024-08-20 ENCOUNTER — ANTICOAG VISIT (OUTPATIENT)
Dept: CARDIOLOGY CLINIC | Facility: CLINIC | Age: 69
End: 2024-08-20

## 2024-08-23 ENCOUNTER — TELEPHONE (OUTPATIENT)
Dept: FAMILY MEDICINE CLINIC | Facility: CLINIC | Age: 69
End: 2024-08-23

## 2024-08-23 NOTE — TELEPHONE ENCOUNTER
Called and LMOM for patient to call back and schedule awv. Patient is due for Medicare wellness visit

## 2024-08-26 DIAGNOSIS — F33.9 DEPRESSION, RECURRENT (HCC): ICD-10-CM

## 2024-08-26 DIAGNOSIS — I63.40 CEREBROVASCULAR ACCIDENT (CVA) DUE TO EMBOLISM OF CEREBRAL ARTERY (HCC): ICD-10-CM

## 2024-08-26 DIAGNOSIS — I48.91 NEW ONSET A-FIB (HCC): ICD-10-CM

## 2024-08-26 NOTE — TELEPHONE ENCOUNTER
Medication: coumadin    Dose/Frequency: 5 mg    Quantity: 90    Pharmacy:     Office:   [] PCP/Provider -   [x] Speciality/Provider -     Does the patient have enough for 3 days?   [] Yes   [x] No - Send as HP to POD   Medication: atorvastatin     Dose/Frequency: 40 mg qd    Quantity: 90    Pharmacy:     Office:   [] PCP/Provider -   [x] Speciality/Provider -     Does the patient have enough for 3 days?   [] Yes   [x] No - Send as HP to POD     Justine is looking for a 90 days and spoke to phagladys and advised to contact office, pt does have refills on them meidications

## 2024-08-27 ENCOUNTER — TELEPHONE (OUTPATIENT)
Dept: LAB | Facility: HOSPITAL | Age: 69
End: 2024-08-27

## 2024-08-27 DIAGNOSIS — I63.40 CEREBROVASCULAR ACCIDENT (CVA) DUE TO EMBOLISM OF CEREBRAL ARTERY (HCC): ICD-10-CM

## 2024-08-27 DIAGNOSIS — I48.91 NEW ONSET A-FIB (HCC): ICD-10-CM

## 2024-08-27 RX ORDER — WARFARIN SODIUM 5 MG/1
5 TABLET ORAL DAILY
Qty: 30 TABLET | Refills: 1 | Status: CANCELLED | OUTPATIENT
Start: 2024-08-27

## 2024-08-27 RX ORDER — DIGOXIN 125 MCG
125 TABLET ORAL EVERY OTHER DAY
Qty: 45 TABLET | Refills: 1 | Status: CANCELLED | OUTPATIENT
Start: 2024-08-27

## 2024-08-27 RX ORDER — ATORVASTATIN CALCIUM 40 MG/1
40 TABLET, FILM COATED ORAL
Qty: 90 TABLET | Refills: 1 | Status: SHIPPED | OUTPATIENT
Start: 2024-08-27

## 2024-08-27 RX ORDER — WARFARIN SODIUM 5 MG/1
5 TABLET ORAL DAILY
Qty: 30 TABLET | Refills: 1 | Status: SHIPPED | OUTPATIENT
Start: 2024-08-27

## 2024-08-27 RX ORDER — ESCITALOPRAM OXALATE 5 MG/1
5 TABLET ORAL DAILY
Qty: 90 TABLET | Refills: 1 | Status: SHIPPED | OUTPATIENT
Start: 2024-08-27

## 2024-08-27 NOTE — TELEPHONE ENCOUNTER
Patient requesting 90 day supply.  Warfarin removed from previous encounter to send to coumadin clinic.

## 2024-08-27 NOTE — TELEPHONE ENCOUNTER
Refill must be reviewed and completed by the office or provider. The refill is unable to be approved or denied by the medication management team.    Digoxin - Patient requesting 90D supply, Last blood work (digoxin 04.2023) - Please review to see if the refill is appropriate.   Warfarin also requested, removed from this encounter and sending to coumadin clinic

## 2024-09-03 ENCOUNTER — ANTICOAG VISIT (OUTPATIENT)
Dept: CARDIOLOGY CLINIC | Facility: CLINIC | Age: 69
End: 2024-09-03

## 2024-09-03 ENCOUNTER — APPOINTMENT (OUTPATIENT)
Dept: LAB | Facility: HOSPITAL | Age: 69
End: 2024-09-03
Attending: INTERNAL MEDICINE
Payer: MEDICARE

## 2024-09-10 ENCOUNTER — TELEPHONE (OUTPATIENT)
Dept: LAB | Facility: HOSPITAL | Age: 69
End: 2024-09-10

## 2024-09-16 ENCOUNTER — TELEPHONE (OUTPATIENT)
Dept: LAB | Facility: HOSPITAL | Age: 69
End: 2024-09-16

## 2024-09-17 ENCOUNTER — APPOINTMENT (OUTPATIENT)
Dept: LAB | Facility: HOSPITAL | Age: 69
End: 2024-09-17
Payer: MEDICARE

## 2024-09-17 ENCOUNTER — ANTICOAG VISIT (OUTPATIENT)
Dept: CARDIOLOGY CLINIC | Facility: CLINIC | Age: 69
End: 2024-09-17

## 2024-09-25 ENCOUNTER — TELEPHONE (OUTPATIENT)
Dept: LAB | Facility: HOSPITAL | Age: 69
End: 2024-09-25

## 2024-10-01 ENCOUNTER — APPOINTMENT (OUTPATIENT)
Dept: LAB | Facility: HOSPITAL | Age: 69
End: 2024-10-01
Attending: INTERNAL MEDICINE
Payer: MEDICARE

## 2024-10-01 ENCOUNTER — ANTICOAG VISIT (OUTPATIENT)
Dept: CARDIOLOGY CLINIC | Facility: CLINIC | Age: 69
End: 2024-10-01

## 2024-10-01 NOTE — PROGRESS NOTES
PC to patient with good INR of 2.48.  Advised same dosing of 2.5 mgs M/W/F and 5 mgs all other days of the week and retest in 4 weeks.

## 2024-10-22 ENCOUNTER — TELEPHONE (OUTPATIENT)
Dept: LAB | Facility: HOSPITAL | Age: 69
End: 2024-10-22

## 2024-10-29 ENCOUNTER — APPOINTMENT (OUTPATIENT)
Dept: LAB | Facility: HOSPITAL | Age: 69
End: 2024-10-29
Attending: INTERNAL MEDICINE
Payer: MEDICARE

## 2024-10-29 ENCOUNTER — ANTICOAG VISIT (OUTPATIENT)
Dept: CARDIOLOGY CLINIC | Facility: CLINIC | Age: 69
End: 2024-10-29

## 2024-10-29 NOTE — PROGRESS NOTES
PC to patient with good INR of 2.27.  Patient takes 2.5 every M/W/F and 5  mgs all other days of the week.  She is to remain on this dosing and retest in 4 weeks.

## 2024-11-01 DIAGNOSIS — I48.91 NEW ONSET A-FIB (HCC): ICD-10-CM

## 2024-11-01 DIAGNOSIS — I63.40 CEREBROVASCULAR ACCIDENT (CVA) DUE TO EMBOLISM OF CEREBRAL ARTERY (HCC): ICD-10-CM

## 2024-11-01 RX ORDER — WARFARIN SODIUM 5 MG/1
5 TABLET ORAL DAILY
Qty: 90 TABLET | Refills: 1 | Status: SHIPPED | OUTPATIENT
Start: 2024-11-01

## 2024-11-15 DIAGNOSIS — I48.91 NEW ONSET A-FIB (HCC): ICD-10-CM

## 2024-11-19 ENCOUNTER — TELEPHONE (OUTPATIENT)
Dept: LAB | Facility: HOSPITAL | Age: 69
End: 2024-11-19

## 2024-11-21 RX ORDER — DIGOXIN 125 MCG
125 TABLET ORAL EVERY OTHER DAY
Qty: 45 TABLET | Refills: 1 | Status: SHIPPED | OUTPATIENT
Start: 2024-11-21

## 2024-11-26 ENCOUNTER — APPOINTMENT (OUTPATIENT)
Dept: LAB | Facility: HOSPITAL | Age: 69
End: 2024-11-26
Attending: INTERNAL MEDICINE
Payer: MEDICARE

## 2024-11-26 ENCOUNTER — ANTICOAG VISIT (OUTPATIENT)
Dept: CARDIOLOGY CLINIC | Facility: CLINIC | Age: 69
End: 2024-11-26

## 2024-12-06 ENCOUNTER — OFFICE VISIT (OUTPATIENT)
Dept: FAMILY MEDICINE CLINIC | Facility: CLINIC | Age: 69
End: 2024-12-06
Payer: MEDICARE

## 2024-12-06 VITALS
BODY MASS INDEX: 29.63 KG/M2 | HEART RATE: 107 BPM | OXYGEN SATURATION: 97 % | SYSTOLIC BLOOD PRESSURE: 120 MMHG | HEIGHT: 66 IN | WEIGHT: 184.4 LBS | DIASTOLIC BLOOD PRESSURE: 70 MMHG

## 2024-12-06 DIAGNOSIS — E78.00 HYPERCHOLESTEROLEMIA: ICD-10-CM

## 2024-12-06 DIAGNOSIS — K92.1 HEMATOCHEZIA: ICD-10-CM

## 2024-12-06 DIAGNOSIS — G89.29 CHRONIC PAIN OF RIGHT KNEE: Primary | ICD-10-CM

## 2024-12-06 DIAGNOSIS — Z00.00 MEDICARE ANNUAL WELLNESS VISIT, SUBSEQUENT: ICD-10-CM

## 2024-12-06 DIAGNOSIS — F33.9 DEPRESSION, RECURRENT (HCC): ICD-10-CM

## 2024-12-06 DIAGNOSIS — I48.19 PERSISTENT ATRIAL FIBRILLATION (HCC): ICD-10-CM

## 2024-12-06 DIAGNOSIS — M25.561 CHRONIC PAIN OF RIGHT KNEE: Primary | ICD-10-CM

## 2024-12-06 DIAGNOSIS — I63.40 CEREBROVASCULAR ACCIDENT (CVA) DUE TO EMBOLISM OF CEREBRAL ARTERY (HCC): ICD-10-CM

## 2024-12-06 PROCEDURE — G0439 PPPS, SUBSEQ VISIT: HCPCS | Performed by: PHYSICIAN ASSISTANT

## 2024-12-06 PROCEDURE — 99214 OFFICE O/P EST MOD 30 MIN: CPT | Performed by: PHYSICIAN ASSISTANT

## 2024-12-06 NOTE — ASSESSMENT & PLAN NOTE
- Presently stable on atorvastatin and warfarin therapy, no medication changes.      Orders:    CBC and differential    Comprehensive metabolic panel    Lipid Panel with Direct LDL reflex    TSH, 3rd generation with Free T4 reflex

## 2024-12-06 NOTE — PROGRESS NOTES
Name: Justine Nguyen      : 1955      MRN: [id number]  Encounter Provider: Manuel Willett PA-C  Encounter Date: 2024   Encounter department: [facility]    Assessment & Plan  Chronic pain of right knee  Likely started after a fall patient had 3 years ago.  She has had pain since then.  Consider meniscus tear versus arthritic changes.  Recommend x-ray and referral to orthopedic specialist for further evaluation.  She may continue using Tylenol at this time.  Orders:    XR knee 3 vw right non injury; Future    Ambulatory referral to Orthopedic Surgery; Future    Hematochezia  Patient had episode last week which did not continue into this week.  She is not having any pain with bowel movements.  She is recommended to go for screening colonoscopy however declines at this time.  Would recommend seeing specialist especially if she has recurrent symptoms.       Cerebrovascular accident (CVA) due to embolism of cerebral artery (HCC)  - Presently stable on atorvastatin and warfarin therapy, no medication changes.      Orders:    CBC and differential    Comprehensive metabolic panel    Lipid Panel with Direct LDL reflex    TSH, 3rd generation with Free T4 reflex    Persistent atrial fibrillation (HCC)  Stable, patient continues long-term warfarin therapy as well as digoxin for rate control.  Following with Dr. Larson, cardiology.    Orders:    CBC and differential    Comprehensive metabolic panel    Lipid Panel with Direct LDL reflex    TSH, 3rd generation with Free T4 reflex    Depression, recurrent (HCC)  Stable on Lexapro 5 mg daily, no medication changes.         Hypercholesterolemia  Patient is due for reassessment.  In May LDL had gone up to 99.  Would recommend goal LDL of 70 for her.  She was previously in the 70s with current statin therapy.  Continue healthy diet and exercise.    Orders:    CBC and differential    Comprehensive metabolic panel    Lipid Panel with Direct LDL  reflex    TSH, 3rd generation with Free T4 reflex    Medicare annual wellness visit, subsequent            Preventive health issues were discussed with patient, and age appropriate screening tests were ordered as noted in patient's After Visit Summary. Personalized health advice and appropriate referrals for health education or preventive services given if needed, as noted in patient's After Visit Summary.    History of Present Illness     HPI: This is a 69-year-old female who presents to the office for follow-up of chronic health conditions including history of CVA with persistent atrial fibrillation and depression.  She is also seen for annual Medicare wellness visit today.  She complains that she has been having chronic pain in the right knee for about 3 years.  This was since she had passed out and fell backwards and her knee was hyperflexed.  Since then she seems to get intermittent swelling in the knee as well as some popping and grinding sensations.  She has been using Tylenol in the evenings which does seem to help but she was concerned about continuing to use this since she is on warfarin therapy.  She does know that she cannot take ibuprofen products.  She does have A-fib and continues to follow with Dr. Larson.  She has been stable on digoxin every other day and warfarin.  She is getting regular home drawls of her blood test.  She does also note that last week after Thanksgiving she had an episode of blood in the toilet bowl.  She did not have any rectal pain with bowel movement and did not feel as though she was straining harder than normal.  She had 1 or 2 more episodes that she noticed a little bit on the toilet paper but since then has not had any recurrent episodes.  She has not continued to stay up-to-date with colon cancer screening.  She has had colonoscopy however it was over a decade ago.  She has also had a history of smoking and quit almost 2 years ago.    Knee Pain   Pertinent negatives  include no numbness.      Patient Care Team:  Manuel Willett PA-C as PCP - General (Family Medicine)  Dave Larson MD (Cardiology)  Leonardo Loredo MD (Neurology)    Review of Systems   Constitutional:  Negative for chills, fatigue and fever.   HENT:  Negative for congestion, ear pain and sinus pressure.    Eyes:  Negative for visual disturbance.   Respiratory:  Negative for cough, chest tightness and shortness of breath.    Cardiovascular:  Negative for chest pain and palpitations.   Gastrointestinal:  Positive for blood in stool. Negative for diarrhea, nausea and vomiting.   Endocrine: Negative for polyuria.   Genitourinary:  Negative for dysuria and frequency.   Musculoskeletal:  Positive for arthralgias. Negative for myalgias.   Skin:  Negative for pallor and rash.   Neurological:  Negative for dizziness, weakness, light-headedness, numbness and headaches.   Psychiatric/Behavioral:  Negative for agitation, behavioral problems and sleep disturbance.    All other systems reviewed and are negative.    Medical History Reviewed by provider this encounter:       Annual Wellness Visit Questionnaire   Justine is here for her Subsequent Wellness visit.     Health Risk Assessment:   Patient rates overall health as fair. Patient feels that their physical health rating is same. Patient is satisfied with their life. Eyesight was rated as slightly worse. Hearing was rated as same. Patient feels that their emotional and mental health rating is same. Patients states they are often angry. Patient states they are sometimes unusually tired/fatigued. Pain experienced in the last 7 days has been some. Patient's pain rating has been 7/10. Patient states that she has experienced no weight loss or gain in last 6 months.     Depression Screening:   PHQ-9 Score: 4      Fall Risk Screening:   In the past year, patient has experienced: no history of falling in past year      Urinary Incontinence Screening:   Patient has leaked urine  accidently in the last six months.     Home Safety:  Patient has trouble with stairs inside or outside of their home. Patient has working smoke alarms and has no working carbon monoxide detector. Home safety hazards include: none.     Nutrition:   Current diet is Regular.     Medications:   Patient is currently taking over-the-counter supplements. OTC medications include: see medication list. Patient is able to manage medications.     Activities of Daily Living (ADLs)/Instrumental Activities of Daily Living (IADLs):   Walk and transfer into and out of bed and chair?: Yes  Dress and groom yourself?: Yes    Bathe or shower yourself?: Yes    Feed yourself? Yes  Do your laundry/housekeeping?: Yes  Manage your money, pay your bills and track your expenses?: Yes  Make your own meals?: Yes    Do your own shopping?: Yes    Previous Hospitalizations:   Any hospitalizations or ED visits within the last 12 months?: No      Advance Care Planning:   Living will: No      Cognitive Screening:   Provider or family/friend/caregiver concerned regarding cognition?: No    PREVENTIVE SCREENINGS      Cardiovascular Screening:    General: Screening Not Indicated and History Lipid Disorder      Diabetes Screening:     General: Screening Current      Colorectal Cancer Screening:     General: Risks and Benefits Discussed and Patient Declines    Due for: Colonoscopy - High Risk      Breast Cancer Screening:     General: Risks and Benefits Discussed and Patient Declines      Cervical Cancer Screening:    General: Screening Not Indicated      Osteoporosis Screening:    General: Risks and Benefits Discussed and Patient Declines      Abdominal Aortic Aneurysm (AAA) Screening:        General: Screening Not Indicated      Lung Cancer Screening:     General: Risks and Benefits Discussed and Patient Declines      Hepatitis C Screening:    General: Screening Not Indicated      Preventive Screening Comments: Patient declines breast cancer screening,  "lung cancer screening, and colon cancer screening.    Screening, Brief Intervention, and Referral to Treatment (SBIRT)    Screening  Typical number of drinks in a day: 0  Typical number of drinks in a week: 0  Interpretation: Low risk drinking behavior.    Single Item Drug Screening:  How often have you used an illegal drug (including marijuana) or a prescription medication for non-medical reasons in the past year? never    Single Item Drug Screen Score: 0  Interpretation: Negative screen for possible drug use disorder    Social Drivers of Health     Financial Resource Strain: Medium Risk (6/6/2023)    Overall Financial Resource Strain (CARDIA)     Difficulty of Paying Living Expenses: Somewhat hard   Food Insecurity: No Food Insecurity (12/6/2024)    Hunger Vital Sign     Worried About Running Out of Food in the Last Year: Never true     Ran Out of Food in the Last Year: Never true   Transportation Needs: No Transportation Needs (12/6/2024)    PRAPARE - Transportation     Lack of Transportation (Medical): No     Lack of Transportation (Non-Medical): No   Housing Stability: Low Risk  (12/6/2024)    Housing Stability Vital Sign     Unable to Pay for Housing in the Last Year: No     Number of Times Moved in the Last Year: 0     Homeless in the Last Year: No   Utilities: Not At Risk (12/6/2024)    [facility] Utilities     Threatened with loss of utilities: No     No results found.    Objective   /70 (BP Location: Right arm, Patient Position: Sitting, Cuff Size: Standard)   Pulse (!) 107   Ht 5' 6\" (1.676 m)   Wt 83.6 kg (184 lb 6.4 oz)   SpO2 97%   BMI 29.76 kg/m²     Physical Exam  Constitutional:       General: She is not in acute distress.     Appearance: Normal appearance.   HENT:      Head: Normocephalic and atraumatic.      Right Ear: Tympanic membrane normal.      Left Ear: Tympanic membrane normal.      Nose: No congestion or rhinorrhea.   Eyes:      Conjunctiva/sclera: Conjunctivae normal.      Pupils: " Pupils are equal, round, and reactive to light.   Neck:      Vascular: No carotid bruit.   Cardiovascular:      Rate and Rhythm: Normal rate and regular rhythm.      Heart sounds: No murmur heard.  Pulmonary:      Effort: Pulmonary effort is normal. No respiratory distress.      Breath sounds: Normal breath sounds.   Abdominal:      Palpations: Abdomen is soft.   Musculoskeletal:         General: Normal range of motion.      Cervical back: Normal range of motion and neck supple. No muscular tenderness.   Lymphadenopathy:      Cervical: No cervical adenopathy.   Skin:     General: Skin is warm.      Capillary Refill: Capillary refill takes less than 2 seconds.   Neurological:      General: No focal deficit present.      Mental Status: She is alert and oriented to person, place, and time.   Psychiatric:         Mood and Affect: Mood normal.

## 2024-12-06 NOTE — ASSESSMENT & PLAN NOTE
Patient is due for reassessment.  In May LDL had gone up to 99.  Would recommend goal LDL of 70 for her.  She was previously in the 70s with current statin therapy.  Continue healthy diet and exercise.    Orders:    CBC and differential    Comprehensive metabolic panel    Lipid Panel with Direct LDL reflex    TSH, 3rd generation with Free T4 reflex

## 2024-12-06 NOTE — ASSESSMENT & PLAN NOTE
Stable, patient continues long-term warfarin therapy as well as digoxin for rate control.  Following with Dr. Larson, cardiology.    Orders:    CBC and differential    Comprehensive metabolic panel    Lipid Panel with Direct LDL reflex    TSH, 3rd generation with Free T4 reflex

## 2024-12-10 ENCOUNTER — APPOINTMENT (OUTPATIENT)
Dept: LAB | Facility: HOSPITAL | Age: 69
End: 2024-12-10
Attending: INTERNAL MEDICINE
Payer: MEDICARE

## 2024-12-10 LAB
BASOPHILS # BLD AUTO: 0.1 THOUSANDS/ÂΜL (ref 0–0.1)
BASOPHILS NFR BLD AUTO: 1 % (ref 0–1)
EOSINOPHIL # BLD AUTO: 0.17 THOUSAND/ÂΜL (ref 0–0.61)
EOSINOPHIL NFR BLD AUTO: 2 % (ref 0–6)
ERYTHROCYTE [DISTWIDTH] IN BLOOD BY AUTOMATED COUNT: 14 % (ref 11.6–15.1)
HCT VFR BLD AUTO: 45.5 % (ref 34.8–46.1)
HGB BLD-MCNC: 14.5 G/DL (ref 11.5–15.4)
IMM GRANULOCYTES # BLD AUTO: 0.03 THOUSAND/UL (ref 0–0.2)
IMM GRANULOCYTES NFR BLD AUTO: 0 % (ref 0–2)
LYMPHOCYTES # BLD AUTO: 2.1 THOUSANDS/ÂΜL (ref 0.6–4.47)
LYMPHOCYTES NFR BLD AUTO: 25 % (ref 14–44)
MCH RBC QN AUTO: 28.9 PG (ref 26.8–34.3)
MCHC RBC AUTO-ENTMCNC: 31.9 G/DL (ref 31.4–37.4)
MCV RBC AUTO: 91 FL (ref 82–98)
MONOCYTES # BLD AUTO: 0.59 THOUSAND/ÂΜL (ref 0.17–1.22)
MONOCYTES NFR BLD AUTO: 7 % (ref 4–12)
NEUTROPHILS # BLD AUTO: 5.34 THOUSANDS/ÂΜL (ref 1.85–7.62)
NEUTS SEG NFR BLD AUTO: 65 % (ref 43–75)
NRBC BLD AUTO-RTO: 0 /100 WBCS
PLATELET # BLD AUTO: 349 THOUSANDS/UL (ref 149–390)
PMV BLD AUTO: 10.7 FL (ref 8.9–12.7)
RBC # BLD AUTO: 5.01 MILLION/UL (ref 3.81–5.12)
TSH SERPL DL<=0.05 MIU/L-ACNC: 2.29 UIU/ML (ref 0.45–4.5)
WBC # BLD AUTO: 8.33 THOUSAND/UL (ref 4.31–10.16)

## 2024-12-10 PROCEDURE — 85025 COMPLETE CBC W/AUTO DIFF WBC: CPT | Performed by: PHYSICIAN ASSISTANT

## 2024-12-10 PROCEDURE — 80053 COMPREHEN METABOLIC PANEL: CPT | Performed by: PHYSICIAN ASSISTANT

## 2024-12-10 PROCEDURE — 84443 ASSAY THYROID STIM HORMONE: CPT | Performed by: PHYSICIAN ASSISTANT

## 2024-12-10 PROCEDURE — 80061 LIPID PANEL: CPT | Performed by: PHYSICIAN ASSISTANT

## 2024-12-11 ENCOUNTER — RESULTS FOLLOW-UP (OUTPATIENT)
Dept: FAMILY MEDICINE CLINIC | Facility: CLINIC | Age: 69
End: 2024-12-11

## 2024-12-11 ENCOUNTER — ANTICOAG VISIT (OUTPATIENT)
Dept: CARDIOLOGY CLINIC | Facility: CLINIC | Age: 69
End: 2024-12-11

## 2024-12-11 LAB
ALBUMIN SERPL BCG-MCNC: 4.4 G/DL (ref 3.5–5)
ALP SERPL-CCNC: 97 U/L (ref 34–104)
ALT SERPL W P-5'-P-CCNC: 18 U/L (ref 7–52)
ANION GAP SERPL CALCULATED.3IONS-SCNC: 8 MMOL/L (ref 4–13)
AST SERPL W P-5'-P-CCNC: 22 U/L (ref 13–39)
BILIRUB SERPL-MCNC: 0.57 MG/DL (ref 0.2–1)
BUN SERPL-MCNC: 9 MG/DL (ref 5–25)
CALCIUM SERPL-MCNC: 9.2 MG/DL (ref 8.4–10.2)
CHLORIDE SERPL-SCNC: 102 MMOL/L (ref 96–108)
CHOLEST SERPL-MCNC: 182 MG/DL (ref ?–200)
CO2 SERPL-SCNC: 28 MMOL/L (ref 21–32)
CREAT SERPL-MCNC: 0.56 MG/DL (ref 0.6–1.3)
GFR SERPL CREATININE-BSD FRML MDRD: 96 ML/MIN/1.73SQ M
GLUCOSE P FAST SERPL-MCNC: 88 MG/DL (ref 65–99)
HDLC SERPL-MCNC: 58 MG/DL
LDLC SERPL CALC-MCNC: 99 MG/DL (ref 0–100)
POTASSIUM SERPL-SCNC: 4.8 MMOL/L (ref 3.5–5.3)
PROT SERPL-MCNC: 7.6 G/DL (ref 6.4–8.4)
SODIUM SERPL-SCNC: 138 MMOL/L (ref 135–147)
TRIGL SERPL-MCNC: 126 MG/DL (ref ?–150)

## 2024-12-17 ENCOUNTER — TELEPHONE (OUTPATIENT)
Dept: LAB | Facility: HOSPITAL | Age: 69
End: 2024-12-17

## 2024-12-24 ENCOUNTER — ANTICOAG VISIT (OUTPATIENT)
Dept: CARDIOLOGY CLINIC | Facility: CLINIC | Age: 69
End: 2024-12-24

## 2024-12-24 ENCOUNTER — APPOINTMENT (OUTPATIENT)
Dept: LAB | Facility: HOSPITAL | Age: 69
End: 2024-12-24
Payer: MEDICARE

## 2024-12-24 DIAGNOSIS — I48.19 PERSISTENT ATRIAL FIBRILLATION (HCC): Primary | ICD-10-CM

## 2024-12-24 LAB
INR PPP: 2.54 (ref 0.85–1.19)
PROTHROMBIN TIME: 27.2 SECONDS (ref 12.3–15)

## 2024-12-24 PROCEDURE — 36415 COLL VENOUS BLD VENIPUNCTURE: CPT

## 2024-12-24 PROCEDURE — 85610 PROTHROMBIN TIME: CPT

## 2024-12-26 DIAGNOSIS — I63.40 CEREBROVASCULAR ACCIDENT (CVA) DUE TO EMBOLISM OF CEREBRAL ARTERY (HCC): ICD-10-CM

## 2024-12-26 DIAGNOSIS — F33.9 DEPRESSION, RECURRENT (HCC): ICD-10-CM

## 2024-12-27 RX ORDER — ATORVASTATIN CALCIUM 40 MG/1
40 TABLET, FILM COATED ORAL
Qty: 90 TABLET | Refills: 1 | Status: SHIPPED | OUTPATIENT
Start: 2024-12-27

## 2024-12-27 RX ORDER — ESCITALOPRAM OXALATE 5 MG/1
5 TABLET ORAL DAILY
Qty: 90 TABLET | Refills: 1 | Status: SHIPPED | OUTPATIENT
Start: 2024-12-27

## 2024-12-30 DIAGNOSIS — I48.91 ATRIAL FIBRILLATION, UNSPECIFIED TYPE (HCC): Primary | ICD-10-CM

## 2024-12-30 DIAGNOSIS — I48.19 PERSISTENT ATRIAL FIBRILLATION (HCC): ICD-10-CM

## 2024-12-30 DIAGNOSIS — I63.40 CEREBROVASCULAR ACCIDENT (CVA) DUE TO EMBOLISM OF CEREBRAL ARTERY (HCC): ICD-10-CM

## 2025-01-07 ENCOUNTER — ANTICOAG VISIT (OUTPATIENT)
Dept: CARDIOLOGY CLINIC | Facility: CLINIC | Age: 70
End: 2025-01-07

## 2025-01-07 ENCOUNTER — APPOINTMENT (OUTPATIENT)
Dept: LAB | Facility: HOSPITAL | Age: 70
End: 2025-01-07
Payer: MEDICARE

## 2025-01-07 DIAGNOSIS — I48.19 PERSISTENT ATRIAL FIBRILLATION (HCC): ICD-10-CM

## 2025-01-07 DIAGNOSIS — I63.40 CEREBROVASCULAR ACCIDENT (CVA) DUE TO EMBOLISM OF CEREBRAL ARTERY (HCC): ICD-10-CM

## 2025-01-07 LAB
INR PPP: 2.67 (ref 0.85–1.19)
PROTHROMBIN TIME: 28.3 SECONDS (ref 12.3–15)

## 2025-01-07 PROCEDURE — 85610 PROTHROMBIN TIME: CPT

## 2025-01-07 PROCEDURE — 36415 COLL VENOUS BLD VENIPUNCTURE: CPT

## 2025-01-15 ENCOUNTER — TELEPHONE (OUTPATIENT)
Dept: LAB | Facility: HOSPITAL | Age: 70
End: 2025-01-15

## 2025-01-20 ENCOUNTER — VBI (OUTPATIENT)
Dept: ADMINISTRATIVE | Facility: OTHER | Age: 70
End: 2025-01-20

## 2025-01-20 NOTE — TELEPHONE ENCOUNTER
01/20/25 11:26 AM     Chart reviewed for   PREV-13: Statin Therapy for the Prevention and Treatment of Cardiovascular Disease    was/were submitted to the patient's insurance.     Yary Olvera   [facility]

## 2025-01-21 ENCOUNTER — APPOINTMENT (OUTPATIENT)
Dept: LAB | Facility: HOSPITAL | Age: 70
End: 2025-01-21
Attending: INTERNAL MEDICINE
Payer: MEDICARE

## 2025-01-21 DIAGNOSIS — I48.19 PERSISTENT ATRIAL FIBRILLATION (HCC): ICD-10-CM

## 2025-01-21 DIAGNOSIS — I63.40 CEREBROVASCULAR ACCIDENT (CVA) DUE TO EMBOLISM OF CEREBRAL ARTERY (HCC): ICD-10-CM

## 2025-01-21 LAB
INR PPP: 2.92 (ref 0.85–1.19)
PROTHROMBIN TIME: 30.2 SECONDS (ref 12.3–15)

## 2025-01-21 PROCEDURE — 36415 COLL VENOUS BLD VENIPUNCTURE: CPT

## 2025-01-21 PROCEDURE — 85610 PROTHROMBIN TIME: CPT

## 2025-01-22 ENCOUNTER — ANTICOAG VISIT (OUTPATIENT)
Dept: CARDIOLOGY CLINIC | Facility: CLINIC | Age: 70
End: 2025-01-22

## 2025-01-27 ENCOUNTER — VBI (OUTPATIENT)
Dept: ADMINISTRATIVE | Facility: OTHER | Age: 70
End: 2025-01-27

## 2025-01-27 ENCOUNTER — TELEPHONE (OUTPATIENT)
Dept: LAB | Facility: HOSPITAL | Age: 70
End: 2025-01-27

## 2025-01-27 NOTE — TELEPHONE ENCOUNTER
01/27/25 2:09 PM     Chart reviewed for Mammogram ; nothing is submitted to the patient's insurance at this time.     Marquita Lei   [facility]

## 2025-01-28 ENCOUNTER — APPOINTMENT (OUTPATIENT)
Dept: RADIOLOGY | Facility: CLINIC | Age: 70
End: 2025-01-28
Payer: MEDICARE

## 2025-01-28 DIAGNOSIS — G89.29 CHRONIC PAIN OF RIGHT KNEE: ICD-10-CM

## 2025-01-28 DIAGNOSIS — M25.561 CHRONIC PAIN OF RIGHT KNEE: ICD-10-CM

## 2025-01-28 PROCEDURE — 73562 X-RAY EXAM OF KNEE 3: CPT

## 2025-02-04 ENCOUNTER — ANTICOAG VISIT (OUTPATIENT)
Dept: CARDIOLOGY CLINIC | Facility: CLINIC | Age: 70
End: 2025-02-04

## 2025-02-04 ENCOUNTER — APPOINTMENT (OUTPATIENT)
Dept: LAB | Facility: HOSPITAL | Age: 70
End: 2025-02-04
Attending: INTERNAL MEDICINE
Payer: MEDICARE

## 2025-02-04 DIAGNOSIS — I48.19 PERSISTENT ATRIAL FIBRILLATION (HCC): ICD-10-CM

## 2025-02-04 DIAGNOSIS — I63.40 CEREBROVASCULAR ACCIDENT (CVA) DUE TO EMBOLISM OF CEREBRAL ARTERY (HCC): ICD-10-CM

## 2025-02-04 LAB
INR PPP: 2.2 (ref 0.85–1.19)
PROTHROMBIN TIME: 24.4 SECONDS (ref 12.3–15)

## 2025-02-04 PROCEDURE — 85610 PROTHROMBIN TIME: CPT

## 2025-02-04 PROCEDURE — 36415 COLL VENOUS BLD VENIPUNCTURE: CPT

## 2025-02-10 ENCOUNTER — VBI (OUTPATIENT)
Dept: ADMINISTRATIVE | Facility: OTHER | Age: 70
End: 2025-02-10

## 2025-02-10 NOTE — TELEPHONE ENCOUNTER
02/10/25 11:26 AM     Chart reviewed for CRC: Colonoscopy was/were not submitted to the patient's insurance.     Mitra Anthony MA   [facility]

## 2025-02-11 ENCOUNTER — TELEPHONE (OUTPATIENT)
Dept: LAB | Facility: HOSPITAL | Age: 70
End: 2025-02-11

## 2025-02-14 ENCOUNTER — VBI (OUTPATIENT)
Dept: ADMINISTRATIVE | Facility: OTHER | Age: 70
End: 2025-02-14

## 2025-02-14 NOTE — TELEPHONE ENCOUNTER
02/14/25 2:11 PM     Chart reviewed for Mammogram was/were not submitted to the patient's insurance.     Estefani Perea MA   [facility]

## 2025-02-18 ENCOUNTER — APPOINTMENT (OUTPATIENT)
Dept: LAB | Facility: HOSPITAL | Age: 70
End: 2025-02-18
Attending: INTERNAL MEDICINE
Payer: MEDICARE

## 2025-02-18 ENCOUNTER — ANTICOAG VISIT (OUTPATIENT)
Dept: CARDIOLOGY CLINIC | Facility: CLINIC | Age: 70
End: 2025-02-18

## 2025-02-18 DIAGNOSIS — I63.40 CEREBROVASCULAR ACCIDENT (CVA) DUE TO EMBOLISM OF CEREBRAL ARTERY (HCC): ICD-10-CM

## 2025-02-18 DIAGNOSIS — I48.19 PERSISTENT ATRIAL FIBRILLATION (HCC): ICD-10-CM

## 2025-02-18 LAB
INR PPP: 2.44 (ref 0.85–1.19)
PROTHROMBIN TIME: 26.4 SECONDS (ref 12.3–15)

## 2025-02-18 PROCEDURE — 85610 PROTHROMBIN TIME: CPT

## 2025-02-18 PROCEDURE — 36415 COLL VENOUS BLD VENIPUNCTURE: CPT

## 2025-02-19 ENCOUNTER — VBI (OUTPATIENT)
Dept: ADMINISTRATIVE | Facility: OTHER | Age: 70
End: 2025-02-19

## 2025-02-19 NOTE — TELEPHONE ENCOUNTER
02/19/25 1:36 PM     Chart reviewed for PREV-13: Statin Therapy for the Prevention and Treatment of Cardiovascular Disease was/were submitted to the patient's insurance.     Suyapa Montoya MA   [facility]

## 2025-02-25 ENCOUNTER — TELEPHONE (OUTPATIENT)
Dept: LAB | Facility: HOSPITAL | Age: 70
End: 2025-02-25

## 2025-03-04 ENCOUNTER — APPOINTMENT (OUTPATIENT)
Dept: LAB | Facility: HOSPITAL | Age: 70
End: 2025-03-04
Attending: INTERNAL MEDICINE
Payer: MEDICARE

## 2025-03-04 DIAGNOSIS — I63.40 CEREBROVASCULAR ACCIDENT (CVA) DUE TO EMBOLISM OF CEREBRAL ARTERY (HCC): ICD-10-CM

## 2025-03-04 DIAGNOSIS — I48.19 PERSISTENT ATRIAL FIBRILLATION (HCC): ICD-10-CM

## 2025-03-04 LAB
INR PPP: 2.47 (ref 0.85–1.19)
PROTHROMBIN TIME: 26.7 SECONDS (ref 12.3–15)

## 2025-03-04 PROCEDURE — 85610 PROTHROMBIN TIME: CPT

## 2025-03-04 PROCEDURE — 36415 COLL VENOUS BLD VENIPUNCTURE: CPT

## 2025-03-05 ENCOUNTER — ANTICOAG VISIT (OUTPATIENT)
Dept: CARDIOLOGY CLINIC | Facility: CLINIC | Age: 70
End: 2025-03-05

## 2025-03-10 ENCOUNTER — TELEPHONE (OUTPATIENT)
Dept: LAB | Facility: HOSPITAL | Age: 70
End: 2025-03-10

## 2025-03-18 ENCOUNTER — ANTICOAG VISIT (OUTPATIENT)
Dept: CARDIOLOGY CLINIC | Facility: CLINIC | Age: 70
End: 2025-03-18

## 2025-03-18 ENCOUNTER — APPOINTMENT (OUTPATIENT)
Dept: LAB | Facility: HOSPITAL | Age: 70
End: 2025-03-18
Payer: MEDICARE

## 2025-03-18 DIAGNOSIS — I48.19 PERSISTENT ATRIAL FIBRILLATION (HCC): ICD-10-CM

## 2025-03-18 DIAGNOSIS — I63.40 CEREBROVASCULAR ACCIDENT (CVA) DUE TO EMBOLISM OF CEREBRAL ARTERY (HCC): ICD-10-CM

## 2025-03-18 LAB
INR PPP: 2.75 (ref 0.85–1.19)
PROTHROMBIN TIME: 28.9 SECONDS (ref 12.3–15)

## 2025-03-18 PROCEDURE — 36415 COLL VENOUS BLD VENIPUNCTURE: CPT

## 2025-03-18 PROCEDURE — 85610 PROTHROMBIN TIME: CPT

## 2025-03-21 ENCOUNTER — APPOINTMENT (OUTPATIENT)
Dept: RADIOLOGY | Facility: MEDICAL CENTER | Age: 70
End: 2025-03-21
Payer: MEDICARE

## 2025-03-21 ENCOUNTER — OFFICE VISIT (OUTPATIENT)
Dept: OBGYN CLINIC | Facility: MEDICAL CENTER | Age: 70
End: 2025-03-21
Payer: MEDICARE

## 2025-03-21 ENCOUNTER — TELEPHONE (OUTPATIENT)
Dept: OBGYN CLINIC | Facility: MEDICAL CENTER | Age: 70
End: 2025-03-21

## 2025-03-21 VITALS — WEIGHT: 193.6 LBS | HEIGHT: 66 IN | BODY MASS INDEX: 31.12 KG/M2

## 2025-03-21 DIAGNOSIS — M17.11 PRIMARY OSTEOARTHRITIS OF RIGHT KNEE: Primary | ICD-10-CM

## 2025-03-21 DIAGNOSIS — M17.11 PRIMARY OSTEOARTHRITIS OF RIGHT KNEE: ICD-10-CM

## 2025-03-21 PROCEDURE — 73560 X-RAY EXAM OF KNEE 1 OR 2: CPT

## 2025-03-21 PROCEDURE — 99204 OFFICE O/P NEW MOD 45 MIN: CPT | Performed by: ORTHOPAEDIC SURGERY

## 2025-03-21 PROCEDURE — 20610 DRAIN/INJ JOINT/BURSA W/O US: CPT | Performed by: ORTHOPAEDIC SURGERY

## 2025-03-21 RX ORDER — TRIAMCINOLONE ACETONIDE 40 MG/ML
40 INJECTION, SUSPENSION INTRA-ARTICULAR; INTRAMUSCULAR
Status: COMPLETED | OUTPATIENT
Start: 2025-03-21 | End: 2025-03-21

## 2025-03-21 RX ORDER — BUPIVACAINE HYDROCHLORIDE 2.5 MG/ML
2 INJECTION, SOLUTION INFILTRATION; PERINEURAL
Status: COMPLETED | OUTPATIENT
Start: 2025-03-21 | End: 2025-03-21

## 2025-03-21 RX ADMIN — TRIAMCINOLONE ACETONIDE 40 MG: 40 INJECTION, SUSPENSION INTRA-ARTICULAR; INTRAMUSCULAR at 09:45

## 2025-03-21 RX ADMIN — BUPIVACAINE HYDROCHLORIDE 2 ML: 2.5 INJECTION, SOLUTION INFILTRATION; PERINEURAL at 09:45

## 2025-03-21 NOTE — PATIENT INSTRUCTIONS
Can take Tylenol 1,000mg by mouth every 8 hours as needed for pain.  Do not exceed 3,000mg per day.

## 2025-03-21 NOTE — PROGRESS NOTES
Name: Justine Nguyen      : 1955      MRN: [id number]  Encounter Provider: Lilibeth Can DO  Encounter Date: 3/21/2025   Encounter department: [facility]  :  Assessment & Plan  Primary osteoarthritis of right knee  Patient has severe right knee osteoarthritis.   Treatment options were discussed with patient at today's visit.    Patient is a surgical candidate at this time but would like to avoid surgery at this time.   Injections: Patient received right knee steroid injection today. Tolerated the procedure well. Post injection instructions reviewed including information on glucose monitoring for diabetic patients. Patient aware that they may repeat steroid injection every 3 months if needed.   Medications: Can take Tylenol 1,000mg by mouth every 8 hours as needed for pain.  Do not exceed 3,000mg per day. No NSAIDs due to being on warfarin.  PT: [facility] referral was given to patient today.  Activity: Continue activity as tolerated.   Plan for next appt: right knee CSI  Orders:    XR knee 1 or 2 vw right; Future        Return in about 3 months (around 2025).    I answered all of the patient's questions during the visit and provided education of the patient's condition during the visit.  The patient verbalized understanding of the information given and agrees with the plan.  This note was dictated using "[employer]" software.  It may contain errors including improperly dictated words.  Please contact physician directly for any questions.    History of Present Illness   HPI  Chief complaint:   Chief Complaint   Patient presents with    Right Knee - Pain       HPI: Justine Nguyen is a 70 y.o. female that c/o right knee pain.    Length of time knee pain has been present: 5 years  Any falls or trauma associated with onset of pain: Daughter states that she did have an episode where she passed out 5 years ago fell onto her knee.  Location of pain:  generalized  Intermittent or constant: intermittent  Description of pain: achy and sharp  Aggravating factors: sitting to standing steps and walking   Instability or locking: no  Pain medication that has been tried: tylenol   Topical mediation that has been tried: no  Has heat/ice/elevation been tried: no  Can NSAIDs be taken?  If not why?: no due to being on warfarin   Has PT or home exercises been tried?: no  Has bracing been tried? OTC or rx?  no  Have injections been tried?  Steroid/visco?: no  Any history of surgery on that knee?:  no  Miscellaneous: Patient has a history of a stroke 2 years ago.  Currently is on warfarin.      ROS:    See HPI for musculoskeletal review.   All other systems reviewed are negative     Historical Information   Past Medical History:   Diagnosis Date    Migraine     Premature supraventricular beats      No past surgical history on file.  Social History   Social History     Substance and Sexual Activity   Alcohol Use No    Comment: (HISTORY)     Social History     Substance and Sexual Activity   Drug Use No     Social History     Tobacco Use   Smoking Status Former    Current packs/day: 0.00    Average packs/day: 1 pack/day for 40.0 years (40.0 ttl pk-yrs)    Types: Cigarettes    Start date: 3/1/1983    Quit date: 3/1/2023    Years since quittin.0    Passive exposure: Past   Smokeless Tobacco Never     Family History:   Family History   Problem Relation Age of Onset    Colon cancer Father     Colon cancer Brother        Current Outpatient Medications on File Prior to Visit   Medication Sig Dispense Refill    atorvastatin (LIPITOR) 40 mg tablet take 1 tablet by mouth daily with dinner 90 tablet 1    digoxin (LANOXIN) 0.125 mg tablet Take 1 tablet (125 mcg total) by mouth every other day 45 tablet 1    escitalopram (LEXAPRO) 5 mg tablet take 1 tablet by mouth once daily 90 tablet 1    warfarin (COUMADIN) 5 mg tablet take 1 tablet by mouth once daily 90 tablet 1     No current  "facility-administered medications on file prior to visit.     No Known Allergies    Current Outpatient Medications on File Prior to Visit   Medication Sig Dispense Refill    atorvastatin (LIPITOR) 40 mg tablet take 1 tablet by mouth daily with dinner 90 tablet 1    digoxin (LANOXIN) 0.125 mg tablet Take 1 tablet (125 mcg total) by mouth every other day 45 tablet 1    escitalopram (LEXAPRO) 5 mg tablet take 1 tablet by mouth once daily 90 tablet 1    warfarin (COUMADIN) 5 mg tablet take 1 tablet by mouth once daily 90 tablet 1     No current facility-administered medications on file prior to visit.         Objective   Ht 5' 6\" (1.676 m)   Wt 87.8 kg (193 lb 9.6 oz)   BMI 31.25 kg/m²        PE:  AAOx 3  WDWN  Hearing intact, no drainage from eyes  Regular rate  no audible wheezing  no abdominal distension  LE compartments soft, skin intact    Ortho Exam:  rightknee:    Appearance:  No  swelling   No  ecchymosis  No  obvious joint deformity   No  effusion   Palpation/Tenderness:  Yes  TTP over medial joint line  Yes  TTP over lateral joint line   Yes  TTP over patella  No  TTP over patellar tendon  mild  TTP over pes anserine bursa  Active Range of Motion:  AROM: 0-120  Special Tests:  Valgus Stress Test: negative  Varus Stress Test: negative    No ipsilateral hip pain with ROM    rightLE:    Sensation grossly intact  Palpable 2+ pulse  AT/GS/EHL intact    Imaging Studies: Results Review Statement: I personally reviewed the following image studies in PACS and associated radiology reports: xray(s). My interpretation of the radiology images/reports is: Severe right knee osteoarthritis with bone-on-bone contact.    Large joint arthrocentesis: R knee  [place] Protocol:  procedure performed by consultantConsent: Verbal consent obtained.  Risks and benefits: risks, benefits and alternatives were discussed  Consent given by: patient  Patient understanding: patient states understanding of the procedure being " performed  Patient identity confirmed: verbally with patient  Supporting Documentation  Indications: pain and diagnostic evaluation   Procedure Details  Location: knee - R knee  Preparation: Patient was prepped and draped in the usual sterile fashion  Needle size: 22 G  Ultrasound guidance: no  Approach: anterolateral  Medications administered: 2 mL bupivacaine 0.25 %; 40 mg triamcinolone acetonide 40 mg/mL             Scribe Attestation      I,:  Jason Quigley am acting as a scribe while in the presence of the attending physician.:       I,:  Lilibeth Can DO personally performed the services described in this documentation    as scribed in my presence.:

## 2025-03-21 NOTE — TELEPHONE ENCOUNTER
[facility] order has been completed and faxed. They will run patient's benefits and call patient to arrange sessions.

## 2025-04-01 ENCOUNTER — APPOINTMENT (OUTPATIENT)
Dept: LAB | Facility: HOSPITAL | Age: 70
End: 2025-04-01
Payer: MEDICARE

## 2025-04-01 ENCOUNTER — ANTICOAG VISIT (OUTPATIENT)
Dept: CARDIOLOGY CLINIC | Facility: CLINIC | Age: 70
End: 2025-04-01

## 2025-04-01 DIAGNOSIS — I48.19 PERSISTENT ATRIAL FIBRILLATION (HCC): ICD-10-CM

## 2025-04-01 DIAGNOSIS — I63.40 CEREBROVASCULAR ACCIDENT (CVA) DUE TO EMBOLISM OF CEREBRAL ARTERY (HCC): ICD-10-CM

## 2025-04-01 LAB
INR PPP: 2.17 (ref 0.85–1.19)
PROTHROMBIN TIME: 24.2 SECONDS (ref 12.3–15)

## 2025-04-01 PROCEDURE — 36415 COLL VENOUS BLD VENIPUNCTURE: CPT

## 2025-04-01 PROCEDURE — 85610 PROTHROMBIN TIME: CPT

## 2025-04-04 ENCOUNTER — TELEPHONE (OUTPATIENT)
Dept: LAB | Facility: HOSPITAL | Age: 70
End: 2025-04-04

## 2025-04-15 ENCOUNTER — ANTICOAG VISIT (OUTPATIENT)
Dept: CARDIOLOGY CLINIC | Facility: CLINIC | Age: 70
End: 2025-04-15

## 2025-04-15 ENCOUNTER — APPOINTMENT (OUTPATIENT)
Dept: LAB | Facility: HOSPITAL | Age: 70
End: 2025-04-15
Attending: INTERNAL MEDICINE
Payer: MEDICARE

## 2025-04-15 DIAGNOSIS — I48.19 PERSISTENT ATRIAL FIBRILLATION (HCC): ICD-10-CM

## 2025-04-15 DIAGNOSIS — I63.40 CEREBROVASCULAR ACCIDENT (CVA) DUE TO EMBOLISM OF CEREBRAL ARTERY (HCC): ICD-10-CM

## 2025-04-15 LAB
INR PPP: 2.34 (ref 0.85–1.19)
PROTHROMBIN TIME: 25.5 SECONDS (ref 12.3–15)

## 2025-04-15 PROCEDURE — 36415 COLL VENOUS BLD VENIPUNCTURE: CPT

## 2025-04-15 PROCEDURE — 85610 PROTHROMBIN TIME: CPT

## 2025-04-29 ENCOUNTER — APPOINTMENT (OUTPATIENT)
Dept: LAB | Facility: HOSPITAL | Age: 70
End: 2025-04-29
Attending: INTERNAL MEDICINE
Payer: MEDICARE

## 2025-04-29 ENCOUNTER — ANTICOAG VISIT (OUTPATIENT)
Dept: CARDIOLOGY CLINIC | Facility: CLINIC | Age: 70
End: 2025-04-29

## 2025-04-29 DIAGNOSIS — I63.40 CEREBROVASCULAR ACCIDENT (CVA) DUE TO EMBOLISM OF CEREBRAL ARTERY (HCC): ICD-10-CM

## 2025-04-29 DIAGNOSIS — I48.19 PERSISTENT ATRIAL FIBRILLATION (HCC): ICD-10-CM

## 2025-04-29 LAB
INR PPP: 2.67 (ref 0.85–1.19)
PROTHROMBIN TIME: 28.3 SECONDS (ref 12.3–15)

## 2025-04-29 PROCEDURE — 85610 PROTHROMBIN TIME: CPT

## 2025-04-29 PROCEDURE — 36415 COLL VENOUS BLD VENIPUNCTURE: CPT

## 2025-04-29 NOTE — PROGRESS NOTES
Left message for Sharmaine, advised INR good, will continue 2.5 mg Mon Wed Fri, 5 mg all other days, will recheck in 2 weeks 5/13/25  Advised to call with questions or concerns.

## 2025-05-13 ENCOUNTER — ANTICOAG VISIT (OUTPATIENT)
Dept: CARDIOLOGY CLINIC | Facility: CLINIC | Age: 70
End: 2025-05-13

## 2025-05-13 ENCOUNTER — APPOINTMENT (OUTPATIENT)
Dept: LAB | Facility: HOSPITAL | Age: 70
End: 2025-05-13
Attending: INTERNAL MEDICINE
Payer: MEDICARE

## 2025-05-13 DIAGNOSIS — I48.19 PERSISTENT ATRIAL FIBRILLATION (HCC): ICD-10-CM

## 2025-05-13 DIAGNOSIS — I63.40 CEREBROVASCULAR ACCIDENT (CVA) DUE TO EMBOLISM OF CEREBRAL ARTERY (HCC): ICD-10-CM

## 2025-05-13 LAB
INR PPP: 2.31 (ref 0.85–1.19)
PROTHROMBIN TIME: 25.4 SECONDS (ref 12.3–15)

## 2025-05-13 PROCEDURE — 85610 PROTHROMBIN TIME: CPT

## 2025-05-13 PROCEDURE — 36415 COLL VENOUS BLD VENIPUNCTURE: CPT

## 2025-05-17 DIAGNOSIS — I63.40 CEREBROVASCULAR ACCIDENT (CVA) DUE TO EMBOLISM OF CEREBRAL ARTERY (HCC): ICD-10-CM

## 2025-05-17 DIAGNOSIS — I48.91 NEW ONSET A-FIB (HCC): ICD-10-CM

## 2025-05-18 DIAGNOSIS — I48.91 NEW ONSET A-FIB (HCC): ICD-10-CM

## 2025-05-19 ENCOUNTER — TELEPHONE (OUTPATIENT)
Dept: LAB | Facility: HOSPITAL | Age: 70
End: 2025-05-19

## 2025-05-19 RX ORDER — DIGOXIN 125 MCG
125 TABLET ORAL EVERY OTHER DAY
Qty: 45 TABLET | Refills: 1 | Status: SHIPPED | OUTPATIENT
Start: 2025-05-19

## 2025-05-19 RX ORDER — WARFARIN SODIUM 5 MG/1
5 TABLET ORAL DAILY
Qty: 90 TABLET | Refills: 1 | Status: SHIPPED | OUTPATIENT
Start: 2025-05-19

## 2025-05-27 ENCOUNTER — ANTICOAG VISIT (OUTPATIENT)
Dept: CARDIOLOGY CLINIC | Facility: CLINIC | Age: 70
End: 2025-05-27

## 2025-05-27 ENCOUNTER — APPOINTMENT (OUTPATIENT)
Dept: LAB | Facility: HOSPITAL | Age: 70
End: 2025-05-27
Attending: INTERNAL MEDICINE
Payer: MEDICARE

## 2025-05-27 DIAGNOSIS — I63.40 CEREBROVASCULAR ACCIDENT (CVA) DUE TO EMBOLISM OF CEREBRAL ARTERY (HCC): ICD-10-CM

## 2025-05-27 DIAGNOSIS — I48.19 PERSISTENT ATRIAL FIBRILLATION (HCC): ICD-10-CM

## 2025-05-27 LAB
INR PPP: 2.57 (ref 0.85–1.19)
PROTHROMBIN TIME: 27.5 SECONDS (ref 12.3–15)

## 2025-05-27 PROCEDURE — 85610 PROTHROMBIN TIME: CPT

## 2025-05-27 PROCEDURE — 36415 COLL VENOUS BLD VENIPUNCTURE: CPT

## 2025-06-03 ENCOUNTER — TELEPHONE (OUTPATIENT)
Dept: LAB | Facility: HOSPITAL | Age: 70
End: 2025-06-03

## 2025-06-10 ENCOUNTER — ANTICOAG VISIT (OUTPATIENT)
Dept: CARDIOLOGY CLINIC | Facility: CLINIC | Age: 70
End: 2025-06-10

## 2025-06-10 ENCOUNTER — APPOINTMENT (OUTPATIENT)
Dept: LAB | Facility: HOSPITAL | Age: 70
End: 2025-06-10
Payer: MEDICARE

## 2025-06-10 DIAGNOSIS — I63.40 CEREBROVASCULAR ACCIDENT (CVA) DUE TO EMBOLISM OF CEREBRAL ARTERY (HCC): ICD-10-CM

## 2025-06-10 DIAGNOSIS — I48.19 PERSISTENT ATRIAL FIBRILLATION (HCC): ICD-10-CM

## 2025-06-10 LAB
INR PPP: 2.56 (ref 0.85–1.19)
PROTHROMBIN TIME: 27.4 SECONDS (ref 12.3–15)

## 2025-06-10 PROCEDURE — 85610 PROTHROMBIN TIME: CPT

## 2025-06-10 PROCEDURE — 36415 COLL VENOUS BLD VENIPUNCTURE: CPT

## 2025-06-16 ENCOUNTER — TELEPHONE (OUTPATIENT)
Dept: LAB | Facility: HOSPITAL | Age: 70
End: 2025-06-16

## 2025-06-20 ENCOUNTER — OFFICE VISIT (OUTPATIENT)
Dept: OBGYN CLINIC | Facility: MEDICAL CENTER | Age: 70
End: 2025-06-20
Payer: MEDICARE

## 2025-06-20 ENCOUNTER — APPOINTMENT (OUTPATIENT)
Dept: RADIOLOGY | Facility: MEDICAL CENTER | Age: 70
End: 2025-06-20
Attending: ORTHOPAEDIC SURGERY
Payer: MEDICARE

## 2025-06-20 VITALS — HEIGHT: 65 IN | BODY MASS INDEX: 31.86 KG/M2 | WEIGHT: 191.2 LBS

## 2025-06-20 DIAGNOSIS — M25.562 LEFT KNEE PAIN, UNSPECIFIED CHRONICITY: ICD-10-CM

## 2025-06-20 DIAGNOSIS — M17.12 PRIMARY OSTEOARTHRITIS OF LEFT KNEE: Primary | ICD-10-CM

## 2025-06-20 DIAGNOSIS — M17.11 PRIMARY OSTEOARTHRITIS OF RIGHT KNEE: ICD-10-CM

## 2025-06-20 PROCEDURE — 20610 DRAIN/INJ JOINT/BURSA W/O US: CPT | Performed by: PHYSICIAN ASSISTANT

## 2025-06-20 PROCEDURE — 73564 X-RAY EXAM KNEE 4 OR MORE: CPT

## 2025-06-20 PROCEDURE — 99214 OFFICE O/P EST MOD 30 MIN: CPT | Performed by: ORTHOPAEDIC SURGERY

## 2025-06-20 RX ORDER — BUPIVACAINE HYDROCHLORIDE 2.5 MG/ML
2 INJECTION, SOLUTION INFILTRATION; PERINEURAL
Status: COMPLETED | OUTPATIENT
Start: 2025-06-20 | End: 2025-06-20

## 2025-06-20 RX ORDER — TRIAMCINOLONE ACETONIDE 40 MG/ML
40 INJECTION, SUSPENSION INTRA-ARTICULAR; INTRAMUSCULAR
Status: COMPLETED | OUTPATIENT
Start: 2025-06-20 | End: 2025-06-20

## 2025-06-20 RX ADMIN — TRIAMCINOLONE ACETONIDE 40 MG: 40 INJECTION, SUSPENSION INTRA-ARTICULAR; INTRAMUSCULAR at 10:30

## 2025-06-20 RX ADMIN — BUPIVACAINE HYDROCHLORIDE 2 ML: 2.5 INJECTION, SOLUTION INFILTRATION; PERINEURAL at 10:30

## 2025-06-20 NOTE — PROGRESS NOTES
Name: Justine Nguyen      : 1955      MRN: [id number]  Encounter Provider: Lilibeth Can DO  Encounter Date: 2025   Encounter department: [facility]  :  Assessment & Plan  Left knee pain, unspecified chronicity    Orders:    XR knee 4+ vw left injury; Future    Primary osteoarthritis of left knee  Patient has severe right and left valgus knee osteoarthritis.   Treatment options were discussed with patient at today's visit.    Patient is a surgical candidate at this time but would like to avoid surgery at this time.   Injections: Patient received left knee steroid injection today only.  Patient last had the right knee injected 3 months ago and it is still working.  Tolerated the procedure well. Post injection instructions reviewed including information on glucose monitoring for diabetic patients. Patient aware that they may repeat steroid injection every 3 months if needed.   Medications: Can take Tylenol 1,000mg by mouth every 8 hours as needed for pain.  Do not exceed 3,000mg per day. No NSAIDs due to being on warfarin.  PT: [facility] referral given last office visit  Activity: Continue activity as tolerated.   Plan for next appt: right knee CSI       Primary osteoarthritis of right knee             No follow-ups on file.    I answered all of the patient's questions during the visit and provided education of the patient's condition during the visit.  The patient verbalized understanding of the information given and agrees with the plan.  This note was dictated using [employer] software.  It may contain errors including improperly dictated words.  Please contact physician directly for any questions.    History of Present Illness   HPI  Chief complaint:   No chief complaint on file.      HPI: Justine Nguyen is a 70 y.o. female that complain of bilateral knee pain.  Last visit in March x-rays of just the right knee were obtained which did show  severe valgus OA.  She received cortisone injection which provided 90% plus relief.  She never formally has had the left knee looked at.  length of time knee pain has been present: 5 years  Any falls or trauma associated with onset of pain: Daughter states that she did have an episode where she passed out 5 years ago fell onto her knee.  Location of pain: generalized  Intermittent or constant: intermittent  Description of pain: achy and sharp  Aggravating factors: sitting to standing steps and walking   Instability or locking: no  Pain medication that has been tried: tylenol   Topical mediation that has been tried: no  Has heat/ice/elevation been tried: no  Can NSAIDs be taken?  If not why?: no due to being on warfarin   Has PT or home exercises been tried?:  [facility]  Has bracing been tried? OTC or rx?  no  Have injections been tried?  Steroid/visco?:  1 injection right knee only 3- with 90% plus relief  Any history of surgery on that knee?:  no  Miscellaneous: Patient has a history of a stroke 2 years ago.  Currently is on warfarin.      ROS:    See HPI for musculoskeletal review.   All other systems reviewed are negative     Historical Information   Past Medical History:   Diagnosis Date    Migraine     Premature supraventricular beats      No past surgical history on file.  Social History   Social History     Substance and Sexual Activity   Alcohol Use No    Comment: (HISTORY)     Social History     Substance and Sexual Activity   Drug Use No     Social History     Tobacco Use   Smoking Status Former    Current packs/day: 0.00    Average packs/day: 1 pack/day for 40.0 years (40.0 ttl pk-yrs)    Types: Cigarettes    Start date: 3/1/1983    Quit date: 3/1/2023    Years since quittin.3    Passive exposure: Past   Smokeless Tobacco Never     Family History:   Family History   Problem Relation Name Age of Onset    Colon cancer Father      Colon cancer Brother         Current Outpatient Medications on  "File Prior to Visit   Medication Sig Dispense Refill    atorvastatin (LIPITOR) 40 mg tablet take 1 tablet by mouth daily with dinner 90 tablet 1    digoxin (LANOXIN) 0.125 mg tablet take 1 tablet by mouth every other day 45 tablet 1    escitalopram (LEXAPRO) 5 mg tablet take 1 tablet by mouth once daily 90 tablet 1    warfarin (COUMADIN) 5 mg tablet take 1 tablet by mouth once daily 90 tablet 1     No current facility-administered medications on file prior to visit.     No Known Allergies    Current Outpatient Medications on File Prior to Visit   Medication Sig Dispense Refill    atorvastatin (LIPITOR) 40 mg tablet take 1 tablet by mouth daily with dinner 90 tablet 1    digoxin (LANOXIN) 0.125 mg tablet take 1 tablet by mouth every other day 45 tablet 1    escitalopram (LEXAPRO) 5 mg tablet take 1 tablet by mouth once daily 90 tablet 1    warfarin (COUMADIN) 5 mg tablet take 1 tablet by mouth once daily 90 tablet 1     No current facility-administered medications on file prior to visit.         Objective   Ht 5' 5.35\" (1.66 m)   Wt 86.7 kg (191 lb 3.2 oz)   BMI 31.47 kg/m²        PE:  AAOx 3  WDWN  Hearing intact, no drainage from eyes  Regular rate  no audible wheezing  no abdominal distension  LE compartments soft, skin intact    Ortho Exam:  Left:    Appearance:  No  swelling   No  ecchymosis  No  obvious joint deformity   No  effusion   Palpation/Tenderness:  Yes  TTP over medial joint line  Yes  TTP over lateral joint line   Yes  TTP over patella  No  TTP over patellar tendon  mild  TTP over pes anserine bursa  Active Range of Motion:  AROM: 0-120 bilaterally  Special Tests:  Valgus Stress Test: negative  Varus Stress Test: negative    No ipsilateral hip pain with ROM    Left kneE:    Sensation grossly intact  Palpable 2+ pulse  AT/GS/EHL intact    Imaging Studies: Results Review Statement: I personally reviewed the following image studies in PACS and associated radiology reports: xray(s). My interpretation " of the radiology images/reports is: Severe left valgus knee osteoarthritis with bone-on-bone contact.    Large joint arthrocentesis: L knee    Performed by: Ruddy Todd PA-C  Authorized by: Ruddy Todd PA-C    [place] Protocol:  procedure performed by consultantConsent: Verbal consent obtained  Risks and benefits: risks, benefits and alternatives were discussed  Consent given by: patient  Patient understanding: patient states understanding of the procedure being performed  Patient identity confirmed: verbally with patient  Supporting Documentation  Indications: pain and diagnostic evaluation     Is this a Visco injection? NoProcedure Details  Location: knee - L knee  Preparation: Patient was prepped and draped in the usual sterile fashion  Needle size: 22 G  Ultrasound guidance: no  Approach: anterolateral  Medications administered: 2 mL bupivacaine 0.25 %; 40 mg triamcinolone acetonide 40 mg/mL             Scribe Attestation      I,:   am acting as a scribe while in the presence of the attending physician.:       I,:   personally performed the services described in this documentation    as scribed in my presence.:

## 2025-06-24 ENCOUNTER — APPOINTMENT (OUTPATIENT)
Dept: LAB | Facility: HOSPITAL | Age: 70
End: 2025-06-24
Attending: INTERNAL MEDICINE
Payer: MEDICARE

## 2025-06-24 DIAGNOSIS — I48.19 PERSISTENT ATRIAL FIBRILLATION (HCC): ICD-10-CM

## 2025-06-24 DIAGNOSIS — I63.40 CEREBROVASCULAR ACCIDENT (CVA) DUE TO EMBOLISM OF CEREBRAL ARTERY (HCC): ICD-10-CM

## 2025-06-24 LAB
INR PPP: 2.65 (ref 0.85–1.19)
PROTHROMBIN TIME: 27.9 SECONDS (ref 12.3–15)

## 2025-06-24 PROCEDURE — 36415 COLL VENOUS BLD VENIPUNCTURE: CPT

## 2025-06-24 PROCEDURE — 85610 PROTHROMBIN TIME: CPT

## 2025-06-25 ENCOUNTER — ANTICOAG VISIT (OUTPATIENT)
Dept: CARDIOLOGY CLINIC | Facility: CLINIC | Age: 70
End: 2025-06-25

## 2025-07-02 ENCOUNTER — TELEPHONE (OUTPATIENT)
Dept: LAB | Facility: HOSPITAL | Age: 70
End: 2025-07-02

## 2025-07-08 ENCOUNTER — APPOINTMENT (OUTPATIENT)
Dept: LAB | Facility: HOSPITAL | Age: 70
End: 2025-07-08
Payer: MEDICARE

## 2025-07-08 ENCOUNTER — ANTICOAG VISIT (OUTPATIENT)
Dept: CARDIOLOGY CLINIC | Facility: CLINIC | Age: 70
End: 2025-07-08

## 2025-07-08 DIAGNOSIS — I63.40 CEREBROVASCULAR ACCIDENT (CVA) DUE TO EMBOLISM OF CEREBRAL ARTERY (HCC): ICD-10-CM

## 2025-07-08 DIAGNOSIS — I48.19 PERSISTENT ATRIAL FIBRILLATION (HCC): ICD-10-CM

## 2025-07-08 LAB
INR PPP: 2.49 (ref 0.85–1.19)
PROTHROMBIN TIME: 26.8 SECONDS (ref 12.3–15)

## 2025-07-08 PROCEDURE — 36415 COLL VENOUS BLD VENIPUNCTURE: CPT

## 2025-07-08 PROCEDURE — 85610 PROTHROMBIN TIME: CPT

## 2025-07-15 ENCOUNTER — TELEPHONE (OUTPATIENT)
Dept: LAB | Facility: HOSPITAL | Age: 70
End: 2025-07-15

## 2025-07-22 ENCOUNTER — APPOINTMENT (OUTPATIENT)
Dept: LAB | Facility: HOSPITAL | Age: 70
End: 2025-07-22
Attending: INTERNAL MEDICINE
Payer: MEDICARE

## 2025-07-22 DIAGNOSIS — I48.19 PERSISTENT ATRIAL FIBRILLATION (HCC): ICD-10-CM

## 2025-07-22 DIAGNOSIS — I63.40 CEREBROVASCULAR ACCIDENT (CVA) DUE TO EMBOLISM OF CEREBRAL ARTERY (HCC): ICD-10-CM

## 2025-07-22 LAB
INR PPP: 2.54 (ref 0.85–1.19)
PROTHROMBIN TIME: 27.8 SECONDS (ref 12.3–15)

## 2025-07-22 PROCEDURE — 36415 COLL VENOUS BLD VENIPUNCTURE: CPT

## 2025-07-22 PROCEDURE — 85610 PROTHROMBIN TIME: CPT

## 2025-07-23 ENCOUNTER — ANTICOAG VISIT (OUTPATIENT)
Dept: CARDIOLOGY CLINIC | Facility: CLINIC | Age: 70
End: 2025-07-23

## 2025-07-28 ENCOUNTER — TELEPHONE (OUTPATIENT)
Dept: LAB | Facility: HOSPITAL | Age: 70
End: 2025-07-28

## 2025-08-04 ENCOUNTER — TELEPHONE (OUTPATIENT)
Dept: LAB | Facility: HOSPITAL | Age: 70
End: 2025-08-04

## 2025-08-05 ENCOUNTER — ANTICOAG VISIT (OUTPATIENT)
Dept: CARDIOLOGY CLINIC | Facility: CLINIC | Age: 70
End: 2025-08-05

## 2025-08-05 ENCOUNTER — APPOINTMENT (OUTPATIENT)
Dept: LAB | Facility: HOSPITAL | Age: 70
End: 2025-08-05
Payer: MEDICARE

## 2025-08-05 DIAGNOSIS — I63.40 CEREBROVASCULAR ACCIDENT (CVA) DUE TO EMBOLISM OF CEREBRAL ARTERY (HCC): ICD-10-CM

## 2025-08-05 DIAGNOSIS — I48.19 PERSISTENT ATRIAL FIBRILLATION (HCC): ICD-10-CM

## 2025-08-05 LAB
INR PPP: 2.7 (ref 0.85–1.19)
PROTHROMBIN TIME: 28.5 SECONDS (ref 12.3–15)

## 2025-08-05 PROCEDURE — 85610 PROTHROMBIN TIME: CPT

## 2025-08-05 PROCEDURE — 36415 COLL VENOUS BLD VENIPUNCTURE: CPT

## 2025-08-20 ENCOUNTER — TELEPHONE (OUTPATIENT)
Dept: LAB | Facility: HOSPITAL | Age: 70
End: 2025-08-20

## 2025-08-20 DIAGNOSIS — I63.40 CEREBROVASCULAR ACCIDENT (CVA) DUE TO EMBOLISM OF CEREBRAL ARTERY (HCC): ICD-10-CM

## 2025-08-20 DIAGNOSIS — F33.9 DEPRESSION, RECURRENT (HCC): ICD-10-CM

## 2025-08-21 RX ORDER — ESCITALOPRAM OXALATE 5 MG/1
5 TABLET ORAL DAILY
Qty: 90 TABLET | Refills: 1 | Status: SHIPPED | OUTPATIENT
Start: 2025-08-21

## 2025-08-22 RX ORDER — ATORVASTATIN CALCIUM 40 MG/1
40 TABLET, FILM COATED ORAL
Qty: 90 TABLET | Refills: 1 | Status: SHIPPED | OUTPATIENT
Start: 2025-08-22